# Patient Record
Sex: MALE | Race: WHITE | Employment: OTHER | URBAN - METROPOLITAN AREA
[De-identification: names, ages, dates, MRNs, and addresses within clinical notes are randomized per-mention and may not be internally consistent; named-entity substitution may affect disease eponyms.]

---

## 2017-02-08 RX ORDER — ATENOLOL 50 MG/1
TABLET ORAL
Qty: 90 TABLET | Refills: 0 | Status: SHIPPED | OUTPATIENT
Start: 2017-02-08 | End: 2018-09-04

## 2018-09-04 ENCOUNTER — HOSPITAL ENCOUNTER (OUTPATIENT)
Age: 73
Setting detail: OBSERVATION
Discharge: HOME | End: 2018-09-06
Attending: EMERGENCY MEDICINE | Admitting: INTERNAL MEDICINE
Payer: MEDICARE

## 2018-09-04 ENCOUNTER — APPOINTMENT (OUTPATIENT)
Dept: CT IMAGING | Age: 73
End: 2018-09-04
Payer: MEDICARE

## 2018-09-04 ENCOUNTER — APPOINTMENT (OUTPATIENT)
Dept: GENERAL RADIOLOGY | Age: 73
End: 2018-09-04
Payer: MEDICARE

## 2018-09-04 DIAGNOSIS — I25.10 CORONARY ARTERY DISEASE INVOLVING NATIVE CORONARY ARTERY OF NATIVE HEART, ANGINA PRESENCE UNSPECIFIED: ICD-10-CM

## 2018-09-04 DIAGNOSIS — R07.9 CHEST PAIN ON EXERTION: Primary | ICD-10-CM

## 2018-09-04 DIAGNOSIS — R06.09 DYSPNEA ON EXERTION: ICD-10-CM

## 2018-09-04 LAB
A/G RATIO: 1.6 (ref 1.1–2.2)
ALBUMIN SERPL-MCNC: 4 G/DL (ref 3.4–5)
ALP BLD-CCNC: 57 U/L (ref 40–129)
ALT SERPL-CCNC: 20 U/L (ref 10–40)
ANION GAP SERPL CALCULATED.3IONS-SCNC: 12 MMOL/L (ref 3–16)
AST SERPL-CCNC: 21 U/L (ref 15–37)
BASOPHILS ABSOLUTE: 0.1 K/UL (ref 0–0.2)
BASOPHILS RELATIVE PERCENT: 0.9 %
BILIRUB SERPL-MCNC: 0.5 MG/DL (ref 0–1)
BUN BLDV-MCNC: 18 MG/DL (ref 7–20)
CALCIUM SERPL-MCNC: 9.2 MG/DL (ref 8.3–10.6)
CHLORIDE BLD-SCNC: 105 MMOL/L (ref 99–110)
CO2: 24 MMOL/L (ref 21–32)
CREAT SERPL-MCNC: 1 MG/DL (ref 0.8–1.3)
EOSINOPHILS ABSOLUTE: 0.1 K/UL (ref 0–0.6)
EOSINOPHILS RELATIVE PERCENT: 2.3 %
GFR AFRICAN AMERICAN: >60
GFR NON-AFRICAN AMERICAN: >60
GLOBULIN: 2.5 G/DL
GLUCOSE BLD-MCNC: 109 MG/DL (ref 70–99)
HCT VFR BLD CALC: 38 % (ref 40.5–52.5)
HEMOGLOBIN: 13.4 G/DL (ref 13.5–17.5)
LYMPHOCYTES ABSOLUTE: 0.9 K/UL (ref 1–5.1)
LYMPHOCYTES RELATIVE PERCENT: 14.3 %
MCH RBC QN AUTO: 31.6 PG (ref 26–34)
MCHC RBC AUTO-ENTMCNC: 35.3 G/DL (ref 31–36)
MCV RBC AUTO: 89.7 FL (ref 80–100)
MONOCYTES ABSOLUTE: 0.6 K/UL (ref 0–1.3)
MONOCYTES RELATIVE PERCENT: 9.6 %
NEUTROPHILS ABSOLUTE: 4.6 K/UL (ref 1.7–7.7)
NEUTROPHILS RELATIVE PERCENT: 72.9 %
PDW BLD-RTO: 13.6 % (ref 12.4–15.4)
PLATELET # BLD: 202 K/UL (ref 135–450)
PMV BLD AUTO: 7.4 FL (ref 5–10.5)
POTASSIUM REFLEX MAGNESIUM: 4.6 MMOL/L (ref 3.5–5.1)
PRO-BNP: 142 PG/ML (ref 0–124)
RBC # BLD: 4.24 M/UL (ref 4.2–5.9)
SODIUM BLD-SCNC: 141 MMOL/L (ref 136–145)
TOTAL PROTEIN: 6.5 G/DL (ref 6.4–8.2)
TROPONIN: <0.01 NG/ML
WBC # BLD: 6.3 K/UL (ref 4–11)

## 2018-09-04 PROCEDURE — 6360000004 HC RX CONTRAST MEDICATION: Performed by: NURSE PRACTITIONER

## 2018-09-04 PROCEDURE — G0378 HOSPITAL OBSERVATION PER HR: HCPCS

## 2018-09-04 PROCEDURE — 6370000000 HC RX 637 (ALT 250 FOR IP): Performed by: INTERNAL MEDICINE

## 2018-09-04 PROCEDURE — 6370000000 HC RX 637 (ALT 250 FOR IP): Performed by: NURSE PRACTITIONER

## 2018-09-04 PROCEDURE — 83880 ASSAY OF NATRIURETIC PEPTIDE: CPT

## 2018-09-04 PROCEDURE — 85025 COMPLETE CBC W/AUTO DIFF WBC: CPT

## 2018-09-04 PROCEDURE — 71275 CT ANGIOGRAPHY CHEST: CPT

## 2018-09-04 PROCEDURE — 2580000003 HC RX 258: Performed by: NURSE PRACTITIONER

## 2018-09-04 PROCEDURE — 93010 ELECTROCARDIOGRAM REPORT: CPT | Performed by: INTERNAL MEDICINE

## 2018-09-04 PROCEDURE — 80053 COMPREHEN METABOLIC PANEL: CPT

## 2018-09-04 PROCEDURE — 36415 COLL VENOUS BLD VENIPUNCTURE: CPT

## 2018-09-04 PROCEDURE — 84484 ASSAY OF TROPONIN QUANT: CPT

## 2018-09-04 PROCEDURE — 6370000000 HC RX 637 (ALT 250 FOR IP): Performed by: EMERGENCY MEDICINE

## 2018-09-04 PROCEDURE — 71045 X-RAY EXAM CHEST 1 VIEW: CPT

## 2018-09-04 PROCEDURE — 99285 EMERGENCY DEPT VISIT HI MDM: CPT

## 2018-09-04 PROCEDURE — 93005 ELECTROCARDIOGRAM TRACING: CPT | Performed by: EMERGENCY MEDICINE

## 2018-09-04 RX ORDER — MORPHINE SULFATE 4 MG/ML
4 INJECTION, SOLUTION INTRAMUSCULAR; INTRAVENOUS
Status: DISCONTINUED | OUTPATIENT
Start: 2018-09-04 | End: 2018-09-06 | Stop reason: HOSPADM

## 2018-09-04 RX ORDER — ASPIRIN 81 MG/1
324 TABLET, CHEWABLE ORAL ONCE
Status: COMPLETED | OUTPATIENT
Start: 2018-09-04 | End: 2018-09-04

## 2018-09-04 RX ORDER — NITROGLYCERIN 0.4 MG/1
0.4 TABLET SUBLINGUAL EVERY 5 MIN PRN
Status: DISCONTINUED | OUTPATIENT
Start: 2018-09-04 | End: 2018-09-06 | Stop reason: HOSPADM

## 2018-09-04 RX ORDER — CHLORAL HYDRATE 500 MG
2000 CAPSULE ORAL 2 TIMES DAILY
Status: DISCONTINUED | OUTPATIENT
Start: 2018-09-04 | End: 2018-09-04 | Stop reason: RX

## 2018-09-04 RX ORDER — METHYLPHENIDATE HYDROCHLORIDE 10 MG/1
10 TABLET ORAL 4 TIMES DAILY
Status: DISCONTINUED | OUTPATIENT
Start: 2018-09-04 | End: 2018-09-06 | Stop reason: HOSPADM

## 2018-09-04 RX ORDER — ATENOLOL 50 MG/1
1 TABLET ORAL DAILY
Status: CANCELLED | OUTPATIENT
Start: 2018-09-04

## 2018-09-04 RX ORDER — TRAZODONE HYDROCHLORIDE 50 MG/1
100 TABLET ORAL NIGHTLY
Status: DISCONTINUED | OUTPATIENT
Start: 2018-09-04 | End: 2018-09-06 | Stop reason: HOSPADM

## 2018-09-04 RX ORDER — FLUOXETINE HYDROCHLORIDE 20 MG/1
60 CAPSULE ORAL DAILY
Status: DISCONTINUED | OUTPATIENT
Start: 2018-09-04 | End: 2018-09-06 | Stop reason: HOSPADM

## 2018-09-04 RX ORDER — ASPIRIN 81 MG/1
81 TABLET, CHEWABLE ORAL DAILY
Status: DISCONTINUED | OUTPATIENT
Start: 2018-09-04 | End: 2018-09-06 | Stop reason: HOSPADM

## 2018-09-04 RX ORDER — SODIUM CHLORIDE 0.9 % (FLUSH) 0.9 %
10 SYRINGE (ML) INJECTION EVERY 12 HOURS SCHEDULED
Status: DISCONTINUED | OUTPATIENT
Start: 2018-09-04 | End: 2018-09-06 | Stop reason: HOSPADM

## 2018-09-04 RX ORDER — M-VIT,TX,IRON,MINS/CALC/FOLIC 27MG-0.4MG
1 TABLET ORAL DAILY
Status: DISCONTINUED | OUTPATIENT
Start: 2018-09-04 | End: 2018-09-06 | Stop reason: HOSPADM

## 2018-09-04 RX ORDER — SODIUM CHLORIDE 0.9 % (FLUSH) 0.9 %
10 SYRINGE (ML) INJECTION PRN
Status: DISCONTINUED | OUTPATIENT
Start: 2018-09-04 | End: 2018-09-06 | Stop reason: HOSPADM

## 2018-09-04 RX ORDER — ACETAMINOPHEN 325 MG/1
650 TABLET ORAL EVERY 4 HOURS PRN
Status: DISCONTINUED | OUTPATIENT
Start: 2018-09-04 | End: 2018-09-06 | Stop reason: HOSPADM

## 2018-09-04 RX ORDER — ONDANSETRON 2 MG/ML
4 INJECTION INTRAMUSCULAR; INTRAVENOUS EVERY 6 HOURS PRN
Status: DISCONTINUED | OUTPATIENT
Start: 2018-09-04 | End: 2018-09-06 | Stop reason: HOSPADM

## 2018-09-04 RX ORDER — ASCORBIC ACID 500 MG
500 TABLET ORAL DAILY
Status: DISCONTINUED | OUTPATIENT
Start: 2018-09-04 | End: 2018-09-06 | Stop reason: HOSPADM

## 2018-09-04 RX ORDER — ATORVASTATIN CALCIUM 40 MG/1
40 TABLET, FILM COATED ORAL NIGHTLY
Status: DISCONTINUED | OUTPATIENT
Start: 2018-09-04 | End: 2018-09-06 | Stop reason: HOSPADM

## 2018-09-04 RX ADMIN — ASPIRIN 81 MG 324 MG: 81 TABLET ORAL at 12:09

## 2018-09-04 RX ADMIN — FLUOXETINE 60 MG: 20 CAPSULE ORAL at 21:16

## 2018-09-04 RX ADMIN — ASPIRIN 81 MG 81 MG: 81 TABLET ORAL at 21:16

## 2018-09-04 RX ADMIN — TRAZODONE HYDROCHLORIDE 100 MG: 50 TABLET ORAL at 21:21

## 2018-09-04 RX ADMIN — ATORVASTATIN CALCIUM 40 MG: 40 TABLET, FILM COATED ORAL at 21:21

## 2018-09-04 RX ADMIN — SODIUM CHLORIDE, PRESERVATIVE FREE 10 ML: 5 INJECTION INTRAVENOUS at 21:21

## 2018-09-04 RX ADMIN — IOPAMIDOL 75 ML: 755 INJECTION, SOLUTION INTRAVENOUS at 15:18

## 2018-09-04 ASSESSMENT — PAIN SCALES - GENERAL
PAINLEVEL_OUTOF10: 0

## 2018-09-04 ASSESSMENT — HEART SCORE: ECG: 0

## 2018-09-04 NOTE — H&P
daily.      Historical Provider, MD   Psyllium (METAMUCIL PO) Take  by mouth. Historical Provider, MD   Omega-3 Fatty Acids (FISH OIL) 1000 MG CAPS Take 2,000 mg by mouth 2 times daily. Historical Provider, MD   Misc Natural Products (OSTEO BI-FLEX JOINT SHIELD PO) Take  by mouth. Historical Provider, MD       Allergies:  Pcn [penicillins] and Sulfa antibiotics    Social History:      The patient currently lives at home with his wife. TOBACCO:   reports that he has never smoked. He has never used smokeless tobacco.  ETOH:   reports that he does not drink alcohol. Family History:    Positive as follows:    Family History   Problem Relation Age of Onset    Heart Disease Maternal Grandfather        REVIEW OF SYSTEMS:   Pertinent positives as noted in the HPI. All other systems reviewed and negative. PHYSICAL EXAM PERFORMED:    /77   Pulse 52   Temp 98.5 °F (36.9 °C) (Oral)   Resp 16   Ht 5' 10\" (1.778 m)   Wt 220 lb (99.8 kg)   SpO2 98%   BMI 31.57 kg/m²     General appearance:  No apparent distress, appears stated age and cooperative. HEENT:  Normal cephalic, atraumatic without obvious deformity. Pupils equal, round, and reactive to light. Extra ocular muscles intact. Conjunctivae/corneas clear. Neck: Supple, with full range of motion. No jugular venous distention. Trachea midline. Respiratory:  Normal respiratory effort. Clear to auscultation, bilaterally without Rales/Wheezes/Rhonchi. Cardiovascular:  bradycardiac rate and normal rhythm with normal S1/S2 without murmurs, rubs or gallops. Abdomen: Soft, non-tender, non-distended with normal bowel sounds. Musculoskeletal:  No clubbing, cyanosis or edema bilaterally. Full range of motion without deformity. Skin: Skin color, texture, turgor normal.  No rashes or lesions. Neurologic:  Neurovascularly intact without any focal sensory/motor deficits.  Cranial nerves: II-XII intact, grossly non-focal.  Psychiatric:  Alert

## 2018-09-04 NOTE — ED PROVIDER NOTES
Emergency Physician Note    Chief Complaint  Shortness of Breath (pt is winded with any kind of activity x 2 weeks)       History of Present Illness  Alena Mayorga is a 68 y.o. male who presents to the ED for dyspnea on exertion and chest pain on exertion. Patient has noticed for the past 2 weeks he's had different exertions particularly when going up one flight of stairs. The past several days he's had to stop and sit down at the top of the steps and rest in order to regain his breath. Today he walked up to the flight of stairs got short of breath, sat down and regained his breath and then went back down the steps and at that point he noticed chest pain. Chest pain resolved with rest, currently does not have any chest pain at this time. Patient also reports that daily he will take his dog out to ashlee a ball, typically he is able to do a circular around the park but lately he's been unable to do so due to shortness of breath. Denies fever, chills, malaise,  cough, abdominal pain, nausea, vomiting, diarrhea, headache, sore throat, dysuria, back pain, rash. No palliative/provocative factors. Positives and pertinent negatives as per HPI. All other of the 10 systems were reviewed and are negative. I have reviewed the following from the nursing documentation:      Prior to Admission medications    Medication Sig Start Date End Date Taking? Authorizing Provider   atenolol (TENORMIN) 50 MG tablet TAKE ONE TABLET BY MOUTH DAILY 2/8/17   Evi Almonte MD   FLUoxetine (PROZAC) 20 MG capsule Take 60 mg by mouth daily    Historical Provider, MD   methylphenidate (RITALIN) 5 MG tablet Take 10 mg by mouth 4 times daily. Historical Provider, MD   traZODone (DESYREL) 50 MG tablet Take 100 mg by mouth nightly. Historical Provider, MD   aspirin 81 MG tablet Take 81 mg by mouth daily. Historical Provider, MD   multivitamin [facility]) per tablet Take 1 tablet by mouth daily.       Historical unremarkable. ST Segments: normal  Delta waves, Brugada Syndrome, and Short WA are not present. No significant change from prior EKG dated none available    Cardiac Monitor Strip Interpretation    Interpreted by me  Monitor strip interpreted for greater than 10 seconds    Rhythm: normal sinus   Rate: normal  Ectopy: none  ST Segments: normal    RADIOLOGY  X-RAYS:  I have reviewed radiologic plain film image(s). ALL OTHER NON-PLAIN FILM IMAGES SUCH AS CT, ULTRASOUND AND MRI HAVE BEEN READ BY THE RADIOLOGIST. XR CHEST PORTABLE   Final Result   No acute cardiopulmonary disease.               Chest X-Ray    Interpreted by: Emergency Department Physician    View: Portable chest xray    Findings: Normal heart size, Normal lungs, Normal mediastinum    LABS  Results for orders placed or performed during the hospital encounter of 09/04/18   CBC auto differential   Result Value Ref Range    WBC 6.3 4.0 - 11.0 K/uL    RBC 4.24 4.20 - 5.90 M/uL    Hemoglobin 13.4 (L) 13.5 - 17.5 g/dL    Hematocrit 38.0 (L) 40.5 - 52.5 %    MCV 89.7 80.0 - 100.0 fL    MCH 31.6 26.0 - 34.0 pg    MCHC 35.3 31.0 - 36.0 g/dL    RDW 13.6 12.4 - 15.4 %    Platelets 965 416 - 445 K/uL    MPV 7.4 5.0 - 10.5 fL    Neutrophils % 72.9 %    Lymphocytes % 14.3 %    Monocytes % 9.6 %    Eosinophils % 2.3 %    Basophils % 0.9 %    Neutrophils # 4.6 1.7 - 7.7 K/uL    Lymphocytes # 0.9 (L) 1.0 - 5.1 K/uL    Monocytes # 0.6 0.0 - 1.3 K/uL    Eosinophils # 0.1 0.0 - 0.6 K/uL    Basophils # 0.1 0.0 - 0.2 K/uL   Comprehensive Metabolic Panel w/ Reflex to MG   Result Value Ref Range    Sodium 141 136 - 145 mmol/L    Potassium reflex Magnesium 4.6 3.5 - 5.1 mmol/L    Chloride 105 99 - 110 mmol/L    CO2 24 21 - 32 mmol/L    Anion Gap 12 3 - 16    Glucose 109 (H) 70 - 99 mg/dL    BUN 18 7 - 20 mg/dL    CREATININE 1.0 0.8 - 1.3 mg/dL    GFR Non-African American >60 >60    GFR African American >60 >60    Calcium 9.2 8.3 - 10.6 mg/dL    Total Protein 6.5 6.4 - 8.2 g/dL    Alb 4.0 3.4 - 5.0 g/dL    Albumin/Globulin Ratio 1.6 1.1 - 2.2    Total Bilirubin 0.5 0.0 - 1.0 mg/dL    Alkaline Phosphatase 57 40 - 129 U/L    ALT 20 10 - 40 U/L    AST 21 15 - 37 U/L    Globulin 2.5 g/dL   Troponin   Result Value Ref Range    Troponin <0.01 <0.01 ng/mL   Brain natriuretic peptide   Result Value Ref Range    Pro- (H) 0 - 124 pg/mL   EKG 12 lead   Result Value Ref Range    Ventricular Rate 68 BPM    Atrial Rate 68 BPM    P-R Interval 158 ms    QRS Duration 82 ms    Q-T Interval 394 ms    QTc Calculation (Bazett) 418 ms    P Axis 61 degrees    R Axis 33 degrees    T Axis 48 degrees    Diagnosis       Normal sinus rhythmNormal ECGNo previous ECGs availableConfirmed by LUCI Wiley MD ([id number]) on 9/4/2018 11:51:09 AM       PROCEDURES      MEDICAL DECISION MAKING    Records review:  Karin Barajas MD     4/6/2018 10:42 AM  [address]  [phone]    Cardiac Catheterization Report    Patient Teresa Wu  DLZ:  [id number]  Age:  73 y.o. Date:  3/20/2018    Primary Care Physician:  Ruddy Reed MD Hetty Bees, MD  Referring Abner López MD    Procedures performed:  1. Left Cardiac Catheterization  2. Selective Coronary Angiography  3. Left Ventriculography      Impression[de-identified]   1. Right dominant  2. Mild diffuse ectasia of all three coronary arteries  3. Mild 20% proximal plaquing of RCA  4. 40% ostial stenosis of D2  5. Mild 30% mid to distal LAD  6. No severe CAD  7. Preserved LV systolic function with EF of 65% and no segmental WMA    HEART SCORE:    History: +2 for high suspicion  EKG: +0 for normal EKG   Age: +4 for age >65 years  Risk factors (includes HLD, HTN, DM, tobacco use, obesity, and +FHx): +2 for known CAD or 3+ risk factors  Initial troponin: +0 for negative troponin    Heart score: 6.   This falls under the following category: Score of 4-6, which

## 2018-09-04 NOTE — PROGRESS NOTES
Patient admitted to room 209.2 from ED. Patient oriented to room, call light, bed rails, phone, lights and bathroom. Patient instructed about the schedule of the day including: vital sign frequency, lab draws, possible tests, frequency of MD and staff rounds, including RN/MD rounding together at bedside, daily weights, and I &O's. Patient instructed about prescribed diet, how to use 8MENU, and television. Telemetry box in place, patient aware of placement and reason. Bed locked, in lowest position, side rails up 2/4, call light within reach. Will continue to monitor.

## 2018-09-05 LAB
BASOPHILS ABSOLUTE: 0 K/UL (ref 0–0.2)
BASOPHILS RELATIVE PERCENT: 0.8 %
EOSINOPHILS ABSOLUTE: 0.2 K/UL (ref 0–0.6)
EOSINOPHILS RELATIVE PERCENT: 4.4 %
HCT VFR BLD CALC: 39.5 % (ref 40.5–52.5)
HEMOGLOBIN: 13.6 G/DL (ref 13.5–17.5)
LYMPHOCYTES ABSOLUTE: 1.4 K/UL (ref 1–5.1)
LYMPHOCYTES RELATIVE PERCENT: 25.5 %
MCH RBC QN AUTO: 30.8 PG (ref 26–34)
MCHC RBC AUTO-ENTMCNC: 34.4 G/DL (ref 31–36)
MCV RBC AUTO: 89.7 FL (ref 80–100)
MONOCYTES ABSOLUTE: 0.5 K/UL (ref 0–1.3)
MONOCYTES RELATIVE PERCENT: 9.8 %
NEUTROPHILS ABSOLUTE: 3.2 K/UL (ref 1.7–7.7)
NEUTROPHILS RELATIVE PERCENT: 59.5 %
PDW BLD-RTO: 13.3 % (ref 12.4–15.4)
PLATELET # BLD: 191 K/UL (ref 135–450)
PMV BLD AUTO: 7.4 FL (ref 5–10.5)
RBC # BLD: 4.4 M/UL (ref 4.2–5.9)
WBC # BLD: 5.3 K/UL (ref 4–11)

## 2018-09-05 PROCEDURE — G0378 HOSPITAL OBSERVATION PER HR: HCPCS

## 2018-09-05 PROCEDURE — 6370000000 HC RX 637 (ALT 250 FOR IP): Performed by: NURSE PRACTITIONER

## 2018-09-05 PROCEDURE — 2580000003 HC RX 258: Performed by: NURSE PRACTITIONER

## 2018-09-05 PROCEDURE — 36415 COLL VENOUS BLD VENIPUNCTURE: CPT

## 2018-09-05 PROCEDURE — 6370000000 HC RX 637 (ALT 250 FOR IP): Performed by: INTERNAL MEDICINE

## 2018-09-05 PROCEDURE — 85025 COMPLETE CBC W/AUTO DIFF WBC: CPT

## 2018-09-05 RX ADMIN — Medication 1 TABLET: at 08:49

## 2018-09-05 RX ADMIN — FLUOXETINE 60 MG: 20 CAPSULE ORAL at 08:48

## 2018-09-05 RX ADMIN — SODIUM CHLORIDE, PRESERVATIVE FREE 10 ML: 5 INJECTION INTRAVENOUS at 08:49

## 2018-09-05 RX ADMIN — METHYLPHENIDATE HYDROCHLORIDE 10 MG: 10 TABLET ORAL at 13:14

## 2018-09-05 RX ADMIN — ATORVASTATIN CALCIUM 40 MG: 40 TABLET, FILM COATED ORAL at 20:16

## 2018-09-05 RX ADMIN — METHYLPHENIDATE HYDROCHLORIDE 10 MG: 10 TABLET ORAL at 08:49

## 2018-09-05 RX ADMIN — OXYCODONE HYDROCHLORIDE AND ACETAMINOPHEN 500 MG: 500 TABLET ORAL at 08:48

## 2018-09-05 RX ADMIN — TRAZODONE HYDROCHLORIDE 100 MG: 50 TABLET ORAL at 20:16

## 2018-09-05 RX ADMIN — SODIUM CHLORIDE, PRESERVATIVE FREE 10 ML: 5 INJECTION INTRAVENOUS at 20:16

## 2018-09-05 RX ADMIN — METHYLPHENIDATE HYDROCHLORIDE 10 MG: 10 TABLET ORAL at 16:46

## 2018-09-05 RX ADMIN — ASPIRIN 81 MG 81 MG: 81 TABLET ORAL at 08:48

## 2018-09-05 ASSESSMENT — PAIN SCALES - GENERAL
PAINLEVEL_OUTOF10: 0

## 2018-09-05 NOTE — PLAN OF CARE
Problem: Falls - Risk of:  Goal: Will remain free from falls  Will remain free from falls   Outcome: Ongoing  Pt will remain free from falls throughout hospital stay. Bed in lowest position with wheels locked and side rails 2/4 up. Room door open and hourly rounding completed. Will continue to monitor throughout shift.

## 2018-09-06 VITALS
OXYGEN SATURATION: 97 % | DIASTOLIC BLOOD PRESSURE: 75 MMHG | TEMPERATURE: 98.2 F | BODY MASS INDEX: 28.42 KG/M2 | WEIGHT: 203 LBS | SYSTOLIC BLOOD PRESSURE: 150 MMHG | RESPIRATION RATE: 16 BRPM | HEART RATE: 78 BPM | HEIGHT: 71 IN

## 2018-09-06 LAB
LV EF: 58 %
LVEF MODALITY: NORMAL

## 2018-09-06 PROCEDURE — 93306 TTE W/DOPPLER COMPLETE: CPT | Performed by: INTERNAL MEDICINE

## 2018-09-06 PROCEDURE — 6370000000 HC RX 637 (ALT 250 FOR IP): Performed by: NURSE PRACTITIONER

## 2018-09-06 PROCEDURE — 2580000003 HC RX 258: Performed by: NURSE PRACTITIONER

## 2018-09-06 PROCEDURE — G0378 HOSPITAL OBSERVATION PER HR: HCPCS

## 2018-09-06 RX ADMIN — FLUOXETINE 60 MG: 20 CAPSULE ORAL at 08:52

## 2018-09-06 RX ADMIN — ASPIRIN 81 MG 81 MG: 81 TABLET ORAL at 08:52

## 2018-09-06 RX ADMIN — Medication 1 TABLET: at 10:45

## 2018-09-06 RX ADMIN — METHYLPHENIDATE HYDROCHLORIDE 10 MG: 10 TABLET ORAL at 08:52

## 2018-09-06 RX ADMIN — METHYLPHENIDATE HYDROCHLORIDE 10 MG: 10 TABLET ORAL at 13:10

## 2018-09-06 RX ADMIN — SODIUM CHLORIDE, PRESERVATIVE FREE 10 ML: 5 INJECTION INTRAVENOUS at 08:53

## 2018-09-06 RX ADMIN — OXYCODONE HYDROCHLORIDE AND ACETAMINOPHEN 500 MG: 500 TABLET ORAL at 08:52

## 2018-09-06 ASSESSMENT — PAIN SCALES - GENERAL: PAINLEVEL_OUTOF10: 0

## 2018-09-06 NOTE — CARE COORDINATION
CM: brief chart review for possible DC needs / DC order. No DC needs ID'd or noted.  Thank Brianna Villarreal RN, CM, DCP 9/6/2018

## 2018-09-06 NOTE — PLAN OF CARE
Problem: Falls - Risk of:  Goal: Will remain free from falls  Will remain free from falls   Outcome: Met This Shift  Pt remained free of falls this shift. Continued to walk around unit, but stayed on unit with wife. Will continue to monitor until discharge.

## 2018-09-07 LAB
EKG ATRIAL RATE: 68 BPM
EKG DIAGNOSIS: NORMAL
EKG P AXIS: 61 DEGREES
EKG P-R INTERVAL: 158 MS
EKG Q-T INTERVAL: 394 MS
EKG QRS DURATION: 82 MS
EKG QTC CALCULATION (BAZETT): 418 MS
EKG R AXIS: 33 DEGREES
EKG T AXIS: 48 DEGREES
EKG VENTRICULAR RATE: 68 BPM

## 2018-09-07 NOTE — DISCHARGE SUMMARY
Resp 16   Ht 5' 11\" (1.803 m)   Wt 203 lb (92.1 kg)   SpO2 97%   BMI 28.31 kg/m²       General appearance:  No apparent distress, appears stated age and cooperative. HEENT:  Normal cephalic, atraumatic without obvious deformity. Pupils equal, round, and reactive to light. Extra ocular muscles intact. Conjunctivae/corneas clear. Neck: Supple, with full range of motion. No jugular venous distention. Trachea midline. Respiratory:  Normal respiratory effort. Clear to auscultation, bilaterally without Rales/Wheezes/Rhonchi. Cardiovascular:  Regular rate and rhythm with normal S1/S2 without murmurs, rubs or gallops. Abdomen: Soft, non-tender, non-distended with normal bowel sounds. Musculoskeletal:  No clubbing, cyanosis or edema bilaterally. Full range of motion without deformity. Skin: Skin color, texture, turgor normal.  No rashes or lesions. Neurologic:  Neurovascularly intact without any focal sensory/motor deficits. Cranial nerves: II-XII intact, grossly non-focal.  Psychiatric:  Alert and oriented, thought content appropriate, normal insight  Capillary Refill: Brisk,< 3 seconds   Peripheral Pulses: +2 palpable, equal bilaterally       Labs: For convenience and continuity at follow-up the following most recent labs are provided:      CBC:    Lab Results   Component Value Date    WBC 5.3 09/05/2018    HGB 13.6 09/05/2018    HCT 39.5 09/05/2018     09/05/2018       Renal:    Lab Results   Component Value Date     09/04/2018    K 4.6 09/04/2018     09/04/2018    CO2 24 09/04/2018    BUN 18 09/04/2018    CREATININE 1.0 09/04/2018    CALCIUM 9.2 09/04/2018         Significant Diagnostic Studies    Radiology:   CTA PULMONARY W CONTRAST   Final Result   No evidence of pulmonary embolism. Mild aneurysmal dilation of the ascending aorta measuring up to 4.2 cm. XR CHEST PORTABLE   Final Result   No acute cardiopulmonary disease.                 Consults:     IP CONSULT TO HOSPITALIST    Disposition:  Home     Condition at Discharge: Stable    Discharge Instructions/Follow-up: With PCP in 1 week for hospital follow up    Code Status:  Full Code    Activity: activity as tolerated    Diet: cardiac diet      Discharge Medications:     Discharge Medication List as of 9/6/2018  2:31 PM           Details   FLUoxetine (PROZAC) 20 MG capsule Take 60 mg by mouth dailyHistorical Med      methylphenidate (RITALIN) 5 MG tablet Take 10 mg by mouth 4 times daily. traZODone (DESYREL) 50 MG tablet Take 100 mg by mouth nightly. aspirin 81 MG tablet Take 81 mg by mouth daily. multivitamin (THERAGRAN) per tablet Take 1 tablet by mouth daily. Ascorbic Acid (VITAMIN C) 250 MG tablet Take 500 mg by mouth daily. Psyllium (METAMUCIL PO) Take  by mouth. Omega-3 Fatty Acids (FISH OIL) 1000 MG CAPS Take 2,000 mg by mouth 2 times daily. Misc Natural Products (OSTEO BI-FLEX JOINT SHIELD PO) Take  by mouth. Time Spent on discharge is more than 45 minutes in the examination, evaluation, counseling and review of medications and discharge plan. Signed:    ADRIANA Zimmer - CNP   9/7/2018      Thank you Nazarioareli Mcneill for the opportunity to be involved in this patient's care. If you have any questions or concerns please feel free to contact me at [phone].

## 2018-09-07 NOTE — PROGRESS NOTES
Hospitalist Progress Note      PCP: Alanna Middleton    Date of Admission: 9/4/2018    Chief Complaint:   Chief Complaint   Patient presents with    Shortness of Breath     pt is winded with any kind of activity x 2 weeks         Hospital Course:    68 y.o. male who presented to [facility] at the request of his PCP following evaluation for chest pain this morning. Pt experienced midsternal CP with radiation to his jaw,while walking up the steps this morning. This was associated with diaphoresis, persistent GONZALES. CP resolved around 1030a. Pain worsens with movement, resolves with rest.   He has a hx of exertional angina, s/p cath 3/2018(no intervention)  Denies fever, n.v.d. No recent sick contacts    Subjective: pending Echo       Medications:  Reviewed    Infusion Medications   Scheduled Medications   PRN Meds:       Intake/Output Summary (Last 24 hours) at 09/07/18 1213  Last data filed at 09/06/18 1433   Gross per 24 hour   Intake              240 ml   Output                0 ml   Net              240 ml       Physical Exam Performed:    BP (!) 150/75   Pulse 78   Temp 98.2 °F (36.8 °C) (Oral)   Resp 16   Ht 5' 11\" (1.803 m)   Wt 203 lb (92.1 kg)   SpO2 97%   BMI 28.31 kg/m²     General appearance: No apparent distress, appears stated age and cooperative. HEENT: Pupils equal, round, and reactive to light. Conjunctivae/corneas clear. Neck: Supple, with full range of motion. No jugular venous distention. Trachea midline. Respiratory:  Normal respiratory effort. Clear to auscultation, bilaterally without Rales/Wheezes/Rhonchi. Cardiovascular: Regular rate and rhythm with normal S1/S2 without murmurs, rubs or gallops. Abdomen: Soft, non-tender, non-distended with normal bowel sounds. Musculoskeletal: No clubbing, cyanosis or edema bilaterally. Full range of motion without deformity. Skin: Skin color, texture, turgor normal.  No rashes or lesions.   Neurologic:  Neurovascularly intact without

## 2019-03-07 ENCOUNTER — OFFICE VISIT (OUTPATIENT)
Dept: ORTHOPEDIC SURGERY | Age: 74
End: 2019-03-07
Payer: MEDICARE

## 2019-03-07 VITALS — BODY MASS INDEX: 29.12 KG/M2 | WEIGHT: 208 LBS | HEIGHT: 71 IN

## 2019-03-07 DIAGNOSIS — M25.562 PAIN IN BOTH KNEES, UNSPECIFIED CHRONICITY: ICD-10-CM

## 2019-03-07 DIAGNOSIS — M25.561 PAIN IN BOTH KNEES, UNSPECIFIED CHRONICITY: ICD-10-CM

## 2019-03-07 DIAGNOSIS — M17.0 PRIMARY OSTEOARTHRITIS OF BOTH KNEES: Primary | ICD-10-CM

## 2019-03-07 PROCEDURE — G8427 DOCREV CUR MEDS BY ELIG CLIN: HCPCS | Performed by: ORTHOPAEDIC SURGERY

## 2019-03-07 PROCEDURE — G8484 FLU IMMUNIZE NO ADMIN: HCPCS | Performed by: ORTHOPAEDIC SURGERY

## 2019-03-07 PROCEDURE — 4040F PNEUMOC VAC/ADMIN/RCVD: CPT | Performed by: ORTHOPAEDIC SURGERY

## 2019-03-07 PROCEDURE — 1101F PT FALLS ASSESS-DOCD LE1/YR: CPT | Performed by: ORTHOPAEDIC SURGERY

## 2019-03-07 PROCEDURE — 1036F TOBACCO NON-USER: CPT | Performed by: ORTHOPAEDIC SURGERY

## 2019-03-07 PROCEDURE — G8419 CALC BMI OUT NRM PARAM NOF/U: HCPCS | Performed by: ORTHOPAEDIC SURGERY

## 2019-03-07 PROCEDURE — 1123F ACP DISCUSS/DSCN MKR DOCD: CPT | Performed by: ORTHOPAEDIC SURGERY

## 2019-03-07 PROCEDURE — 99203 OFFICE O/P NEW LOW 30 MIN: CPT | Performed by: ORTHOPAEDIC SURGERY

## 2019-03-07 PROCEDURE — 3017F COLORECTAL CA SCREEN DOC REV: CPT | Performed by: ORTHOPAEDIC SURGERY

## 2019-08-08 ENCOUNTER — APPOINTMENT (OUTPATIENT)
Dept: GENERAL RADIOLOGY | Age: 74
End: 2019-08-08
Payer: MEDICARE

## 2019-08-08 ENCOUNTER — HOSPITAL ENCOUNTER (EMERGENCY)
Age: 74
Discharge: HOME | End: 2019-08-08
Attending: EMERGENCY MEDICINE
Payer: MEDICARE

## 2019-08-08 VITALS
RESPIRATION RATE: 18 BRPM | HEART RATE: 59 BPM | SYSTOLIC BLOOD PRESSURE: 123 MMHG | WEIGHT: 207 LBS | BODY MASS INDEX: 28.98 KG/M2 | DIASTOLIC BLOOD PRESSURE: 67 MMHG | TEMPERATURE: 97.6 F | OXYGEN SATURATION: 99 % | HEIGHT: 71 IN

## 2019-08-08 DIAGNOSIS — R42 DIZZINESS: ICD-10-CM

## 2019-08-08 DIAGNOSIS — R07.9 CHEST PAIN, UNSPECIFIED TYPE: Primary | ICD-10-CM

## 2019-08-08 LAB
ANION GAP SERPL CALCULATED.3IONS-SCNC: 10 MMOL/L (ref 3–16)
BASOPHILS ABSOLUTE: 0.1 K/UL (ref 0–0.2)
BASOPHILS RELATIVE PERCENT: 0.9 %
BUN BLDV-MCNC: 16 MG/DL (ref 7–20)
CALCIUM SERPL-MCNC: 9.3 MG/DL (ref 8.3–10.6)
CHLORIDE BLD-SCNC: 103 MMOL/L (ref 99–110)
CO2: 25 MMOL/L (ref 21–32)
CREAT SERPL-MCNC: 1.2 MG/DL (ref 0.8–1.3)
EKG ATRIAL RATE: 57 BPM
EKG DIAGNOSIS: NORMAL
EKG P AXIS: 42 DEGREES
EKG P-R INTERVAL: 190 MS
EKG Q-T INTERVAL: 436 MS
EKG QRS DURATION: 82 MS
EKG QTC CALCULATION (BAZETT): 424 MS
EKG R AXIS: 22 DEGREES
EKG T AXIS: 50 DEGREES
EKG VENTRICULAR RATE: 57 BPM
EOSINOPHILS ABSOLUTE: 0.3 K/UL (ref 0–0.6)
EOSINOPHILS RELATIVE PERCENT: 4.7 %
GFR AFRICAN AMERICAN: >60
GFR NON-AFRICAN AMERICAN: 59
GLUCOSE BLD-MCNC: 120 MG/DL (ref 70–99)
HCT VFR BLD CALC: 40.1 % (ref 40.5–52.5)
HEMOGLOBIN: 14 G/DL (ref 13.5–17.5)
LYMPHOCYTES ABSOLUTE: 1.1 K/UL (ref 1–5.1)
LYMPHOCYTES RELATIVE PERCENT: 17.7 %
MCH RBC QN AUTO: 31 PG (ref 26–34)
MCHC RBC AUTO-ENTMCNC: 35 G/DL (ref 31–36)
MCV RBC AUTO: 88.7 FL (ref 80–100)
MONOCYTES ABSOLUTE: 0.6 K/UL (ref 0–1.3)
MONOCYTES RELATIVE PERCENT: 10.5 %
NEUTROPHILS ABSOLUTE: 4 K/UL (ref 1.7–7.7)
NEUTROPHILS RELATIVE PERCENT: 66.2 %
PDW BLD-RTO: 13.7 % (ref 12.4–15.4)
PLATELET # BLD: 205 K/UL (ref 135–450)
PMV BLD AUTO: 8 FL (ref 5–10.5)
POTASSIUM SERPL-SCNC: 4.8 MMOL/L (ref 3.5–5.1)
PRO-BNP: 206 PG/ML (ref 0–449)
RBC # BLD: 4.52 M/UL (ref 4.2–5.9)
SODIUM BLD-SCNC: 138 MMOL/L (ref 136–145)
TROPONIN: <0.01 NG/ML
TROPONIN: <0.01 NG/ML
WBC # BLD: 6 K/UL (ref 4–11)

## 2019-08-08 PROCEDURE — 85025 COMPLETE CBC W/AUTO DIFF WBC: CPT

## 2019-08-08 PROCEDURE — 93005 ELECTROCARDIOGRAM TRACING: CPT | Performed by: EMERGENCY MEDICINE

## 2019-08-08 PROCEDURE — 97161 PT EVAL LOW COMPLEX 20 MIN: CPT

## 2019-08-08 PROCEDURE — 93010 ELECTROCARDIOGRAM REPORT: CPT | Performed by: INTERNAL MEDICINE

## 2019-08-08 PROCEDURE — 2580000003 HC RX 258: Performed by: EMERGENCY MEDICINE

## 2019-08-08 PROCEDURE — 71046 X-RAY EXAM CHEST 2 VIEWS: CPT

## 2019-08-08 PROCEDURE — 84484 ASSAY OF TROPONIN QUANT: CPT

## 2019-08-08 PROCEDURE — 97116 GAIT TRAINING THERAPY: CPT

## 2019-08-08 PROCEDURE — 83880 ASSAY OF NATRIURETIC PEPTIDE: CPT

## 2019-08-08 PROCEDURE — 99285 EMERGENCY DEPT VISIT HI MDM: CPT

## 2019-08-08 PROCEDURE — 96360 HYDRATION IV INFUSION INIT: CPT

## 2019-08-08 PROCEDURE — 80048 BASIC METABOLIC PNL TOTAL CA: CPT

## 2019-08-08 RX ORDER — 0.9 % SODIUM CHLORIDE 0.9 %
1000 INTRAVENOUS SOLUTION INTRAVENOUS ONCE
Status: COMPLETED | OUTPATIENT
Start: 2019-08-08 | End: 2019-08-08

## 2019-08-08 RX ORDER — RIVASTIGMINE TARTRATE 6 MG/1
1 CAPSULE ORAL 2 TIMES DAILY
COMMUNITY
Start: 2019-04-27

## 2019-08-08 RX ORDER — ATORVASTATIN CALCIUM 40 MG/1
TABLET, FILM COATED ORAL DAILY
COMMUNITY
Start: 2019-06-01

## 2019-08-08 RX ADMIN — SODIUM CHLORIDE 1000 ML: 9 INJECTION, SOLUTION INTRAVENOUS at 11:26

## 2019-08-08 ASSESSMENT — ENCOUNTER SYMPTOMS
SHORTNESS OF BREATH: 0
BACK PAIN: 0
NAUSEA: 0
CHEST TIGHTNESS: 0
DIARRHEA: 0
CONSTIPATION: 0
ABDOMINAL PAIN: 0
COUGH: 0
TROUBLE SWALLOWING: 0
VOMITING: 0
SORE THROAT: 0
RHINORRHEA: 0

## 2019-08-08 NOTE — ED NOTES
FUNCTIONAL MOBILITY PHYSICAL THERAPY EVALUATION  EMERGENCY DEPARTMENT     Received referral for Physical Therapy Evaluation in the Emergency Department. Pt educated to role of PT in the ED, and pt agreeable to proceed with evaluation. Evaluation Date: 8/8/2019   Patient Name: Brenda Schultz   YOB: 1945    Medical Diagnosis:  Chest pain, dizziness  Treatment Diagnosis:  Decreased mobility  Onset Date:   8/8/29   Referral Date: 8/8/2019  Referring Provider: Cynthia Wade MD  Restrictions/Precautions: Alzheimer's     Discharge Recommendations from PHYSICAL perspective:   Home Independently     Home Health S4 Level: NA        AM-PAC Mobility Score              DME needs for discharge:     Needs Met      SUBJECTIVE FINDINGS    History of current Episode: Pt presents to ED with c/o chest pain, weakness, dizziness. Pt notes that he woke up this way this morning. When he came downstairs, he thought he was having a heart attack. He was feeling lightheaded and weak. Pt notes that he has had chest pains before but nothing like today. none      Cognition    A&O to x4  Able to follow:  1 step commands    Pain   No  Rating:NA  Location: NA  Pain Medicine Status: Denies need    Preadmission Environment    Pt. Lives    with family (souse and son)     Home environment:   two story home  Steps to enter first floor:    2 steps to enter   Steps to second floor:  Full flight of 12-13  Bathroom:    Walk-in Shower, Grab bars and [address] [place] [address] owned:   none    Preadmission Status / PLOF:  History of falls     No  Pt. Able to drive     Yes  Pt independent with ADLs  Yes   Pt. Required assistance from family for: Independent PTA    Pt.  Fully independent for transfers and gait and walked with:    No Device    OBJECTIVE FINDINGS    Upper Extremity ROM/Strength  BUE ROM WFL     Lower Extremity ROM / Strength (Ratings on a 5/5 scale)    AROM: WFL    Right LE  quads    [facility]

## 2019-08-08 NOTE — ED PROVIDER NOTES
dizziness, weakness and numbness. Except as noted above the remainder of the review of systems was reviewedand negative. PAST MEDICAL HISTORY     Past Medical History:   Diagnosis Date    ADHD (attention deficit hyperactivity disorder)     Alzheimer disease     Cancer ([address]     prostate    Hyperlipidemia     Hypertension     IFG (impaired fasting glucose)     OCD (obsessive compulsive disorder)     Renal insufficiency     TIA (transient ischemic attack)          SURGICAL HISTORY       Past Surgical History:   Procedure Laterality Date    COLONOSCOPY  2004    EYE SURGERY      PROSTATECTOMY           CURRENT MEDICATIONS       Discharge Medication List as of 8/8/2019  1:58 PM      CONTINUE these medications which have NOT CHANGED    Details   rivastigmine (EXELON) 6 MG capsule Take 1 capsule by mouthHistorical Med      FLUoxetine (PROZAC) 20 MG capsule Take 60 mg by mouth dailyHistorical Med      methylphenidate (RITALIN) 5 MG tablet Take 10 mg by mouth 4 times daily. traZODone (DESYREL) 50 MG tablet Take 100 mg by mouth nightly. aspirin 81 MG tablet Take 81 mg by mouth daily. multivitamin (THERAGRAN) per tablet Take 1 tablet by mouth daily. Ascorbic Acid (VITAMIN C) 250 MG tablet Take 500 mg by mouth daily. Psyllium (METAMUCIL PO) Take  by mouth. Omega-3 Fatty Acids (FISH OIL) 1000 MG CAPS Take 2,000 mg by mouth 2 times daily. Misc Natural Products (OSTEO BI-FLEX JOINT SHIELD PO) Take  by mouth.         atorvastatin (LIPITOR) 40 MG tablet Historical Med             ALLERGIES     Pcn [penicillins] and Sulfa antibiotics    FAMILY HISTORY       Family History   Problem Relation Age of Onset    Heart Disease Maternal Grandfather           SOCIAL HISTORY       Social History     Socioeconomic History    Marital status:      Spouse name: None    Number of children: None    Years of education: None    Highest education level: None   Occupational All other components within normal limits    Narrative:     Performed at:  [address], [address] [place]   Phone [phone]   TROPONIN    Narrative:     Performed at:  [facility]) [address], [address] [place] [place]   Phone [phone]   TROPONIN    Narrative:     Performed at:  [facility]) [address], [address] [place] [place]   Phone [phone] PEPTIDE    Narrative:     Performed at:  [facility]) [address], [facility] [place]   Phone [phone]       All other labs were within normal range ornot returned as of this dictation. EMERGENCY DEPARTMENT COURSE and DIFFERENTIAL DIAGNOSIS/MDM:   Vitals:    Vitals:    08/08/19 1008 08/08/19 1121 08/08/19 1254   BP: 137/79 (!) 176/77 123/67   Pulse: 55 59 59   Resp: 20 18 18   Temp: 97.6 °F (36.4 °C)     TempSrc: Oral     SpO2: 94% 100% 99%   Weight: 207 lb (93.9 kg)     Height: 5' 11\" (1.803 m)           [facility]    ED COURSE/MDM    -Lacey Sommers is a 76 y.o. male with a history of TIA, HLD, HTN  -States he took aspirin today  -Patient seen and evaluated. Oldrecords reviewed. Labs and imaging reviewed and results discussed with patient. Workup was significant for elevated glucose of 120  -Patient was given IVF bolus in the ED with good symptomatic relief. Patient was reassessed as noted above. -Chest x-ray shows no acute cardiopulmonary process  -PT evaluation, per physical therapist patient walked well without any complaints of dizziness or lightheadedness with steady gait  -On reassessment patient reports feeling at baseline and is ready to be discharged. -repeat troponin negative  -Noacute pathology was noted and plan for discharge home with close follow up with PCP was discussed with patient and family. Strict ED return precautions given for new/worsending symptoms.

## 2019-09-17 ENCOUNTER — NURSE ONLY (OUTPATIENT)
Dept: ORTHOPEDIC SURGERY | Age: 74
End: 2019-09-17
Payer: MEDICARE

## 2019-09-17 DIAGNOSIS — M17.0 PRIMARY OSTEOARTHRITIS OF BOTH KNEES: Primary | ICD-10-CM

## 2019-09-17 NOTE — PROGRESS NOTES
Diagnosis: Right and left knee osteoarthritis    Procedure: Right and left knee cortisone injection    I discussed in detail the risks, benefits and complications of aninjection which included but are not limited to infection, skin reactions, hot swollen joint, and anaphylaxis with the patient. The patient verbalized understanding and gave informed consent for the right and left knee injection. The patient is placed supine on table with a bolster underneath knee. Knee prepped with Betadine/Sterile alcohol solution. A sterile 22-gauge needle was inserted into the knee and the mixture of 2ml Beta-Beta, 3 mL of 0.5% bupivacaine was injected under sterile technique. The needle was withdrawn and the puncture site sealed with a Band-Aid. Technique: Under sterile conditions a [employer] ultrasound unit with a variable frequency (6.0-15.0 MHz) linear transducer was used to localize the placement of a 22-gauge needle into the knee joint. Findings: Successful needle placement for knee injection. Final images were taken and saved for permanent record. The patient tolerated the injection well. The patient was instructed to call the office immediately if there is any pain, redness, warmth, fever, or chills.

## 2019-09-17 NOTE — PROGRESS NOTES
Betamethasone 30mg/5mL 4 cc Lot # [id number]  Exp 6/2020 NDC# [address]-8  Bupivacaine 250mg/50mL  6 cc  Lot # -[id number]  Exp  06/01/2021  NDC# 2668-1569-52    SITE:   RT AND LT KNEES

## 2019-10-18 ENCOUNTER — ANESTHESIA EVENT (OUTPATIENT)
Dept: OPERATING ROOM | Age: 74
End: 2019-10-18
Payer: MEDICARE

## 2019-10-21 ENCOUNTER — HOSPITAL ENCOUNTER (OUTPATIENT)
Age: 74
Setting detail: OUTPATIENT SURGERY
Discharge: HOME | End: 2019-10-21
Attending: OPHTHALMOLOGY | Admitting: OPHTHALMOLOGY
Payer: MEDICARE

## 2019-10-21 ENCOUNTER — ANESTHESIA (OUTPATIENT)
Dept: OPERATING ROOM | Age: 74
End: 2019-10-21
Payer: MEDICARE

## 2019-10-21 VITALS
OXYGEN SATURATION: 98 % | DIASTOLIC BLOOD PRESSURE: 66 MMHG | HEART RATE: 59 BPM | SYSTOLIC BLOOD PRESSURE: 128 MMHG | RESPIRATION RATE: 16 BRPM | TEMPERATURE: 97 F | BODY MASS INDEX: 28.7 KG/M2 | HEIGHT: 71 IN | WEIGHT: 205 LBS

## 2019-10-21 VITALS — DIASTOLIC BLOOD PRESSURE: 77 MMHG | OXYGEN SATURATION: 100 % | SYSTOLIC BLOOD PRESSURE: 140 MMHG

## 2019-10-21 PROCEDURE — 6360000002 HC RX W HCPCS: Performed by: NURSE ANESTHETIST, CERTIFIED REGISTERED

## 2019-10-21 PROCEDURE — 2580000003 HC RX 258: Performed by: NURSE ANESTHETIST, CERTIFIED REGISTERED

## 2019-10-21 PROCEDURE — 7100000010 HC PHASE II RECOVERY - FIRST 15 MIN: Performed by: OPHTHALMOLOGY

## 2019-10-21 PROCEDURE — 3700000000 HC ANESTHESIA ATTENDED CARE: Performed by: OPHTHALMOLOGY

## 2019-10-21 PROCEDURE — 2580000003 HC RX 258: Performed by: OPHTHALMOLOGY

## 2019-10-21 PROCEDURE — V2632 POST CHMBR INTRAOCULAR LENS: HCPCS | Performed by: OPHTHALMOLOGY

## 2019-10-21 PROCEDURE — 2500000003 HC RX 250 WO HCPCS: Performed by: OPHTHALMOLOGY

## 2019-10-21 PROCEDURE — 7100000011 HC PHASE II RECOVERY - ADDTL 15 MIN: Performed by: OPHTHALMOLOGY

## 2019-10-21 PROCEDURE — 6370000000 HC RX 637 (ALT 250 FOR IP): Performed by: OPHTHALMOLOGY

## 2019-10-21 PROCEDURE — 2709999900 HC NON-CHARGEABLE SUPPLY: Performed by: OPHTHALMOLOGY

## 2019-10-21 PROCEDURE — 6360000002 HC RX W HCPCS: Performed by: OPHTHALMOLOGY

## 2019-10-21 PROCEDURE — 2580000003 HC RX 258: Performed by: ANESTHESIOLOGY

## 2019-10-21 PROCEDURE — 2720000010 HC SURG SUPPLY STERILE: Performed by: OPHTHALMOLOGY

## 2019-10-21 PROCEDURE — 3600000003 HC SURGERY LEVEL 3 BASE: Performed by: OPHTHALMOLOGY

## 2019-10-21 DEVICE — ACRYSOF(R) IQ ASPHERIC IOL SP ACRYLIC FOLDABLELENS WULTRASERT(TM) DELIVERY SYSTEM UV WBLUE LIGHT FILTER. 13.0MM LENGTH 6.0MM ANTERIORASYMMETRIC BICONVEX OPTIC PLANAR HAPTICS.
Type: IMPLANTABLE DEVICE | Site: EYE | Status: FUNCTIONAL
Brand: ACRYSOF ULTRASERT

## 2019-10-21 RX ORDER — MAGNESIUM HYDROXIDE 1200 MG/15ML
LIQUID ORAL PRN
Status: DISCONTINUED | OUTPATIENT
Start: 2019-10-21 | End: 2019-10-21 | Stop reason: ALTCHOICE

## 2019-10-21 RX ORDER — ONDANSETRON 2 MG/ML
4 INJECTION INTRAMUSCULAR; INTRAVENOUS
Status: DISCONTINUED | OUTPATIENT
Start: 2019-10-21 | End: 2019-10-21 | Stop reason: HOSPADM

## 2019-10-21 RX ORDER — MORPHINE SULFATE 4 MG/ML
3 INJECTION, SOLUTION INTRAMUSCULAR; INTRAVENOUS
Status: DISCONTINUED | OUTPATIENT
Start: 2019-10-21 | End: 2019-10-21 | Stop reason: HOSPADM

## 2019-10-21 RX ORDER — SODIUM CHLORIDE, SODIUM LACTATE, POTASSIUM CHLORIDE, CALCIUM CHLORIDE 600; 310; 30; 20 MG/100ML; MG/100ML; MG/100ML; MG/100ML
INJECTION, SOLUTION INTRAVENOUS CONTINUOUS
Status: DISCONTINUED | OUTPATIENT
Start: 2019-10-21 | End: 2019-10-21 | Stop reason: HOSPADM

## 2019-10-21 RX ORDER — HYDRALAZINE HYDROCHLORIDE 20 MG/ML
10 INJECTION INTRAMUSCULAR; INTRAVENOUS EVERY 10 MIN PRN
Status: DISCONTINUED | OUTPATIENT
Start: 2019-10-21 | End: 2019-10-21 | Stop reason: HOSPADM

## 2019-10-21 RX ORDER — SODIUM CHLORIDE 0.9 % (FLUSH) 0.9 %
10 SYRINGE (ML) INJECTION EVERY 12 HOURS SCHEDULED
Status: CANCELLED | OUTPATIENT
Start: 2019-10-21

## 2019-10-21 RX ORDER — SODIUM CHLORIDE, SODIUM LACTATE, POTASSIUM CHLORIDE, CALCIUM CHLORIDE 600; 310; 30; 20 MG/100ML; MG/100ML; MG/100ML; MG/100ML
INJECTION, SOLUTION INTRAVENOUS CONTINUOUS PRN
Status: DISCONTINUED | OUTPATIENT
Start: 2019-10-21 | End: 2019-10-21 | Stop reason: SDUPTHER

## 2019-10-21 RX ORDER — MIDAZOLAM HYDROCHLORIDE 1 MG/ML
INJECTION INTRAMUSCULAR; INTRAVENOUS PRN
Status: DISCONTINUED | OUTPATIENT
Start: 2019-10-21 | End: 2019-10-21 | Stop reason: SDUPTHER

## 2019-10-21 RX ORDER — FENTANYL CITRATE 50 UG/ML
INJECTION, SOLUTION INTRAMUSCULAR; INTRAVENOUS PRN
Status: DISCONTINUED | OUTPATIENT
Start: 2019-10-21 | End: 2019-10-21 | Stop reason: SDUPTHER

## 2019-10-21 RX ORDER — SODIUM CHLORIDE 0.9 % (FLUSH) 0.9 %
10 SYRINGE (ML) INJECTION PRN
Status: CANCELLED | OUTPATIENT
Start: 2019-10-21

## 2019-10-21 RX ORDER — OXYCODONE HYDROCHLORIDE 5 MG/1
5 TABLET ORAL
Status: DISCONTINUED | OUTPATIENT
Start: 2019-10-21 | End: 2019-10-21 | Stop reason: HOSPADM

## 2019-10-21 RX ORDER — SODIUM CHLORIDE, SODIUM LACTATE, POTASSIUM CHLORIDE, CALCIUM CHLORIDE 600; 310; 30; 20 MG/100ML; MG/100ML; MG/100ML; MG/100ML
INJECTION, SOLUTION INTRAVENOUS CONTINUOUS
Status: CANCELLED | OUTPATIENT
Start: 2019-10-21

## 2019-10-21 RX ORDER — MEPERIDINE HYDROCHLORIDE 50 MG/ML
12.5 INJECTION INTRAMUSCULAR; INTRAVENOUS; SUBCUTANEOUS EVERY 5 MIN PRN
Status: DISCONTINUED | OUTPATIENT
Start: 2019-10-21 | End: 2019-10-21 | Stop reason: HOSPADM

## 2019-10-21 RX ADMIN — FENTANYL CITRATE 50 MCG: 50 INJECTION INTRAMUSCULAR; INTRAVENOUS at 14:26

## 2019-10-21 RX ADMIN — MIDAZOLAM HYDROCHLORIDE 1 MG: 2 INJECTION, SOLUTION INTRAMUSCULAR; INTRAVENOUS at 14:26

## 2019-10-21 RX ADMIN — SODIUM CHLORIDE, POTASSIUM CHLORIDE, SODIUM LACTATE AND CALCIUM CHLORIDE: 600; 310; 30; 20 INJECTION, SOLUTION INTRAVENOUS at 13:37

## 2019-10-21 RX ADMIN — Medication 0.3 ML: at 13:19

## 2019-10-21 RX ADMIN — Medication 0.3 ML: at 13:39

## 2019-10-21 RX ADMIN — SODIUM CHLORIDE, POTASSIUM CHLORIDE, SODIUM LACTATE AND CALCIUM CHLORIDE: 600; 310; 30; 20 INJECTION, SOLUTION INTRAVENOUS at 14:24

## 2019-10-21 RX ADMIN — Medication 0.3 ML: at 13:29

## 2019-10-21 ASSESSMENT — PULMONARY FUNCTION TESTS
PIF_VALUE: 0
PIF_VALUE: 1
PIF_VALUE: 0

## 2019-10-21 ASSESSMENT — PAIN SCALES - GENERAL: PAINLEVEL_OUTOF10: 0

## 2019-10-21 ASSESSMENT — PAIN - FUNCTIONAL ASSESSMENT: PAIN_FUNCTIONAL_ASSESSMENT: 0-10

## 2019-10-31 ENCOUNTER — ANESTHESIA (OUTPATIENT)
Dept: OPERATING ROOM | Age: 74
End: 2019-10-31
Payer: MEDICARE

## 2019-10-31 ENCOUNTER — ANESTHESIA EVENT (OUTPATIENT)
Dept: OPERATING ROOM | Age: 74
End: 2019-10-31
Payer: MEDICARE

## 2019-10-31 ENCOUNTER — HOSPITAL ENCOUNTER (OUTPATIENT)
Age: 74
Setting detail: OUTPATIENT SURGERY
Discharge: HOME | End: 2019-10-31
Attending: OPHTHALMOLOGY | Admitting: OPHTHALMOLOGY
Payer: MEDICARE

## 2019-10-31 VITALS
DIASTOLIC BLOOD PRESSURE: 66 MMHG | BODY MASS INDEX: 28.7 KG/M2 | SYSTOLIC BLOOD PRESSURE: 127 MMHG | RESPIRATION RATE: 20 BRPM | HEIGHT: 71 IN | OXYGEN SATURATION: 95 % | HEART RATE: 56 BPM | WEIGHT: 205 LBS | TEMPERATURE: 97.4 F

## 2019-10-31 VITALS — SYSTOLIC BLOOD PRESSURE: 126 MMHG | DIASTOLIC BLOOD PRESSURE: 72 MMHG | OXYGEN SATURATION: 100 %

## 2019-10-31 PROCEDURE — 6370000000 HC RX 637 (ALT 250 FOR IP): Performed by: OPHTHALMOLOGY

## 2019-10-31 PROCEDURE — 2580000003 HC RX 258: Performed by: OPHTHALMOLOGY

## 2019-10-31 PROCEDURE — 2580000003 HC RX 258: Performed by: ANESTHESIOLOGY

## 2019-10-31 PROCEDURE — 7100000010 HC PHASE II RECOVERY - FIRST 15 MIN: Performed by: OPHTHALMOLOGY

## 2019-10-31 PROCEDURE — V2632 POST CHMBR INTRAOCULAR LENS: HCPCS | Performed by: OPHTHALMOLOGY

## 2019-10-31 PROCEDURE — 2500000003 HC RX 250 WO HCPCS: Performed by: OPHTHALMOLOGY

## 2019-10-31 PROCEDURE — 6360000002 HC RX W HCPCS: Performed by: OPHTHALMOLOGY

## 2019-10-31 PROCEDURE — 2709999900 HC NON-CHARGEABLE SUPPLY: Performed by: OPHTHALMOLOGY

## 2019-10-31 PROCEDURE — 3600000003 HC SURGERY LEVEL 3 BASE: Performed by: OPHTHALMOLOGY

## 2019-10-31 PROCEDURE — 7100000011 HC PHASE II RECOVERY - ADDTL 15 MIN: Performed by: OPHTHALMOLOGY

## 2019-10-31 PROCEDURE — 3700000001 HC ADD 15 MINUTES (ANESTHESIA): Performed by: OPHTHALMOLOGY

## 2019-10-31 PROCEDURE — 3700000000 HC ANESTHESIA ATTENDED CARE: Performed by: OPHTHALMOLOGY

## 2019-10-31 PROCEDURE — 3600000013 HC SURGERY LEVEL 3 ADDTL 15MIN: Performed by: OPHTHALMOLOGY

## 2019-10-31 PROCEDURE — 6360000002 HC RX W HCPCS: Performed by: NURSE ANESTHETIST, CERTIFIED REGISTERED

## 2019-10-31 DEVICE — ACRYSOF(R) IQ ASPHERIC IOL SP ACRYLIC FOLDABLELENS WULTRASERT(TM) DELIVERY SYSTEM UV WBLUE LIGHT FILTER. 13.0MM LENGTH 6.0MM ANTERIORASYMMETRIC BICONVEX OPTIC PLANAR HAPTICS.
Type: IMPLANTABLE DEVICE | Site: EYE | Status: FUNCTIONAL
Brand: ACRYSOF ULTRASERT

## 2019-10-31 RX ORDER — LIDOCAINE HYDROCHLORIDE 10 MG/ML
2 INJECTION, SOLUTION INFILTRATION; PERINEURAL
Status: DISCONTINUED | OUTPATIENT
Start: 2019-10-31 | End: 2019-10-31 | Stop reason: HOSPADM

## 2019-10-31 RX ORDER — MIDAZOLAM HYDROCHLORIDE 1 MG/ML
INJECTION INTRAMUSCULAR; INTRAVENOUS PRN
Status: DISCONTINUED | OUTPATIENT
Start: 2019-10-31 | End: 2019-10-31 | Stop reason: SDUPTHER

## 2019-10-31 RX ORDER — SODIUM CHLORIDE, SODIUM LACTATE, POTASSIUM CHLORIDE, CALCIUM CHLORIDE 600; 310; 30; 20 MG/100ML; MG/100ML; MG/100ML; MG/100ML
INJECTION, SOLUTION INTRAVENOUS CONTINUOUS
Status: DISCONTINUED | OUTPATIENT
Start: 2019-10-31 | End: 2019-10-31 | Stop reason: HOSPADM

## 2019-10-31 RX ORDER — MAGNESIUM HYDROXIDE 1200 MG/15ML
LIQUID ORAL PRN
Status: DISCONTINUED | OUTPATIENT
Start: 2019-10-31 | End: 2019-10-31 | Stop reason: ALTCHOICE

## 2019-10-31 RX ORDER — FENTANYL CITRATE 50 UG/ML
INJECTION, SOLUTION INTRAMUSCULAR; INTRAVENOUS PRN
Status: DISCONTINUED | OUTPATIENT
Start: 2019-10-31 | End: 2019-10-31 | Stop reason: SDUPTHER

## 2019-10-31 RX ADMIN — MIDAZOLAM HYDROCHLORIDE 1 MG: 2 INJECTION, SOLUTION INTRAMUSCULAR; INTRAVENOUS at 08:52

## 2019-10-31 RX ADMIN — FENTANYL CITRATE 25 MCG: 50 INJECTION INTRAMUSCULAR; INTRAVENOUS at 08:48

## 2019-10-31 RX ADMIN — MIDAZOLAM HYDROCHLORIDE 1 MG: 2 INJECTION, SOLUTION INTRAMUSCULAR; INTRAVENOUS at 08:48

## 2019-10-31 RX ADMIN — Medication 0.3 ML: at 08:00

## 2019-10-31 RX ADMIN — SODIUM CHLORIDE, POTASSIUM CHLORIDE, SODIUM LACTATE AND CALCIUM CHLORIDE: 600; 310; 30; 20 INJECTION, SOLUTION INTRAVENOUS at 07:52

## 2019-10-31 RX ADMIN — Medication 0.3 ML: at 07:40

## 2019-10-31 RX ADMIN — Medication 0.3 ML: at 07:50

## 2019-10-31 ASSESSMENT — PULMONARY FUNCTION TESTS
PIF_VALUE: 0

## 2019-10-31 ASSESSMENT — PAIN - FUNCTIONAL ASSESSMENT: PAIN_FUNCTIONAL_ASSESSMENT: 0-10

## 2019-10-31 ASSESSMENT — PAIN SCALES - GENERAL: PAINLEVEL_OUTOF10: 0

## 2019-12-09 ENCOUNTER — OFFICE VISIT (OUTPATIENT)
Dept: ORTHOPEDIC SURGERY | Age: 74
End: 2019-12-09
Payer: MEDICARE

## 2019-12-09 VITALS
HEART RATE: 60 BPM | WEIGHT: 205.03 LBS | DIASTOLIC BLOOD PRESSURE: 80 MMHG | HEIGHT: 71 IN | BODY MASS INDEX: 28.7 KG/M2 | SYSTOLIC BLOOD PRESSURE: 128 MMHG

## 2019-12-09 DIAGNOSIS — M25.562 LEFT KNEE PAIN, UNSPECIFIED CHRONICITY: ICD-10-CM

## 2019-12-09 DIAGNOSIS — M25.561 RIGHT KNEE PAIN, UNSPECIFIED CHRONICITY: ICD-10-CM

## 2019-12-09 DIAGNOSIS — M17.0 BILATERAL PRIMARY OSTEOARTHRITIS OF KNEE: Primary | ICD-10-CM

## 2019-12-09 PROCEDURE — 4040F PNEUMOC VAC/ADMIN/RCVD: CPT | Performed by: ORTHOPAEDIC SURGERY

## 2019-12-09 PROCEDURE — 99213 OFFICE O/P EST LOW 20 MIN: CPT | Performed by: ORTHOPAEDIC SURGERY

## 2019-12-09 PROCEDURE — G8484 FLU IMMUNIZE NO ADMIN: HCPCS | Performed by: ORTHOPAEDIC SURGERY

## 2019-12-09 PROCEDURE — G8417 CALC BMI ABV UP PARAM F/U: HCPCS | Performed by: ORTHOPAEDIC SURGERY

## 2019-12-09 PROCEDURE — 3017F COLORECTAL CA SCREEN DOC REV: CPT | Performed by: ORTHOPAEDIC SURGERY

## 2019-12-09 PROCEDURE — 1123F ACP DISCUSS/DSCN MKR DOCD: CPT | Performed by: ORTHOPAEDIC SURGERY

## 2019-12-09 PROCEDURE — 1036F TOBACCO NON-USER: CPT | Performed by: ORTHOPAEDIC SURGERY

## 2019-12-09 PROCEDURE — G8427 DOCREV CUR MEDS BY ELIG CLIN: HCPCS | Performed by: ORTHOPAEDIC SURGERY

## 2019-12-09 RX ORDER — BETAMETHASONE SODIUM PHOSPHATE AND BETAMETHASONE ACETATE 3; 3 MG/ML; MG/ML
24 INJECTION, SUSPENSION INTRA-ARTICULAR; INTRALESIONAL; INTRAMUSCULAR; SOFT TISSUE ONCE
Status: COMPLETED | OUTPATIENT
Start: 2019-12-09 | End: 2019-12-09

## 2019-12-09 RX ORDER — METHYLPREDNISOLONE ACETATE 40 MG/ML
80 INJECTION, SUSPENSION INTRA-ARTICULAR; INTRALESIONAL; INTRAMUSCULAR; SOFT TISSUE ONCE
Status: COMPLETED | OUTPATIENT
Start: 2019-12-09 | End: 2019-12-09

## 2019-12-09 RX ADMIN — BETAMETHASONE SODIUM PHOSPHATE AND BETAMETHASONE ACETATE 24 MG: 3; 3 INJECTION, SUSPENSION INTRA-ARTICULAR; INTRALESIONAL; INTRAMUSCULAR; SOFT TISSUE at 16:18

## 2019-12-09 RX ADMIN — METHYLPREDNISOLONE ACETATE 80 MG: 40 INJECTION, SUSPENSION INTRA-ARTICULAR; INTRALESIONAL; INTRAMUSCULAR; SOFT TISSUE at 16:18

## 2020-03-11 ENCOUNTER — NURSE ONLY (OUTPATIENT)
Dept: ORTHOPEDIC SURGERY | Age: 75
End: 2020-03-11
Payer: MEDICARE

## 2020-03-11 RX ORDER — BUPIVACAINE HYDROCHLORIDE 5 MG/ML
30 INJECTION, SOLUTION PERINEURAL ONCE
Status: COMPLETED | OUTPATIENT
Start: 2020-03-11 | End: 2020-03-11

## 2020-03-11 RX ORDER — BETAMETHASONE SODIUM PHOSPHATE AND BETAMETHASONE ACETATE 3; 3 MG/ML; MG/ML
24 INJECTION, SUSPENSION INTRA-ARTICULAR; INTRALESIONAL; INTRAMUSCULAR; SOFT TISSUE ONCE
Status: COMPLETED | OUTPATIENT
Start: 2020-03-11 | End: 2020-03-11

## 2020-03-11 RX ADMIN — BUPIVACAINE HYDROCHLORIDE 150 MG: 5 INJECTION, SOLUTION PERINEURAL at 09:14

## 2020-03-11 RX ADMIN — BETAMETHASONE SODIUM PHOSPHATE AND BETAMETHASONE ACETATE 24 MG: 3; 3 INJECTION, SUSPENSION INTRA-ARTICULAR; INTRALESIONAL; INTRAMUSCULAR; SOFT TISSUE at 09:14

## 2020-06-05 ENCOUNTER — NURSE ONLY (OUTPATIENT)
Dept: ORTHOPEDIC SURGERY | Age: 75
End: 2020-06-05
Payer: MEDICARE

## 2020-06-05 RX ORDER — BUPIVACAINE HYDROCHLORIDE 5 MG/ML
60 INJECTION, SOLUTION PERINEURAL ONCE
Status: COMPLETED | OUTPATIENT
Start: 2020-06-05 | End: 2020-06-05

## 2020-06-05 RX ORDER — BETAMETHASONE SODIUM PHOSPHATE AND BETAMETHASONE ACETATE 3; 3 MG/ML; MG/ML
24 INJECTION, SUSPENSION INTRA-ARTICULAR; INTRALESIONAL; INTRAMUSCULAR; SOFT TISSUE ONCE
Status: COMPLETED | OUTPATIENT
Start: 2020-06-05 | End: 2020-06-05

## 2020-06-05 RX ADMIN — BETAMETHASONE SODIUM PHOSPHATE AND BETAMETHASONE ACETATE 24 MG: 3; 3 INJECTION, SUSPENSION INTRA-ARTICULAR; INTRALESIONAL; INTRAMUSCULAR; SOFT TISSUE at 10:16

## 2020-06-05 RX ADMIN — BUPIVACAINE HYDROCHLORIDE 300 MG: 5 INJECTION, SOLUTION PERINEURAL at 10:15

## 2020-09-30 ENCOUNTER — OFFICE VISIT (OUTPATIENT)
Dept: ORTHOPEDIC SURGERY | Age: 75
End: 2020-09-30
Payer: MEDICARE

## 2020-09-30 RX ORDER — BUPIVACAINE HYDROCHLORIDE 5 MG/ML
30 INJECTION, SOLUTION PERINEURAL ONCE
Status: COMPLETED | OUTPATIENT
Start: 2020-09-30 | End: 2020-09-30

## 2020-09-30 RX ORDER — BETAMETHASONE SODIUM PHOSPHATE AND BETAMETHASONE ACETATE 3; 3 MG/ML; MG/ML
24 INJECTION, SUSPENSION INTRA-ARTICULAR; INTRALESIONAL; INTRAMUSCULAR; SOFT TISSUE ONCE
Status: COMPLETED | OUTPATIENT
Start: 2020-09-30 | End: 2020-09-30

## 2020-09-30 RX ADMIN — BETAMETHASONE SODIUM PHOSPHATE AND BETAMETHASONE ACETATE 24 MG: 3; 3 INJECTION, SUSPENSION INTRA-ARTICULAR; INTRALESIONAL; INTRAMUSCULAR; SOFT TISSUE at 08:54

## 2020-09-30 RX ADMIN — BUPIVACAINE HYDROCHLORIDE 150 MG: 5 INJECTION, SOLUTION PERINEURAL at 08:53

## 2020-09-30 NOTE — PROGRESS NOTES
Diagnosis: Left and right knee osteoarthritis. Procedure: Left and right knee cortisone injection     I discussed in detail the risks, benefits and complications of aninjection which included but are not limited to infection, skin reactions, hot swollen joint, and anaphylaxis with the patient. The patient verbalized understanding and gave informed consent for the left and right knee injection. The patient's knee was slightly flexed with a bolster underneath the knee and the skin prepped using Betadine or sterile alcohol solution. A sterile 22-gauge needle was inserted into the knee and the mixture of 2ml Beta-Beta, 3 mL of 0.5% bupivacaine was injected under sterile technique. The needle was withdrawn and the puncture site sealed with a Band-Aid. Technique: Under sterile conditions a [employer] ultrasound unit with a variable frequency (6.0-15.0 MHz) linear transducer was used to localize the placement of a 22-gauge needle into the knee joint. Findings: Successful needle placement for knee injection. Final images were taken and saved for permanent record. The patient tolerated the injection well. The patient was instructed to call the office immediately if there is any pain, redness, warmth, fever, or chills.

## 2021-01-13 ENCOUNTER — NURSE ONLY (OUTPATIENT)
Dept: ORTHOPEDIC SURGERY | Age: 76
End: 2021-01-13
Payer: MEDICARE

## 2021-01-13 DIAGNOSIS — M17.0 PRIMARY OSTEOARTHRITIS OF BOTH KNEES: Primary | ICD-10-CM

## 2021-01-13 RX ORDER — BUPIVACAINE HYDROCHLORIDE 5 MG/ML
30 INJECTION, SOLUTION PERINEURAL ONCE
Status: COMPLETED | OUTPATIENT
Start: 2021-01-13 | End: 2021-01-13

## 2021-01-13 RX ORDER — BETAMETHASONE SODIUM PHOSPHATE AND BETAMETHASONE ACETATE 3; 3 MG/ML; MG/ML
24 INJECTION, SUSPENSION INTRA-ARTICULAR; INTRALESIONAL; INTRAMUSCULAR; SOFT TISSUE ONCE
Status: COMPLETED | OUTPATIENT
Start: 2021-01-13 | End: 2021-01-13

## 2021-01-13 RX ADMIN — BUPIVACAINE HYDROCHLORIDE 150 MG: 5 INJECTION, SOLUTION PERINEURAL at 09:37

## 2021-01-13 RX ADMIN — BETAMETHASONE SODIUM PHOSPHATE AND BETAMETHASONE ACETATE 24 MG: 3; 3 INJECTION, SUSPENSION INTRA-ARTICULAR; INTRALESIONAL; INTRAMUSCULAR; SOFT TISSUE at 09:37

## 2021-01-13 NOTE — PROGRESS NOTES
Diagnosis: Left and right knee osteoarthritis    Procedure: Left and right knee cortisone injection    I discussed in detail the risks, benefits and complications of aninjection which included but are not limited to infection, skin reactions, hot swollen joint, and anaphylaxis with the patient. The patient verbalized understanding and gave informed consent for the left and right knee injection. The patient's knee was slightly flexed with a bolster underneath the knee and the skin prepped using Betadine or sterile alcohol solution. A sterile 22-gauge needle was inserted into the knee and the mixture of 2ml Beta-Beta, 3 mL of 0.5% bupivacaine was injected under sterile technique. The needle was withdrawn and the puncture site sealed with a Band-Aid. Technique: Under sterile conditions a [employer] ultrasound unit with a variable frequency (6.0-15.0 MHz) linear transducer was used to localize the placement of a 22-gauge needle into the knee joint. Findings: Successful needle placement for knee injection. Final images were taken and saved for permanent record. The patient tolerated the injection well. The patient was instructed to call the office immediately if there is any pain, redness, warmth, fever, or chills.

## 2022-01-06 ENCOUNTER — HOSPITAL ENCOUNTER (INPATIENT)
Age: 77
LOS: 5 days | Discharge: HOME | DRG: 308 | End: 2022-01-11
Attending: EMERGENCY MEDICINE | Admitting: INTERNAL MEDICINE
Payer: MEDICARE

## 2022-01-06 ENCOUNTER — APPOINTMENT (OUTPATIENT)
Dept: GENERAL RADIOLOGY | Age: 77
DRG: 308 | End: 2022-01-06
Payer: MEDICARE

## 2022-01-06 DIAGNOSIS — I48.91 ATRIAL FIBRILLATION WITH RVR (HCC): ICD-10-CM

## 2022-01-06 DIAGNOSIS — N39.0 URINARY TRACT INFECTION WITHOUT HEMATURIA, SITE UNSPECIFIED: Primary | ICD-10-CM

## 2022-01-06 PROBLEM — N30.00 ACUTE CYSTITIS: Status: ACTIVE | Noted: 2022-01-06

## 2022-01-06 LAB
A/G RATIO: 1.3 (ref 1.1–2.2)
ALBUMIN SERPL-MCNC: 4.1 G/DL (ref 3.4–5)
ALP BLD-CCNC: 98 U/L (ref 40–129)
ALT SERPL-CCNC: 8 U/L (ref 10–40)
ANION GAP SERPL CALCULATED.3IONS-SCNC: 12 MMOL/L (ref 3–16)
AST SERPL-CCNC: 13 U/L (ref 15–37)
BACTERIA: ABNORMAL /HPF
BASOPHILS ABSOLUTE: 0 K/UL (ref 0–0.2)
BASOPHILS RELATIVE PERCENT: 0.7 %
BILIRUB SERPL-MCNC: 0.9 MG/DL (ref 0–1)
BILIRUBIN URINE: ABNORMAL
BLOOD, URINE: ABNORMAL
BUN BLDV-MCNC: 17 MG/DL (ref 7–20)
CALCIUM SERPL-MCNC: 9.2 MG/DL (ref 8.3–10.6)
CHLORIDE BLD-SCNC: 99 MMOL/L (ref 99–110)
CLARITY: ABNORMAL
CO2: 24 MMOL/L (ref 21–32)
COLOR: ABNORMAL
CREAT SERPL-MCNC: 1.4 MG/DL (ref 0.8–1.3)
EOSINOPHILS ABSOLUTE: 0 K/UL (ref 0–0.6)
EOSINOPHILS RELATIVE PERCENT: 0.2 %
GFR AFRICAN AMERICAN: 59
GFR NON-AFRICAN AMERICAN: 49
GLUCOSE BLD-MCNC: 87 MG/DL (ref 70–99)
GLUCOSE URINE: NEGATIVE MG/DL
HCT VFR BLD CALC: 39.7 % (ref 40.5–52.5)
HEMOGLOBIN: 13.4 G/DL (ref 13.5–17.5)
KETONES, URINE: NEGATIVE MG/DL
LACTIC ACID: 1.1 MMOL/L (ref 0.4–2)
LEUKOCYTE ESTERASE, URINE: ABNORMAL
LYMPHOCYTES ABSOLUTE: 0.5 K/UL (ref 1–5.1)
LYMPHOCYTES RELATIVE PERCENT: 10.8 %
MCH RBC QN AUTO: 29.3 PG (ref 26–34)
MCHC RBC AUTO-ENTMCNC: 33.8 G/DL (ref 31–36)
MCV RBC AUTO: 86.5 FL (ref 80–100)
MICROSCOPIC EXAMINATION: YES
MONOCYTES ABSOLUTE: 0.7 K/UL (ref 0–1.3)
MONOCYTES RELATIVE PERCENT: 15.4 %
NEUTROPHILS ABSOLUTE: 3.4 K/UL (ref 1.7–7.7)
NEUTROPHILS RELATIVE PERCENT: 72.9 %
NITRITE, URINE: POSITIVE
PDW BLD-RTO: 14 % (ref 12.4–15.4)
PH UA: 5 (ref 5–8)
PLATELET # BLD: 194 K/UL (ref 135–450)
PMV BLD AUTO: 6.9 FL (ref 5–10.5)
POTASSIUM SERPL-SCNC: 4.2 MMOL/L (ref 3.5–5.1)
PROTEIN UA: 30 MG/DL
RBC # BLD: 4.59 M/UL (ref 4.2–5.9)
RBC UA: ABNORMAL /HPF (ref 0–4)
SODIUM BLD-SCNC: 135 MMOL/L (ref 136–145)
SPECIFIC GRAVITY UA: >=1.03 (ref 1–1.03)
TOTAL PROTEIN: 7.2 G/DL (ref 6.4–8.2)
TROPONIN: <0.01 NG/ML
URINE TYPE: ABNORMAL
UROBILINOGEN, URINE: 0.2 E.U./DL
WBC # BLD: 4.7 K/UL (ref 4–11)
WBC UA: >100 /HPF (ref 0–5)

## 2022-01-06 PROCEDURE — 85025 COMPLETE CBC W/AUTO DIFF WBC: CPT

## 2022-01-06 PROCEDURE — 2500000003 HC RX 250 WO HCPCS: Performed by: NURSE PRACTITIONER

## 2022-01-06 PROCEDURE — 83605 ASSAY OF LACTIC ACID: CPT

## 2022-01-06 PROCEDURE — 2580000003 HC RX 258: Performed by: NURSE PRACTITIONER

## 2022-01-06 PROCEDURE — 87086 URINE CULTURE/COLONY COUNT: CPT

## 2022-01-06 PROCEDURE — 87077 CULTURE AEROBIC IDENTIFY: CPT

## 2022-01-06 PROCEDURE — 1200000000 HC SEMI PRIVATE

## 2022-01-06 PROCEDURE — 87186 SC STD MICRODIL/AGAR DIL: CPT

## 2022-01-06 PROCEDURE — 81001 URINALYSIS AUTO W/SCOPE: CPT

## 2022-01-06 PROCEDURE — 71045 X-RAY EXAM CHEST 1 VIEW: CPT

## 2022-01-06 PROCEDURE — 96375 TX/PRO/DX INJ NEW DRUG ADDON: CPT

## 2022-01-06 PROCEDURE — 84484 ASSAY OF TROPONIN QUANT: CPT

## 2022-01-06 PROCEDURE — 80053 COMPREHEN METABOLIC PANEL: CPT

## 2022-01-06 PROCEDURE — 96367 TX/PROPH/DG ADDL SEQ IV INF: CPT

## 2022-01-06 PROCEDURE — 93005 ELECTROCARDIOGRAM TRACING: CPT | Performed by: NURSE PRACTITIONER

## 2022-01-06 PROCEDURE — 96365 THER/PROPH/DIAG IV INF INIT: CPT

## 2022-01-06 PROCEDURE — 6360000002 HC RX W HCPCS: Performed by: NURSE PRACTITIONER

## 2022-01-06 PROCEDURE — 99284 EMERGENCY DEPT VISIT MOD MDM: CPT

## 2022-01-06 RX ORDER — TRAZODONE HYDROCHLORIDE 50 MG/1
100 TABLET ORAL NIGHTLY
Status: DISCONTINUED | OUTPATIENT
Start: 2022-01-07 | End: 2022-01-10

## 2022-01-06 RX ORDER — METHYLPHENIDATE HYDROCHLORIDE 10 MG/1
10 TABLET ORAL 4 TIMES DAILY
Status: DISCONTINUED | OUTPATIENT
Start: 2022-01-07 | End: 2022-01-10

## 2022-01-06 RX ORDER — POLYETHYLENE GLYCOL 3350 17 G/17G
17 POWDER, FOR SOLUTION ORAL DAILY PRN
Status: DISCONTINUED | OUTPATIENT
Start: 2022-01-06 | End: 2022-01-11 | Stop reason: HOSPADM

## 2022-01-06 RX ORDER — ATENOLOL 25 MG/1
50 TABLET ORAL DAILY
Status: DISCONTINUED | OUTPATIENT
Start: 2022-01-07 | End: 2022-01-11 | Stop reason: HOSPADM

## 2022-01-06 RX ORDER — SODIUM CHLORIDE 0.9 % (FLUSH) 0.9 %
5-40 SYRINGE (ML) INJECTION PRN
Status: DISCONTINUED | OUTPATIENT
Start: 2022-01-06 | End: 2022-01-11 | Stop reason: HOSPADM

## 2022-01-06 RX ORDER — CEFUROXIME AXETIL 250 MG/1
250 TABLET ORAL 2 TIMES DAILY
Qty: 14 TABLET | Refills: 0 | Status: SHIPPED | OUTPATIENT
Start: 2022-01-06 | End: 2022-01-13

## 2022-01-06 RX ORDER — DILTIAZEM HYDROCHLORIDE 5 MG/ML
10 INJECTION INTRAVENOUS ONCE
Status: COMPLETED | OUTPATIENT
Start: 2022-01-06 | End: 2022-01-06

## 2022-01-06 RX ORDER — ACETAMINOPHEN 325 MG/1
650 TABLET ORAL EVERY 6 HOURS PRN
Status: DISCONTINUED | OUTPATIENT
Start: 2022-01-06 | End: 2022-01-11 | Stop reason: HOSPADM

## 2022-01-06 RX ORDER — SODIUM CHLORIDE 9 MG/ML
25 INJECTION, SOLUTION INTRAVENOUS PRN
Status: DISCONTINUED | OUTPATIENT
Start: 2022-01-06 | End: 2022-01-11 | Stop reason: HOSPADM

## 2022-01-06 RX ORDER — ACETAMINOPHEN 650 MG/1
650 SUPPOSITORY RECTAL EVERY 6 HOURS PRN
Status: DISCONTINUED | OUTPATIENT
Start: 2022-01-06 | End: 2022-01-11 | Stop reason: HOSPADM

## 2022-01-06 RX ORDER — SODIUM CHLORIDE 0.9 % (FLUSH) 0.9 %
5-40 SYRINGE (ML) INJECTION EVERY 12 HOURS SCHEDULED
Status: DISCONTINUED | OUTPATIENT
Start: 2022-01-07 | End: 2022-01-11 | Stop reason: HOSPADM

## 2022-01-06 RX ORDER — ONDANSETRON 2 MG/ML
4 INJECTION INTRAMUSCULAR; INTRAVENOUS EVERY 6 HOURS PRN
Status: DISCONTINUED | OUTPATIENT
Start: 2022-01-06 | End: 2022-01-11 | Stop reason: HOSPADM

## 2022-01-06 RX ORDER — ASPIRIN 81 MG/1
81 TABLET ORAL DAILY
Status: DISCONTINUED | OUTPATIENT
Start: 2022-01-07 | End: 2022-01-11 | Stop reason: HOSPADM

## 2022-01-06 RX ORDER — FLUOXETINE HYDROCHLORIDE 20 MG/1
60 CAPSULE ORAL DAILY
Status: DISCONTINUED | OUTPATIENT
Start: 2022-01-07 | End: 2022-01-11 | Stop reason: HOSPADM

## 2022-01-06 RX ORDER — ONDANSETRON 4 MG/1
4 TABLET, ORALLY DISINTEGRATING ORAL EVERY 8 HOURS PRN
Status: DISCONTINUED | OUTPATIENT
Start: 2022-01-06 | End: 2022-01-11 | Stop reason: HOSPADM

## 2022-01-06 RX ORDER — SODIUM CHLORIDE 9 MG/ML
INJECTION, SOLUTION INTRAVENOUS CONTINUOUS
Status: DISCONTINUED | OUTPATIENT
Start: 2022-01-07 | End: 2022-01-11 | Stop reason: HOSPADM

## 2022-01-06 RX ORDER — ATORVASTATIN CALCIUM 40 MG/1
40 TABLET, FILM COATED ORAL NIGHTLY
Status: DISCONTINUED | OUTPATIENT
Start: 2022-01-07 | End: 2022-01-11 | Stop reason: HOSPADM

## 2022-01-06 RX ADMIN — DILTIAZEM HYDROCHLORIDE 5 MG/HR: 5 INJECTION INTRAVENOUS at 21:54

## 2022-01-06 RX ADMIN — CEFTRIAXONE SODIUM 1000 MG: 1 INJECTION, POWDER, FOR SOLUTION INTRAMUSCULAR; INTRAVENOUS at 21:11

## 2022-01-06 RX ADMIN — DILTIAZEM HYDROCHLORIDE 10 MG: 5 INJECTION INTRAVENOUS at 21:52

## 2022-01-06 ASSESSMENT — PAIN SCALES - GENERAL: PAINLEVEL_OUTOF10: 0

## 2022-01-06 NOTE — ED NOTES
Bed: 30  Expected date:   Expected time:   Means of arrival:   Comments:  Triage only     Xiomara Dillon RN  01/06/22 3762

## 2022-01-06 NOTE — ED TRIAGE NOTES
Pt arrives to ED via [id number] [facility] [address] in regards to weakness and lethargy. Family reports patient has been increasingly weak and states pt took a 6 hour nap today. Pt has no complaints at this time. Pt has Alzheimers. Skin pink, w/d. Resps even and unlabored on room air.

## 2022-01-07 LAB
ANION GAP SERPL CALCULATED.3IONS-SCNC: 15 MMOL/L (ref 3–16)
BUN BLDV-MCNC: 18 MG/DL (ref 7–20)
CALCIUM SERPL-MCNC: 8.7 MG/DL (ref 8.3–10.6)
CHLORIDE BLD-SCNC: 100 MMOL/L (ref 99–110)
CO2: 20 MMOL/L (ref 21–32)
CREAT SERPL-MCNC: 1.4 MG/DL (ref 0.8–1.3)
EKG ATRIAL RATE: 166 BPM
EKG ATRIAL RATE: 80 BPM
EKG ATRIAL RATE: 89 BPM
EKG DIAGNOSIS: NORMAL
EKG P AXIS: 41 DEGREES
EKG P AXIS: 54 DEGREES
EKG P-R INTERVAL: 140 MS
EKG P-R INTERVAL: 152 MS
EKG Q-T INTERVAL: 324 MS
EKG Q-T INTERVAL: 352 MS
EKG Q-T INTERVAL: 408 MS
EKG QRS DURATION: 74 MS
EKG QRS DURATION: 76 MS
EKG QRS DURATION: 78 MS
EKG QTC CALCULATION (BAZETT): 428 MS
EKG QTC CALCULATION (BAZETT): 465 MS
EKG QTC CALCULATION (BAZETT): 470 MS
EKG R AXIS: 14 DEGREES
EKG R AXIS: 15 DEGREES
EKG R AXIS: 23 DEGREES
EKG T AXIS: -14 DEGREES
EKG T AXIS: 28 DEGREES
EKG T AXIS: 31 DEGREES
EKG VENTRICULAR RATE: 124 BPM
EKG VENTRICULAR RATE: 80 BPM
EKG VENTRICULAR RATE: 89 BPM
GFR AFRICAN AMERICAN: 59
GFR NON-AFRICAN AMERICAN: 49
GLUCOSE BLD-MCNC: 78 MG/DL (ref 70–99)
INR BLD: 1.15 (ref 0.88–1.12)
LV EF: 58 %
LVEF MODALITY: NORMAL
POTASSIUM SERPL-SCNC: 3.8 MMOL/L (ref 3.5–5.1)
PROTHROMBIN TIME: 13.1 SEC (ref 9.9–12.7)
SARS-COV-2, NAAT: DETECTED
SODIUM BLD-SCNC: 135 MMOL/L (ref 136–145)
TSH REFLEX: 0.51 UIU/ML (ref 0.27–4.2)

## 2022-01-07 PROCEDURE — 80048 BASIC METABOLIC PNL TOTAL CA: CPT

## 2022-01-07 PROCEDURE — 6370000000 HC RX 637 (ALT 250 FOR IP): Performed by: NURSE PRACTITIONER

## 2022-01-07 PROCEDURE — 87635 SARS-COV-2 COVID-19 AMP PRB: CPT

## 2022-01-07 PROCEDURE — C8929 TTE W OR WO FOL WCON,DOPPLER: HCPCS

## 2022-01-07 PROCEDURE — 93010 ELECTROCARDIOGRAM REPORT: CPT | Performed by: INTERNAL MEDICINE

## 2022-01-07 PROCEDURE — 36415 COLL VENOUS BLD VENIPUNCTURE: CPT

## 2022-01-07 PROCEDURE — 93005 ELECTROCARDIOGRAM TRACING: CPT | Performed by: NURSE PRACTITIONER

## 2022-01-07 PROCEDURE — 2580000003 HC RX 258: Performed by: NURSE PRACTITIONER

## 2022-01-07 PROCEDURE — 84443 ASSAY THYROID STIM HORMONE: CPT

## 2022-01-07 PROCEDURE — 6360000002 HC RX W HCPCS: Performed by: NURSE PRACTITIONER

## 2022-01-07 PROCEDURE — 1200000000 HC SEMI PRIVATE

## 2022-01-07 PROCEDURE — 85610 PROTHROMBIN TIME: CPT

## 2022-01-07 RX ORDER — QUETIAPINE FUMARATE 25 MG/1
50 TABLET, FILM COATED ORAL ONCE
Status: COMPLETED | OUTPATIENT
Start: 2022-01-07 | End: 2022-01-07

## 2022-01-07 RX ADMIN — METHYLPHENIDATE HYDROCHLORIDE 10 MG: 10 TABLET ORAL at 20:19

## 2022-01-07 RX ADMIN — FLUOXETINE 60 MG: 20 CAPSULE ORAL at 08:51

## 2022-01-07 RX ADMIN — ENOXAPARIN SODIUM 90 MG: 100 INJECTION SUBCUTANEOUS at 12:46

## 2022-01-07 RX ADMIN — ENOXAPARIN SODIUM 90 MG: 100 INJECTION SUBCUTANEOUS at 00:17

## 2022-01-07 RX ADMIN — METHYLPHENIDATE HYDROCHLORIDE 10 MG: 10 TABLET ORAL at 12:46

## 2022-01-07 RX ADMIN — SODIUM CHLORIDE: 9 INJECTION, SOLUTION INTRAVENOUS at 00:05

## 2022-01-07 RX ADMIN — CEFTRIAXONE SODIUM 1000 MG: 1 INJECTION, POWDER, FOR SOLUTION INTRAMUSCULAR; INTRAVENOUS at 20:18

## 2022-01-07 RX ADMIN — ATORVASTATIN CALCIUM 40 MG: 40 TABLET, FILM COATED ORAL at 20:19

## 2022-01-07 RX ADMIN — METHYLPHENIDATE HYDROCHLORIDE 10 MG: 10 TABLET ORAL at 08:51

## 2022-01-07 RX ADMIN — Medication 10 ML: at 20:20

## 2022-01-07 RX ADMIN — ASPIRIN 81 MG: 81 TABLET, COATED ORAL at 08:51

## 2022-01-07 RX ADMIN — TRAZODONE HYDROCHLORIDE 100 MG: 50 TABLET ORAL at 00:16

## 2022-01-07 RX ADMIN — QUETIAPINE FUMARATE 50 MG: 25 TABLET ORAL at 00:40

## 2022-01-07 RX ADMIN — METHYLPHENIDATE HYDROCHLORIDE 10 MG: 10 TABLET ORAL at 15:50

## 2022-01-07 RX ADMIN — ATORVASTATIN CALCIUM 40 MG: 40 TABLET, FILM COATED ORAL at 00:16

## 2022-01-07 RX ADMIN — ATENOLOL 50 MG: 25 TABLET ORAL at 08:51

## 2022-01-07 RX ADMIN — TRAZODONE HYDROCHLORIDE 100 MG: 50 TABLET ORAL at 20:19

## 2022-01-07 ASSESSMENT — PAIN SCALES - GENERAL
PAINLEVEL_OUTOF10: 0

## 2022-01-07 NOTE — H&P
Hospital Medicine History & Physical      PCP: Zafar Riley    Date of Admission: 1/6/2022    Date of Service: Pt seen/examined on 1/6/2022 and Admitted to Inpatient with expected LOS greater than two midnights due to medical therapy. Chief Complaint:  Fatigue     History Of Present Illness:     68 y.o. male, with PMH of HTN, HLD and dementia, who presented to [facility] with fatigue. The patient is a poor historian d/t dementia. History obtained from the patient and review of EMR. The patient was brought into the emergency room this evening due to being more fatigued over the last few days and taking 6-hour naps which is unusual for him. Throughout the patient's work-up in the emergency room he was found to have a UTI. He was going to be prescribed antibiotics and sent home, however upon discharge the patient's heart rate was noticed to be elevated at 140. At that time a twelve-lead EKG was obtained and the patient was found to be in atrial fibrillation. This is a new onset atrial fibrillation, therefore the patient will be admitted for further evaluation and treatment. He was given a 10 mg IV bolus of diltiazem followed by a diltiazem drip. The patient denied any other associated symptoms as well as any aggravating and/or alleviating factors. At the time of this assessment, the patient was resting comfortably in bed. He currently denies any chest pain, back pain, abdominal pain, shortness of breath, numbness, tingling, N/V/C/D, fever and/or chills.      Past Medical History:          Diagnosis Date    ADHD (attention deficit hyperactivity disorder)     Alzheimer disease ([facility] [address]     Cancer ([facility]     prostate    Hyperlipidemia     Hypertension     IFG (impaired fasting glucose)     OCD (obsessive compulsive disorder)     Osteoarthritis     Renal insufficiency     TIA (transient ischemic attack)      Past Surgical History:          Procedure Laterality Date    COLONOSCOPY  2004    EYE SURGERY      INTRACAPSULAR CATARACT EXTRACTION Right 10/21/2019    PHACOEMULSIFICATION OF CATARACT RIGHT EYE WITH INTRAOCULAR LENS IMPLANT performed by Shanda Khan MD at [facility] Left 10/31/2019    PHACOEMULSIFICATION OF CATARACT LEFT EYE WITH INTRAOCULAR LENS IMPLANT performed by Shanda Khan MD at [address] [place] [address]       Medications Prior to Admission:      Prior to Admission medications    Medication Sig Start Date End Date Taking? Authorizing Provider   cefUROXime (CEFTIN) 250 MG tablet Take 1 tablet by mouth 2 times daily for 7 days 1/6/22 1/13/22 Yes Cyndy Suggs, APRN - CNP   ATENOLOL PO Take 50 mg by mouth daily    Historical Provider, MD   rivastigmine (EXELON) 6 MG capsule Take 1 capsule by mouth 2 times daily  4/27/19   Historical Provider, MD   atorvastatin (LIPITOR) 40 MG tablet daily  6/1/19   Historical Provider, MD   FLUoxetine (PROZAC) 20 MG capsule Take 60 mg by mouth daily    Historical Provider, MD   methylphenidate (RITALIN) 5 MG tablet Take 10 mg by mouth 4 times daily. Historical Provider, MD   traZODone (DESYREL) 50 MG tablet Take 100 mg by mouth nightly. Historical Provider, MD   aspirin 81 MG tablet Take 81 mg by mouth daily. Historical Provider, MD   multivitamin [facility]) per tablet Take 1 tablet by mouth daily. Historical Provider, MD   Ascorbic Acid (VITAMIN C) 250 MG tablet Take 500 mg by mouth daily. Historical Provider, MD   Psyllium (METAMUCIL PO) Take  by mouth. Historical Provider, MD   Omega-3 Fatty Acids (FISH OIL) 1000 MG CAPS Take 2,000 mg by mouth 2 times daily. Historical Provider, MD   Misc Natural Products (OSTEO BI-FLEX JOINT SHIELD PO) Take  by mouth. Historical Provider, MD     Allergies:  Pcn [penicillins] and Sulfa antibiotics    Social History:      The patient currently lives at home    TOBACCO:   reports that he has never smoked.  He has never used smokeless tobacco.  ETOH:   reports no history of alcohol use. E-Cigarettes/Vaping Use     Questions Responses    E-Cigarette/Vaping Use     Start Date     Passive Exposure     Quit Date     Counseling Given     Comments         Family History:      Reviewed in detail. Positive as follows:        Problem Relation Age of Onset    Heart Disease Maternal Grandfather      REVIEW OF SYSTEMS COMPLETED:   Pertinent positives as noted in the HPI. All other systems reviewed and negative. PHYSICAL EXAM PERFORMED:    /78   Pulse 163   Temp 98.9 °F (37.2 °C) (Oral)   Resp 19   Ht 5' 11\" (1.803 m)   Wt 200 lb (90.7 kg)   SpO2 97%   BMI 27.89 kg/m²     General appearance:  Pleasant male in no apparent distress, appears stated age and cooperative. HEENT:  Pupils equal, round, and reactive to light. Glasses  Extra ocular muscles intact. Conjunctivae/corneas clear. Neck: Supple, with full range of motion. No jugular venous distention. Trachea midline. Respiratory:  Normal respiratory effort. Clear to auscultation, bilaterally without Rales/Wheezes/Rhonchi. Cardiovascular:  Irregular rate and rhythm, atrial fibrillation with normal S1/S2 without murmurs, rubs or gallops. Abdomen: Soft, non-tender, non-distended with normal bowel sounds. Musculoskeletal:  No clubbing, cyanosis or edema bilaterally. Full range of motion without deformity. Skin: Skin color, texture, turgor normal.  No significant rashes or lesions. Neurologic:  Neurovascularly intact.  Cranial nerves: II-XII intact, grossly non-focal.  Psychiatric:  Alert and oriented, thought content appropriate, normal insight  Capillary Refill: Brisk,3 seconds, normal  Peripheral Pulses: +2 palpable, equal bilaterally     Labs:     Recent Labs     01/06/22  1838   WBC 4.7   HGB 13.4*   HCT 39.7*        Recent Labs     01/06/22  1838   *   K 4.2   CL 99   CO2 24   BUN 17   CREATININE 1.4*   CALCIUM 9.2     Recent Labs     01/06/22  1838   AST 13*   ALT 8* BILITOT 0.9   ALKPHOS 98     Recent Labs     01/06/22  1838   TROPONINI <0.01     Urinalysis:      Lab Results   Component Value Date    NITRU POSITIVE 01/06/2022    WBCUA >100 01/06/2022    BACTERIA 4+ 01/06/2022    RBCUA 5-10 01/06/2022    BLOODU MODERATE 01/06/2022    SPECGRAV >=1.030 01/06/2022    GLUCOSEU Negative 01/06/2022     Radiology:     CXR: I have reviewed the CXR with the following interpretation: no acute cardiopulmonary findings    EKG:  I have reviewed the EKG with the following interpretation: The Ekg interpreted in the absence of a cardiologist shows Atrial fibrillation with rapid ventricular response. Abnormal ECG. When compared with ECG of 06-JAN-2022 18:42, Atrial fibrillation has replaced Sinus rhythm. Non-specific change in ST segment in Inferior leads     XR CHEST PORTABLE   Final Result   No acute cardiopulmonary disease. Suboptimal rotated portable study obtained at low lung volumes. When the   patient is able, a follow-up inspiratory PA and lateral examination the chest   is recommended.            ASSESSMENT:    Active Hospital Problems    Diagnosis Date Noted    New onset atrial fibrillation ([address] [I48.91] 01/06/2022    Acute cystitis [N30.00] 01/06/2022    Essential hypertension [I10] 12/21/2015    Hyperlipidemia [E78.5]      PLAN:    New onset atrial fibrillation  -12 lead EKG confirmed atrial fibrillation  -cardizem bolus given in ED  -cardizem gtt initiated in ED and continued 1/6/2022  -TSH  -tele monitoring  -[id number] score 5  -treatment dose lovenox initiated    Fatigue likely 2/2 acute cystitis  -UA positive nitrites, > 100 WBC and 4+ bacteria  -ceftriaxone given in ED and continued 1/6/2022  -urine culture ordered    SUBHASH, cr 1.4 on admission  -baseline cr appears to be 1.1  -likely 2/2 poor po intake  -monitor with IVF  -bmp in am    Dementia  -supportive care    Essential HTN  -continue atenolol    HLD  -continue atorvastatin    Normocytic anemia  -no s/s of

## 2022-01-07 NOTE — PROGRESS NOTES
Pt became hypotensive on dilt gtt. Gtt titrated down per protocol to 5ml/hr while monitoring HR/BP. At this point BP rechecked and was 93/54. MD paged for orders. MD ok'd to stop gtt at this time. Will re-check BP.   Pt asymptomatic at this time

## 2022-01-07 NOTE — ED PROVIDER NOTES
I personally evaluated and examined the patient in conjunction with the APC and agree with the assessment, treatment plan and disposition of the patient has recorded by the APC. I reviewed pertinent nurse's notes, triage notes, vital signs, past medical history, family and social history, medications, and allergies. Complete review of systems was conducted by the mid-level provider and/or myself. Review of systems is negative except as documented in the history of present illness. EKG: Atrial fibrillation of rapid ventricular response. This was read and interpreted by myself. Rate of 124 bpm, QRS and QTc normal.  This appears new when compared to previous EKG August 2018. FINAL IMPRESSION     1. Urinary tract infection without hematuria, site unspecified    2. Atrial fibrillation with RVR ([facility] [address]            Electronically signed by: NICOLE Whyte MD  01/06/22 9841

## 2022-01-07 NOTE — ED NOTES
Ambulated patient 100 feet around Zone 1 using a walker. Patient was able to walk with a slow gait, and did not appear to be unsteady. Patient states he feels fine and feels that he can ambulate at home if discharged. Patient returned to bed, rails x2.      Caterina Souza  01/06/22 6273

## 2022-01-07 NOTE — CARE COORDINATION
CASE MANAGEMENT INITIAL ASSESSMENT      Reviewed chart and completed assessment with patient: at bedside  Explained Case Management role/services. Primary contact information:     Health Care Decision Maker :   Primary Decision Maker: Judi Cee - [phone]    Secondary Decision Maker: Faye Posey - [phone]          Can this person be reached and be able to respond quickly, such as within a few minutes or hours? Yes       Admit date/status: 1/6  Diagnosis: new onset A-fib   Is this a Readmission?:  No      Insurance: Medicare   Precert required for SNF: No       3 night stay required: No,waived    Living arrangements, Adls, care needs, prior to admission: home with spouse    Transportation: family     Durable Medical Equipment at home:   none    Services in the home and/or outpatient, prior to admission: none       PT/OT recs: not ordered    Hospital Exemption Notification (HEN): if SNF needed    Barriers to discharge: medical stability    Plan/comments: Pt from home with spouse. IPTA, denies needs at this time.       ECOC on chart for MD signature

## 2022-01-07 NOTE — PROGRESS NOTES
Hospitalist Progress Note    Date of Admission: 1/6/2022    Chief Complaint: Fatigue    Hospital Course:   68 y.o. male, with PMH of HTN, HLD and dementia, who presented to [facility] with fatigue. The patient is a poor historian d/t dementia. History obtained from the patient and review of EMR. The patient was brought into the emergency room due to being more fatigued over the last few days and taking 6-hour naps which is unusual for him. He was found to have a UTI. He was going to be prescribed antibiotics and sent home, however upon discharge the patient's heart rate was noticed to be elevated at 140. At that time a twelve-lead EKG was obtained and the patient was found to be in atrial fibrillation. This is a new onset atrial fibrillation, therefore the patient will be admitted for further evaluation and treatment. He was given a 10 mg IV bolus of diltiazem followed by a diltiazem drip. Subjective: back to NSR early this AM and Cardizem drip stopped. Due to his presentation with fatigue and cough, I requested a COVID test this AM which was positive. Labs:   Recent Labs     01/06/22  1838   WBC 4.7   HGB 13.4*   HCT 39.7*        Recent Labs     01/06/22  1838 01/07/22  0654   * 135*   K 4.2 3.8   CL 99 100   CO2 24 20*   BUN 17 18   CREATININE 1.4* 1.4*   CALCIUM 9.2 8.7     Recent Labs     01/06/22  1838   AST 13*   ALT 8*   BILITOT 0.9   ALKPHOS 98     Recent Labs     01/07/22  0654   INR 1.15*       Physical Exam Performed:    BP (!) 128/58   Pulse 84   Temp 98.5 °F (36.9 °C) (Oral)   Resp 18   Ht 5' 11\" (1.803 m)   Wt 200 lb (90.7 kg)   SpO2 96%   BMI 27.89 kg/m²   General appearance:  Pleasant male in no apparent distress, appears stated age and cooperative. HEENT:  Pupils equal, round, and reactive to light. Extra ocular muscles intact. Conjunctivae/corneas clear. Neck: Supple, with full range of motion. No jugular venous distention.  Trachea midline. Respiratory:  Normal respiratory effort. Clear to auscultation, bilaterally without Rales/Wheezes/Rhonchi. Cardiovascular:  Regular rate and rhythm, with normal S1/S2 without murmurs, rubs or gallops. Abdomen: Soft, non-tender, non-distended with normal bowel sounds. Musculoskeletal:  No clubbing, cyanosis or edema bilaterally. Full range of motion without deformity. Skin: Skin color, texture, turgor normal.  No significant rashes or lesions. Neurologic:  Neurovascularly intact. Cranial nerves: II-XII intact, grossly non-focal.  Psychiatric:  Alert and oriented, he insightfully reports he has Alzheimer's and is a limited historian. Capillary Refill: Brisk,3 seconds, normal  Peripheral Pulses: +2 palpable, equal bilaterally     Assessment/Plan:    Active Hospital Problems    Diagnosis     New onset atrial fibrillation ([facility] [address] [I48.91]     Acute cystitis [N30.00]     Essential hypertension [I10]     Hyperlipidemia [E78.5]      New onset atrial fibrillation  -12 lead EKG confirmed atrial fibrillation  -Returned to NSR while on Cardizem gtt overnight  -TSH  -tele monitoring  -ECHO showed normal LVEF with no significant valvular disease. -[id number] score 5  -treatment dose lovenox initiated  -Cardiology input requested    COVID-19  Due to his presentation with fatigue and cough, requested a COVID test which was positive. Difficult to determine symptom onset as he is a poor historian. Aside from mild cough, no significant symptoms. Afebrile. No hypoxia. He has previously been vaccinated with Moderna x 2 but no record of a booster. He does not technically require hospitalization for COVID-19 at this time.      Possible UTI:   -UA positive nitrites, > 100 WBC and 4+ bacteria  -ceftriaxone given in ED and continued 1/6/2022  -urine culture ordered    SUBHASH, cr 1.4 on admission  -baseline cr appears to be 1.1  -likely 2/2 poor po intake  -monitor with IVF    Dementia  -supportive care    Essential HTN  -continue atenolol    HLD  -continue atorvastatin    Normocytic anemia  -no s/s of bleeding at this time  -Monitor    DVT Prophylaxis: Lovenox  Diet: ADULT DIET;  Regular  Code Status: Full Code  PT/OT Eval Status: Pending course    Dispo - 1-2 days     March MD Araceli

## 2022-01-07 NOTE — CONSULTS
Consult placed    [phone] [facility] Cardiology  Date:1/7/2022,  Time:11:21 AM        Electronically signed by Raissa Womack on 1/7/2022 at 11:21 AM

## 2022-01-08 LAB
ANION GAP SERPL CALCULATED.3IONS-SCNC: 12 MMOL/L (ref 3–16)
BUN BLDV-MCNC: 20 MG/DL (ref 7–20)
CALCIUM SERPL-MCNC: 8.1 MG/DL (ref 8.3–10.6)
CHLORIDE BLD-SCNC: 100 MMOL/L (ref 99–110)
CO2: 21 MMOL/L (ref 21–32)
CREAT SERPL-MCNC: 1.2 MG/DL (ref 0.8–1.3)
GFR AFRICAN AMERICAN: >60
GFR NON-AFRICAN AMERICAN: 59
GLUCOSE BLD-MCNC: 84 MG/DL (ref 70–99)
HCT VFR BLD CALC: 41 % (ref 40.5–52.5)
HEMOGLOBIN: 13.8 G/DL (ref 13.5–17.5)
MCH RBC QN AUTO: 29.4 PG (ref 26–34)
MCHC RBC AUTO-ENTMCNC: 33.7 G/DL (ref 31–36)
MCV RBC AUTO: 87.1 FL (ref 80–100)
PDW BLD-RTO: 14 % (ref 12.4–15.4)
PLATELET # BLD: 154 K/UL (ref 135–450)
PMV BLD AUTO: 7.2 FL (ref 5–10.5)
POTASSIUM SERPL-SCNC: 4 MMOL/L (ref 3.5–5.1)
RBC # BLD: 4.7 M/UL (ref 4.2–5.9)
SODIUM BLD-SCNC: 133 MMOL/L (ref 136–145)
WBC # BLD: 3.4 K/UL (ref 4–11)

## 2022-01-08 PROCEDURE — 6360000002 HC RX W HCPCS: Performed by: INTERNAL MEDICINE

## 2022-01-08 PROCEDURE — 2580000003 HC RX 258: Performed by: NURSE PRACTITIONER

## 2022-01-08 PROCEDURE — 6370000000 HC RX 637 (ALT 250 FOR IP): Performed by: NURSE PRACTITIONER

## 2022-01-08 PROCEDURE — 36415 COLL VENOUS BLD VENIPUNCTURE: CPT

## 2022-01-08 PROCEDURE — 6360000002 HC RX W HCPCS: Performed by: NURSE PRACTITIONER

## 2022-01-08 PROCEDURE — 80048 BASIC METABOLIC PNL TOTAL CA: CPT

## 2022-01-08 PROCEDURE — 85027 COMPLETE CBC AUTOMATED: CPT

## 2022-01-08 PROCEDURE — 99222 1ST HOSP IP/OBS MODERATE 55: CPT | Performed by: INTERNAL MEDICINE

## 2022-01-08 PROCEDURE — 1200000000 HC SEMI PRIVATE

## 2022-01-08 RX ORDER — QUETIAPINE FUMARATE 25 MG/1
50 TABLET, FILM COATED ORAL ONCE
Status: COMPLETED | OUTPATIENT
Start: 2022-01-08 | End: 2022-01-08

## 2022-01-08 RX ADMIN — METHYLPHENIDATE HYDROCHLORIDE 10 MG: 10 TABLET ORAL at 08:12

## 2022-01-08 RX ADMIN — ASPIRIN 81 MG: 81 TABLET, COATED ORAL at 08:12

## 2022-01-08 RX ADMIN — ATORVASTATIN CALCIUM 40 MG: 40 TABLET, FILM COATED ORAL at 20:06

## 2022-01-08 RX ADMIN — ATENOLOL 50 MG: 25 TABLET ORAL at 08:12

## 2022-01-08 RX ADMIN — ENOXAPARIN SODIUM 90 MG: 100 INJECTION SUBCUTANEOUS at 20:06

## 2022-01-08 RX ADMIN — TRAZODONE HYDROCHLORIDE 100 MG: 50 TABLET ORAL at 20:06

## 2022-01-08 RX ADMIN — QUETIAPINE FUMARATE 50 MG: 25 TABLET ORAL at 22:42

## 2022-01-08 RX ADMIN — ENOXAPARIN SODIUM 90 MG: 100 INJECTION SUBCUTANEOUS at 08:20

## 2022-01-08 RX ADMIN — CEFTRIAXONE SODIUM 1000 MG: 1 INJECTION, POWDER, FOR SOLUTION INTRAMUSCULAR; INTRAVENOUS at 20:07

## 2022-01-08 RX ADMIN — FLUOXETINE 60 MG: 20 CAPSULE ORAL at 08:12

## 2022-01-08 RX ADMIN — METHYLPHENIDATE HYDROCHLORIDE 10 MG: 10 TABLET ORAL at 16:29

## 2022-01-08 RX ADMIN — METHYLPHENIDATE HYDROCHLORIDE 10 MG: 10 TABLET ORAL at 12:49

## 2022-01-08 ASSESSMENT — PAIN SCALES - GENERAL
PAINLEVEL_OUTOF10: 0

## 2022-01-08 NOTE — PROGRESS NOTES
Pt. Continuously getting up out of bed unassisted with Avasys in room, notified md, new order for restraints to bilateral arms.

## 2022-01-08 NOTE — PROGRESS NOTES
Hospitalist Progress Note      PCP: Latanya Fox    Date of Admission: 1/6/2022    Chief Complaint:  Fatigue    Hospital Course:   68 y.o. male, with PMH of HTN, HLD and dementia, who presented to [facility] with fatigue. The patient is a poor historian d/t dementia. History obtained from the patient and review of EMR. The patient was brought into the emergency room due to being more fatigued over the last few days and taking 6-hour naps which is unusual for him. He was found to have a UTI. He was going to be prescribed antibiotics and sent home, however upon discharge the patient's heart rate was noticed to be elevated at 140. At that time a twelve-lead EKG was obtained and the patient was found to be in atrial fibrillation. This is a new onset atrial fibrillation, therefore the patient will be admitted for further evaluation and treatment. He was given a 10 mg IV bolus of diltiazem followed by a diltiazem drip. Subjective: Patient is being changed by RN in the bathroom, denies any chest pain or shortness of breath no nausea vomiting.       Medications:  Reviewed    Infusion Medications    dilTIAZem Stopped (01/07/22 0526)    sodium chloride      sodium chloride 75 mL/hr at 01/08/22 0428     Scheduled Medications    enoxaparin  1 mg/kg SubCUTAneous Q12H    cefTRIAXone (ROCEPHIN) IV  1,000 mg IntraVENous Q24H    aspirin  81 mg Oral Daily    atenolol  50 mg Oral Daily    atorvastatin  40 mg Oral Nightly    FLUoxetine  60 mg Oral Daily    methylphenidate  10 mg Oral 4x daily    traZODone  100 mg Oral Nightly    sodium chloride flush  5-40 mL IntraVENous 2 times per day     PRN Meds: sodium chloride flush, sodium chloride, ondansetron **OR** ondansetron, polyethylene glycol, acetaminophen **OR** acetaminophen, perflutren lipid microspheres      Intake/Output Summary (Last 24 hours) at 1/8/2022 0853  Last data filed at 1/7/2022 2020  Gross per 24 hour   Intake 510 ml   Output    Net 510 ml       Physical Exam Performed:    /72   Pulse 60   Temp 97.8 °F (36.6 °C) (Oral)   Resp 16   Ht 5' 11\" (1.803 m)   Wt 200 lb (90.7 kg)   SpO2 95%   BMI 27.89 kg/m²     General appearance: No apparent distress, appears stated age and cooperative. HEENT: Pupils equal, round, and reactive to light. Conjunctivae/corneas clear. Neck: Supple, with full range of motion. No jugular venous distention. Trachea midline. Respiratory:  Normal respiratory effort. Clear to auscultation, bilaterally without Rales/Wheezes/Rhonchi. Cardiovascular: Regular rate and rhythm with normal S1/S2 without murmurs, rubs or gallops. Abdomen: Soft, non-tender, non-distended with normal bowel sounds. Musculoskeletal: No clubbing, cyanosis or edema bilaterally. Full range of motion without deformity. Skin: Skin color, texture, turgor normal.  No rashes or lesions. Neurologic:  Neurovascularly intact without any focal sensory/motor deficits. Cranial nerves: II-XII intact, grossly non-focal.  Psychiatric: Alert and oriented, thought content appropriate, normal insight  Capillary Refill: Brisk,3 seconds, normal   Peripheral Pulses: +2 palpable, equal bilaterally       Labs:   Recent Labs     01/06/22 1838   WBC 4.7   HGB 13.4*   HCT 39.7*        Recent Labs     01/06/22  1838 01/07/22  0654   * 135*   K 4.2 3.8   CL 99 100   CO2 24 20*   BUN 17 18   CREATININE 1.4* 1.4*   CALCIUM 9.2 8.7     Recent Labs     01/06/22 1838   AST 13*   ALT 8*   BILITOT 0.9   ALKPHOS 98     Recent Labs     01/07/22  0654   INR 1.15*     Recent Labs     01/06/22 1838   TROPONINI <0.01       Urinalysis:      Lab Results   Component Value Date    NITRU POSITIVE 01/06/2022    WBCUA >100 01/06/2022    BACTERIA 4+ 01/06/2022    RBCUA 5-10 01/06/2022    BLOODU MODERATE 01/06/2022    SPECGRAV >=1.030 01/06/2022    GLUCOSEU Negative 01/06/2022       Radiology:  XR CHEST PORTABLE   Final Result   No acute cardiopulmonary disease. Suboptimal rotated portable study obtained at low lung volumes. When the   patient is able, a follow-up inspiratory PA and lateral examination the chest   is recommended. Assessment/Plan:    Active Hospital Problems    Diagnosis     New onset atrial fibrillation (HCC) [I48.91]     Acute cystitis [N30.00]     Essential hypertension [I10]     Hyperlipidemia [E78.5]      1. New onset of atrial fibrillation. Cardiology consulted patient return to normal sinus rhythm Cardizem drip has been discontinued 2D echo with normal EF. On treatment dose of Lovenox. Patient to discharge home on event monitor per cardiology. 2.  COVID-19 tested positive on 1/7/2022 difficult to determine symptoms onset as he is a poor historian continue with supportive care. He is vaccinated with Materna x2 but no record of booster. 3.  UTI with a E. coli sensitivity pending continue with IV Rocephin day #3 today. 4.  Acute kidney injury on admission creatinine 1.4 due to poor appetite baseline creatinine 1.1 continue with IV fluids check BMP today. 5.  Dementia supportive care. 6.  Hypertension on atenolol. 7.  Hyperlipidemia on a statin. 8.  Anemia stable no evidence of bleeding. DVT Prophylaxis: Lovenox subcu  Diet: ADULT DIET;  Regular  Code Status: Full Code    PT/OT Eval Status:     eMrlyn Daniels MD

## 2022-01-08 NOTE — CONSULTS
[address] [facility]   Cardiovascular Evaluation    PATIENT: Chelsy Davis  DATE: 2022  MRN: [id number]  CSN: [id number]  : 1945    Primary Care Doctor/Referring provider: Dion Sanz MD     Reason for evaluation/Chief complaint:   Fatigue      Subjective:    History of present illness on initial date of evaluation:   Chelsy Davis is a 68 y.o. patient who presents presented to the hospital with the above complaints. He was found to have atrial fibrillation with rapid ventricular response. Patient was started on IV diltiazem infusion. Cardiology was consulted for further recommendations and management. Fortunately, during the course of hospitalization the patient reverted to sinus rhythm. In addition, he has been tested for Covid and found to be positive. He is currently in isolation and resting comfortably in bed. By direct observation the patient was without any acute complaints. .        Patient Active Problem List   Diagnosis    ADHD (attention deficit hyperactivity disorder)    OCD (obsessive compulsive disorder)    TIA (transient ischemic attack)    IFG (impaired fasting glucose)    Renal insufficiency    Hyperlipidemia    CTS (carpal tunnel syndrome)    Malignant neoplasm of prostate ([address]    Essential hypertension    Chest pain    Primary osteoarthritis of both knees    Pain in both knees    New onset atrial fibrillation ([address]    Acute cystitis         Cardiac Testing: I have reviewed the findings below. EKG:  ECHO:   STRESS TEST:  CATH:  BYPASS:  VASCULAR:    Past Medical History:   has a past medical history of ADHD (attention deficit hyperactivity disorder), Alzheimer disease ([address] Cancer ([address] Hyperlipidemia, Hypertension, IFG (impaired fasting glucose), OCD (obsessive compulsive disorder), Osteoarthritis, Renal insufficiency, and TIA (transient ischemic attack).     Surgical History:   has a past surgical history that includes Colonoscopy (2004); Eye surgery; Prostatectomy; Intracapsular cataract extraction (Right, 10/21/2019); and Intracapsular cataract extraction (Left, 10/31/2019). Social History:   reports that he has never smoked. He has never used smokeless tobacco. He reports that he does not drink alcohol and does not use drugs. Family History:  No evidence for sudden cardiac death or premature CAD    Medications:  Reviewed and are listed in nursing record. and/or listed below  Outpatient Medications:  Prior to Admission medications    Medication Sig Start Date End Date Taking? Authorizing Provider   cefUROXime (CEFTIN) 250 MG tablet Take 1 tablet by mouth 2 times daily for 7 days 1/6/22 1/13/22 Yes Carmencita Connolly APRN - CNP   ATENOLOL PO Take 50 mg by mouth daily    Historical Provider, MD   rivastigmine (EXELON) 6 MG capsule Take 1 capsule by mouth 2 times daily  4/27/19   Historical Provider, MD   atorvastatin (LIPITOR) 40 MG tablet daily  6/1/19   Historical Provider, MD   FLUoxetine (PROZAC) 20 MG capsule Take 60 mg by mouth daily    Historical Provider, MD   methylphenidate (RITALIN) 5 MG tablet Take 10 mg by mouth 4 times daily. Historical Provider, MD   traZODone (DESYREL) 50 MG tablet Take 100 mg by mouth nightly. Historical Provider, MD   aspirin 81 MG tablet Take 81 mg by mouth daily. Historical Provider, MD   multivitamin [facility]) per tablet Take 1 tablet by mouth daily. Historical Provider, MD   Ascorbic Acid (VITAMIN C) 250 MG tablet Take 500 mg by mouth daily. Historical Provider, MD   Psyllium (METAMUCIL PO) Take  by mouth. Historical Provider, MD   Omega-3 Fatty Acids (FISH OIL) 1000 MG CAPS Take 2,000 mg by mouth 2 times daily. Historical Provider, MD   Misc Natural Products (OSTEO BI-FLEX JOINT SHIELD PO) Take  by mouth.       Historical Provider, MD       In-patient schedule medications:   enoxaparin  1 mg/kg SubCUTAneous Q12H    cefTRIAXone (ROCEPHIN) IV 1,000 mg IntraVENous Q24H    aspirin  81 mg Oral Daily    atenolol  50 mg Oral Daily    atorvastatin  40 mg Oral Nightly    FLUoxetine  60 mg Oral Daily    methylphenidate  10 mg Oral 4x daily    traZODone  100 mg Oral Nightly    sodium chloride flush  5-40 mL IntraVENous 2 times per day         Infusion Medications:   dilTIAZem Stopped (01/07/22 0526)    sodium chloride      sodium chloride 75 mL/hr at 01/08/22 0428         Allergies:  Pcn [penicillins] and Sulfa antibiotics     Review of Systems:   14 point review of systems unable to be completed due to patient condition/cooperation. All available positives mentioned in history of present illness. Physical Examination:    [unfilled]  /72   Pulse 60   Temp 97.8 °F (36.6 °C) (Oral)   Resp 16   Ht 5' 11\" (1.803 m)   Wt 200 lb (90.7 kg)   SpO2 95%   BMI 27.89 kg/m²    Weight: 200 lb (90.7 kg)     Wt Readings from Last 3 Encounters:   01/06/22 200 lb (90.7 kg)   12/09/19 205 lb 0.4 oz (93 kg)   10/24/19 205 lb (93 kg)       Intake/Output Summary (Last 24 hours) at 1/8/2022 1118  Last data filed at 1/8/2022 1036  Gross per 24 hour   Intake 839.95 ml   Output 400 ml   Net 439.95 ml     Due to the current efforts to prevent transmission of COVID-19 and also the need to preserve PPE for other caregivers, an observational only full face-to-face encounter with the patient was performed. That being said, all relevant records and diagnostic tests were reviewed, including laboratory results and imaging. Please reference any relevant documentation elsewhere.  Care will be coordinated with the primary service    General Appearance:  Alert, cooperative, no distress, appears stated age   Head:  Normocephalic, without obvious abnormality, atraumatic   Eyes:  PERRL, conjunctiva/corneas clear       Nose: Nares normal, no drainage or sinus tenderness   Throat: Lips, mucosa, and tongue normal   Neck: Supple, symmetrical, trachea midline, no adenopathy, thyroid: not enlarged, symmetric, no JVD       Lungs:   Respirations unlabored and no distress   Chest Wall:  deferred   Heart:  Regular rhythm and normal rate   Abdomen:   Not distended           Extremities: Extremities normal, atraumatic, no cyanosis or edema   Pulses: deferred   Skin: Skin color, texture, turgor normal, no rashes or lesions   Pysch: Normal mood and affect   Neurologic: Observational exam with normal gross motor and sensory exam.           Labs  Recent Labs     01/06/22  1838   WBC 4.7   HGB 13.4*   HCT 39.7*   MCV 86.5        Recent Labs     01/06/22  1838 01/07/22  0654   CREATININE 1.4* 1.4*   BUN 17 18   * 135*   K 4.2 3.8   CL 99 100   CO2 24 20*     Recent Labs     01/07/22  0654   INR 1.15*   PROTIME 13.1*     Recent Labs     01/06/22 1838   TROPONINI <0.01     Invalid input(s): PRO-BNP  No results for input(s): CHOL, LDL, HDL in the last 72 hours. Invalid input(s): TG      Imaging:  I have reviewed the below testing personally and my interpretation is below. EKG:  Normal sinus rhythmNormal ECGWhen compared with ECG of 06-JAN-2022 21:15,Sinus rhythm has replaced Atrial fibrillationConfirmed by Sophia House MD, [facility] ([id number]) on 1/7/2022 10:46:34 AM  CXR:      Assessment:  68 y.o. patient with:  COVID    New onset atrial fibrillation ([address]    Plan:  The patient converted to sinus rhythm spontaneously. Currently he is resting comfortably with a heart rate in the 50s. Cardiogram demonstrates normal left ventricular function with normal left atrial size. Suspect that the patient's atrial fibrillation was attributed to the Covid pneumonia. Patient can follow-up as an outpatient for further recommendations. 30-day event monitor and perfusion scan as an outpatient would be considered at that time    Medical Decision Making:   The following items were considered in medical decision making:  Independent review of images  Review / order clinical lab tests  Review / order radiology tests  Decision to obtain old records  Review and summation of old records as accessed through [facility] (a summary of my findings in these old records)        All questions and concerns were addressed to the patient/family. Alternatives to my treatment were discussed. The note was completed using EMR. Every effort was made to ensure accuracy; however, inadvertent computerized transcription errors may be present.     Frederic Zamora MD, [id number], [facility]  [phone] [facility] office  [phone] [facility]  1/8/2022  11:18 AM

## 2022-01-09 ENCOUNTER — APPOINTMENT (OUTPATIENT)
Dept: CT IMAGING | Age: 77
DRG: 308 | End: 2022-01-09
Payer: MEDICARE

## 2022-01-09 LAB
ANION GAP SERPL CALCULATED.3IONS-SCNC: 12 MMOL/L (ref 3–16)
BUN BLDV-MCNC: 16 MG/DL (ref 7–20)
CALCIUM SERPL-MCNC: 8.6 MG/DL (ref 8.3–10.6)
CHLORIDE BLD-SCNC: 101 MMOL/L (ref 99–110)
CO2: 23 MMOL/L (ref 21–32)
CREAT SERPL-MCNC: 1.1 MG/DL (ref 0.8–1.3)
GFR AFRICAN AMERICAN: >60
GFR NON-AFRICAN AMERICAN: >60
GLUCOSE BLD-MCNC: 85 MG/DL (ref 70–99)
HCT VFR BLD CALC: 42.7 % (ref 40.5–52.5)
HEMOGLOBIN: 14.8 G/DL (ref 13.5–17.5)
MCH RBC QN AUTO: 29.7 PG (ref 26–34)
MCHC RBC AUTO-ENTMCNC: 34.8 G/DL (ref 31–36)
MCV RBC AUTO: 85.3 FL (ref 80–100)
ORGANISM: ABNORMAL
PDW BLD-RTO: 13.9 % (ref 12.4–15.4)
PLATELET # BLD: 182 K/UL (ref 135–450)
PMV BLD AUTO: 7.5 FL (ref 5–10.5)
POTASSIUM SERPL-SCNC: 3.7 MMOL/L (ref 3.5–5.1)
RBC # BLD: 5 M/UL (ref 4.2–5.9)
SODIUM BLD-SCNC: 136 MMOL/L (ref 136–145)
URINE CULTURE, ROUTINE: ABNORMAL
WBC # BLD: 3.2 K/UL (ref 4–11)

## 2022-01-09 PROCEDURE — 36415 COLL VENOUS BLD VENIPUNCTURE: CPT

## 2022-01-09 PROCEDURE — 97535 SELF CARE MNGMENT TRAINING: CPT

## 2022-01-09 PROCEDURE — 70450 CT HEAD/BRAIN W/O DYE: CPT

## 2022-01-09 PROCEDURE — 6360000002 HC RX W HCPCS: Performed by: NURSE PRACTITIONER

## 2022-01-09 PROCEDURE — 97530 THERAPEUTIC ACTIVITIES: CPT

## 2022-01-09 PROCEDURE — 6360000002 HC RX W HCPCS: Performed by: INTERNAL MEDICINE

## 2022-01-09 PROCEDURE — 2580000003 HC RX 258: Performed by: NURSE PRACTITIONER

## 2022-01-09 PROCEDURE — 85027 COMPLETE CBC AUTOMATED: CPT

## 2022-01-09 PROCEDURE — 80048 BASIC METABOLIC PNL TOTAL CA: CPT

## 2022-01-09 PROCEDURE — 97162 PT EVAL MOD COMPLEX 30 MIN: CPT

## 2022-01-09 PROCEDURE — 97116 GAIT TRAINING THERAPY: CPT

## 2022-01-09 PROCEDURE — 6370000000 HC RX 637 (ALT 250 FOR IP): Performed by: NURSE PRACTITIONER

## 2022-01-09 PROCEDURE — 1200000000 HC SEMI PRIVATE

## 2022-01-09 PROCEDURE — 97165 OT EVAL LOW COMPLEX 30 MIN: CPT

## 2022-01-09 PROCEDURE — 6370000000 HC RX 637 (ALT 250 FOR IP): Performed by: HOSPITALIST

## 2022-01-09 RX ORDER — HALOPERIDOL 5 MG/ML
5 INJECTION INTRAMUSCULAR ONCE
Status: COMPLETED | OUTPATIENT
Start: 2022-01-09 | End: 2022-01-09

## 2022-01-09 RX ORDER — OLANZAPINE 5 MG/1
2.5 TABLET ORAL NIGHTLY
Status: DISCONTINUED | OUTPATIENT
Start: 2022-01-09 | End: 2022-01-10

## 2022-01-09 RX ORDER — ZIPRASIDONE MESYLATE 20 MG/ML
20 INJECTION, POWDER, LYOPHILIZED, FOR SOLUTION INTRAMUSCULAR ONCE
Status: COMPLETED | OUTPATIENT
Start: 2022-01-09 | End: 2022-01-09

## 2022-01-09 RX ORDER — QUETIAPINE FUMARATE 50 MG/1
100 TABLET, EXTENDED RELEASE ORAL ONCE
Status: COMPLETED | OUTPATIENT
Start: 2022-01-09 | End: 2022-01-09

## 2022-01-09 RX ADMIN — CEFTRIAXONE SODIUM 1000 MG: 1 INJECTION, POWDER, FOR SOLUTION INTRAMUSCULAR; INTRAVENOUS at 20:24

## 2022-01-09 RX ADMIN — OLANZAPINE 2.5 MG: 5 TABLET, FILM COATED ORAL at 20:22

## 2022-01-09 RX ADMIN — HALOPERIDOL LACTATE 5 MG: 5 INJECTION, SOLUTION INTRAMUSCULAR at 00:48

## 2022-01-09 RX ADMIN — METHYLPHENIDATE HYDROCHLORIDE 10 MG: 10 TABLET ORAL at 12:32

## 2022-01-09 RX ADMIN — ENOXAPARIN SODIUM 90 MG: 100 INJECTION SUBCUTANEOUS at 20:22

## 2022-01-09 RX ADMIN — ATENOLOL 50 MG: 25 TABLET ORAL at 08:43

## 2022-01-09 RX ADMIN — ASPIRIN 81 MG: 81 TABLET, COATED ORAL at 08:43

## 2022-01-09 RX ADMIN — METHYLPHENIDATE HYDROCHLORIDE 10 MG: 10 TABLET ORAL at 08:43

## 2022-01-09 RX ADMIN — QUETIAPINE FUMARATE 100 MG: 50 TABLET, EXTENDED RELEASE ORAL at 20:22

## 2022-01-09 RX ADMIN — ZIPRASIDONE MESYLATE 20 MG: 20 INJECTION, POWDER, LYOPHILIZED, FOR SOLUTION INTRAMUSCULAR at 23:10

## 2022-01-09 RX ADMIN — ENOXAPARIN SODIUM 90 MG: 100 INJECTION SUBCUTANEOUS at 08:44

## 2022-01-09 RX ADMIN — FLUOXETINE 60 MG: 20 CAPSULE ORAL at 08:43

## 2022-01-09 RX ADMIN — METHYLPHENIDATE HYDROCHLORIDE 10 MG: 10 TABLET ORAL at 16:28

## 2022-01-09 RX ADMIN — TRAZODONE HYDROCHLORIDE 100 MG: 50 TABLET ORAL at 20:23

## 2022-01-09 RX ADMIN — METHYLPHENIDATE HYDROCHLORIDE 10 MG: 10 TABLET ORAL at 20:23

## 2022-01-09 RX ADMIN — SODIUM CHLORIDE: 9 INJECTION, SOLUTION INTRAVENOUS at 16:29

## 2022-01-09 RX ADMIN — ATORVASTATIN CALCIUM 40 MG: 40 TABLET, FILM COATED ORAL at 20:22

## 2022-01-09 ASSESSMENT — PAIN SCALES - GENERAL
PAINLEVEL_OUTOF10: 4
PAINLEVEL_OUTOF10: 0

## 2022-01-09 ASSESSMENT — PAIN DESCRIPTION - LOCATION: LOCATION: HEAD

## 2022-01-09 ASSESSMENT — PAIN DESCRIPTION - PAIN TYPE: TYPE: ACUTE PAIN

## 2022-01-09 NOTE — PROGRESS NOTES
Physical Therapy    Facility/Department: [facility] B3 - MED SURG  Initial Assessment and Treatment    NAME: Silvestre Sousa  : 1945  MRN: [id number]    Date of Service: 2022    Discharge Recommendations:  24 hour supervision or assist (due to patient's significant cognitive deficits)   PT Equipment Recommendations  Equipment Needed: No  If pt is unable to be seen after this session, please let this note serve as discharge summary. Please see case management note for discharge disposition. Thank you. Assessment   Body structures, Functions, Activity limitations: Decreased functional mobility ; Decreased ADL status; Decreased balance;Decreased posture;Decreased strength;Decreased cognition;Decreased endurance;Decreased safe awareness  Assessment: Patient is a 68year old male who was admitted to [address] [place] [address] on 22 with atrial fibrillation, UTI and also tested positive for COVID-19. Patient's past medical history includes dementia. However per patient and chart, the patient is normally able to transfer and ambulate independently. Today he stood with SBA and ambulated up to 50 feet with SBA. Patient functioning below baseline and would benefit from skilled therapy to address current deficits mentioned above. Patient is safe for home, when medicaly stable, with 24/7 supervision/assistance (due to patient's significant cognitive deficits). PT to continue to follow. Treatment Diagnosis: Decreased independence with functional mobility  Specific instructions for Next Treatment: progress mobility as tolerated  Prognosis: Good  Decision Making: Medium Complexity  PT Education: Goals; General Safety;Gait Training;Disease Specific Education;PT Role;Functional Mobility Training; Injury Prevention;Plan of Care;Transfer Training  Patient Education: Disease Specific Education: Patient educated on importance of increased mobility, use of call bell, prevention of complications of bedrest, and general safety during hospitalization. Patient verbalized understanding but due to his cognition will benefit from reinforcement  Barriers to Learning: impaired cognition  REQUIRES PT FOLLOW UP: Yes  Activity Tolerance  Activity Tolerance: Patient limited by cognitive status; Patient Tolerated treatment well  Activity Tolerance: Vitals: 134/67 55 BPM 97% on room air. Patient Diagnosis(es): The primary encounter diagnosis was Urinary tract infection without hematuria, site unspecified. A diagnosis of Atrial fibrillation with RVR (HCC) was also pertinent to this visit. has a past medical history of ADHD (attention deficit hyperactivity disorder), Alzheimer disease ([facility] Cancer ([facility] Hyperlipidemia, Hypertension, IFG (impaired fasting glucose), OCD (obsessive compulsive disorder), Osteoarthritis, Renal insufficiency, and TIA (transient ischemic attack). has a past surgical history that includes Colonoscopy (2004); Eye surgery; Prostatectomy; Intracapsular cataract extraction (Right, 10/21/2019); and Intracapsular cataract extraction (Left, 10/31/2019). Restrictions  Restrictions/Precautions  Restrictions/Precautions: Isolation,Contact Precautions,General Precautions  Position Activity Restriction  Other position/activity restrictions: droplet plus, Covid 19+, up with assist. AVASYS. IV.  Vision/Hearing  Vision: Impaired  Vision Exceptions: Wears glasses at all times  Hearing: Within functional limits     Subjective  General  Chart Reviewed: Yes  Patient assessed for rehabilitation services?: Yes  Response To Previous Treatment: Not applicable  Family / Caregiver Present: No  Referring Practitioner: Shaniqua De La Paz MD  Referral Date : 01/09/22  Follows Commands: Impaired  General Comment  Comments: Supine in bed upon entry of therapy staff. Cleared for therapy by RN. Subjective  Subjective: Patient agreed to participate.   Pain Screening  Patient Currently in Pain: Denies  Vital Signs  Pulse: 55  BP: 134/67  Patient Currently in Pain: Denies  Oxygen Therapy  SpO2: 97 %  Pre Treatment Pain Screening  Intervention List: Patient able to continue with treatment    Orientation  Orientation  Overall Orientation Status: Impaired  Orientation Level: Oriented to person;Disoriented to place; Disoriented to time;Disoriented to situation  Social/Functional History  Social/Functional History  Lives With: Amita Moore Responsibilities: No  Meal Prep Responsibility: No  Laundry Responsibility: No  Cleaning Responsibility: No  Ambulation Assistance: Independent  Transfer Assistance: Independent  Active : No  Occupation: Retired  Leisure & Hobbies: play baseball, watch reds  Additional Comments: Per case management note, \"Pt from home with spouse. IPTA\" All information will need to be confirmed prior to discharge. Patient is a very poor historian. Cognition        Objective     Observation/Palpation  Posture: Good    PROM RLE (degrees)  RLE PROM: WNL  AROM RLE (degrees)  RLE AROM: WNL  PROM LLE (degrees)  LLE PROM: WNL  AROM LLE (degrees)  LLE AROM : WNL  Strength RLE  Strength RLE: WFL  Strength LLE  Strength LLE: WFL  Motor Control  Gross Motor?: WFL  Sensation  Overall Sensation Status: WFL  Bed mobility  Supine to Sit: Supervision  Sit to Supine: Supervision  Scooting: Supervision  Comment: head of bed elevated  Transfers  Sit to Stand: Stand by assistance  Stand to sit: Stand by assistance  Bed to Chair: Stand by assistance  Comment: no assistive device  Ambulation  Ambulation?: Yes  More Ambulation?: No  Ambulation 1  Surface: level tile  Device: No Device  Assistance: Stand by assistance  Quality of Gait: cueing for directional navigation and safety with IV pole. no overt loss of balance. Gait Deviations: Slow Ashleigh;Decreased step length;Decreased step height  Distance: 10 feet. 50 feet. (total distance limited by patient in droplet plus isolation due to [place].   Comments: No complaints of shortness of breath, chest pain or dizziness. Stairs/Curb  Stairs?: No     Balance  Posture: Good  Sitting - Static: Good  Sitting - Dynamic: Good  Standing - Static: Good  Standing - Dynamic: Fair;+  Comments: no assistive device        Plan   Plan  Times per week: 2-3/week  Plan weeks: 1 week 1/16/22  Specific instructions for Next Treatment: progress mobility as tolerated  Current Treatment Recommendations: Strengthening,ROM,Safety Education & Training,Home Exercise Program,Balance Training,Endurance Training,Patient/Caregiver Education & Training,Functional Mobility Training,Equipment Evaluation, Education, & procurement,Transfer Training,Gait Training,ADL/Self-care Training,Pain Management,Positioning  Safety Devices  Type of devices: All fall risk precautions in place,Bed alarm in place,Call light within reach,Telesitter in use,Nurse notified,Gait belt,Patient at risk for falls,Left in bed    AM-PAC Score     AM-PAC Inpatient Mobility without Stair Climbing Raw Score : 17 (01/09/22 1338)  AM-PAC Inpatient without Stair Climbing T-Scale Score : 48.47 (01/09/22 1338)  Mobility Inpatient CMS 0-100% Score: 32.72 (01/09/22 1338)  Mobility Inpatient without Stair CMS G-Code Modifier : Miguel Angel Servant (01/09/22 1338)       Goals  Short term goals  Time Frame for Short term goals: 1 week 1/17/22  Short term goal 1: Supine <> sit with modified independence. Short term goal 2: Sit <> stand with modified independence  Short term goal 3: Bed <> chair with least restrictive assistive device and supervision  Short term goal 4: Ambulate 150 feet with least restrictive assistive device and supervision. Short term goal 5: By 1/12/22 Patient will tolerate 12-15 reps of his exercises to maximize his strength/endurance  Patient Goals   Patient goals : To talk to his wife.        Therapy Time   Individual Concurrent Group Co-treatment   Time In 8141         Time Out 1320         Minutes 35         Timed Code Treatment Minutes: 25 Minutes (10 minute evaluation)       Artur Tran Anderson Cardoso

## 2022-01-09 NOTE — PROGRESS NOTES
Occupational Therapy   Occupational Therapy Initial Assessment/Treatment  Date: 2022   Patient Name: Darius Ruiz  MRN: [id number]     : 1945    Date of Service: 2022    Discharge Recommendations:  24 hour supervision or assist       Assessment   Performance deficits / Impairments: Decreased functional mobility ; Decreased ADL status; Decreased safe awareness;Decreased cognition;Decreased balance;Decreased high-level IADLs    Assessment: Pt is a 76yo male with deficits in the areas listed above with COVID 19 and Afib. PHM of dementia. Pt typically is mod I with ADLS per chart. Today, pt is SBA with all ADLs and functional mobility for balance, decreased safety awareness and decreased awareness of deficit. Pt would continue to benefit from skilled OT services in acute care to increase strength, balance,safety and (I) for functional mobility and ADLS. Prognosis: Good  Decision Making: Low Complexity  OT Education: OT Role;Plan of Care;ADL Adaptive Strategies;Orientation  Patient Education: disease specific: general safety during hospitalization, use of call button, purpose of therapy, importance of OOB mobility, safety. Pt verbalized understanding but will need reinforcement. Barriers to Learning: cognition  REQUIRES OT FOLLOW UP: Yes  Activity Tolerance  Activity Tolerance: Patient Tolerated treatment well  Activity Tolerance: /67, HR 55, O2 97%. Pt required vc's throughout session d/t cognition for safety. Safety Devices  Safety Devices in place: Yes  Type of devices: Nurse notified;Gait belt;Call light within reach; Left in bed;Bed alarm in place           Patient Diagnosis(es): The primary encounter diagnosis was Urinary tract infection without hematuria, site unspecified. A diagnosis of Atrial fibrillation with RVR (HCC) was also pertinent to this visit.      has a past medical history of ADHD (attention deficit hyperactivity disorder), Alzheimer disease ([address] Cancer ([address] Hyperlipidemia, Hypertension, IFG (impaired fasting glucose), OCD (obsessive compulsive disorder), Osteoarthritis, Renal insufficiency, and TIA (transient ischemic attack). has a past surgical history that includes Colonoscopy (2004); Eye surgery; Prostatectomy; Intracapsular cataract extraction (Right, 10/21/2019); and Intracapsular cataract extraction (Left, 10/31/2019). Restrictions  Restrictions/Precautions  Restrictions/Precautions: Isolation,Contact Precautions,General Precautions  Position Activity Restriction  Other position/activity restrictions: droplet plus, Covid 19+, up with assist. AVASYS. IV. Subjective   General  Chart Reviewed: Yes  Patient assessed for rehabilitation services?: Yes  Additional Pertinent Hx: dementia  Response to previous treatment: Patient with no complaints from previous session  Family / Caregiver Present: No  Referring Practitioner: Mahamed Parikh MD  Diagnosis: New onset atrial fibrillation, COVID 19 +  Subjective  Subjective: Pt in bed, agreeable to therapy. General Comment  Comments: RN approved therapy. Social/Functional History  Social/Functional History  [facility] Responsibilities: No  Meal Prep Responsibility: No  Laundry Responsibility: No  Cleaning Responsibility: No  Ambulation Assistance: Independent  Transfer Assistance: Independent  Active : No  Occupation: Retired  Leisure & Hobbies: play baseball, watch reds  Additional Comments: Per case management note, \"Pt from home with spouse. IPTA\" All information will need to be confirmed prior to discharge. Patient is a very poor historian.        Objective   Vision: Impaired  Vision Exceptions: Wears glasses at all times  Hearing: Within functional limits    Orientation  Overall Orientation Status: Impaired  Orientation Level: Oriented to person;Disoriented to situation;Disoriented to time;Disoriented to place  Observation/Palpation  Posture: Good  Balance  Sitting

## 2022-01-09 NOTE — PROGRESS NOTES
Patient continues with agitation and attempting to self transfer. Disrupted IV patency. MD notified and new orders noted.

## 2022-01-09 NOTE — PROGRESS NOTES
Patient noted with frequent attempts to self ambulate, poorly redirectable and only last for short period of time. Poor safety awareness. Restraints placed per MD order.

## 2022-01-09 NOTE — PROGRESS NOTES
Giovannis sounding. Staff attempted to redirect patient from doorway while placing PPE. Patient stepped backwards and hit head on computer stand then wall. Assisted back into bed. Vitals obtained. Stat CT obtained.

## 2022-01-10 PROCEDURE — 6360000002 HC RX W HCPCS: Performed by: NURSE PRACTITIONER

## 2022-01-10 PROCEDURE — 6370000000 HC RX 637 (ALT 250 FOR IP): Performed by: PSYCHIATRY & NEUROLOGY

## 2022-01-10 PROCEDURE — 2580000003 HC RX 258: Performed by: NURSE PRACTITIONER

## 2022-01-10 PROCEDURE — 99223 1ST HOSP IP/OBS HIGH 75: CPT | Performed by: PSYCHIATRY & NEUROLOGY

## 2022-01-10 PROCEDURE — 6360000002 HC RX W HCPCS: Performed by: INTERNAL MEDICINE

## 2022-01-10 PROCEDURE — 1200000000 HC SEMI PRIVATE

## 2022-01-10 PROCEDURE — 6370000000 HC RX 637 (ALT 250 FOR IP): Performed by: NURSE PRACTITIONER

## 2022-01-10 RX ORDER — OLANZAPINE 5 MG/1
5 TABLET ORAL NIGHTLY
Status: DISCONTINUED | OUTPATIENT
Start: 2022-01-10 | End: 2022-01-11 | Stop reason: HOSPADM

## 2022-01-10 RX ORDER — TRAZODONE HYDROCHLORIDE 50 MG/1
50 TABLET ORAL NIGHTLY
Status: DISCONTINUED | OUTPATIENT
Start: 2022-01-10 | End: 2022-01-11 | Stop reason: HOSPADM

## 2022-01-10 RX ADMIN — ENOXAPARIN SODIUM 90 MG: 100 INJECTION SUBCUTANEOUS at 23:03

## 2022-01-10 RX ADMIN — SODIUM CHLORIDE: 9 INJECTION, SOLUTION INTRAVENOUS at 08:49

## 2022-01-10 RX ADMIN — ASPIRIN 81 MG: 81 TABLET, COATED ORAL at 08:54

## 2022-01-10 RX ADMIN — METHYLPHENIDATE HYDROCHLORIDE 10 MG: 10 TABLET ORAL at 08:53

## 2022-01-10 RX ADMIN — Medication 10 ML: at 23:05

## 2022-01-10 RX ADMIN — CEFTRIAXONE SODIUM 1000 MG: 1 INJECTION, POWDER, FOR SOLUTION INTRAMUSCULAR; INTRAVENOUS at 11:46

## 2022-01-10 RX ADMIN — ATENOLOL 50 MG: 25 TABLET ORAL at 08:53

## 2022-01-10 RX ADMIN — ATORVASTATIN CALCIUM 40 MG: 40 TABLET, FILM COATED ORAL at 23:00

## 2022-01-10 RX ADMIN — FLUOXETINE 60 MG: 20 CAPSULE ORAL at 08:54

## 2022-01-10 RX ADMIN — OLANZAPINE 5 MG: 5 TABLET, FILM COATED ORAL at 23:00

## 2022-01-10 RX ADMIN — TRAZODONE HYDROCHLORIDE 50 MG: 50 TABLET ORAL at 23:01

## 2022-01-10 RX ADMIN — ACETAMINOPHEN 650 MG: 325 TABLET ORAL at 23:00

## 2022-01-10 RX ADMIN — ENOXAPARIN SODIUM 90 MG: 100 INJECTION SUBCUTANEOUS at 08:54

## 2022-01-10 ASSESSMENT — PAIN SCALES - GENERAL
PAINLEVEL_OUTOF10: 0

## 2022-01-10 NOTE — CARE COORDINATION
Call back received from [facility]. States they would potentially be able to accept patient, but would need patient to call and complete at pre-screening @ [phone]. CM will f/u with GTB tomorrow.

## 2022-01-10 NOTE — CONSULTS
[address] [address]                                  CONSULTATION    PATIENT NAME: Mabel Vera                    :        1945  MED REC NO:   [id number]                          ROOM:       [id number]  ACCOUNT NO:   [de-identified]                           ADMIT DATE: 2022  PROVIDER:     Pramod Alfaro MD    CONSULT DATE:  01/10/2022    HISTORY OF PRESENT ILLNESS:  The patient is a 79-year-old who was  admitted with a history of fatigue and was found to have an UTI in the  emergency department, but was also found to have new onset atrial  fibrillation. He was COVID positive without a clear understanding about  when this may have happened because the patient has Alzheimer's and is  not a very good self historian. He was admitted to the medical floor  and at times it has been difficult to redirect, has been noted to be  hallucinating and has been in restraints and Psychiatry was asked to  assess that. The patient really is not an appropriate historian, seems  to have some understanding about the fact that he is in the hospital,  but cannot be specific about it and he is not particularly helpful in  terms of his history. The patient comes to the hospital on Ritalin 10  mg twice a day at least from the notes from the [facility]  outpatient although here he is on 10 mg four times a day. He is also on  Prozac 60 mg a day and in the past he has been on as much as 120 mg a  day. He takes trazodone 100 mg at bedtime and here because of the  delirium/psychosis he has been started on Zyprexa 2.5 mg a day although  it looks like during the hospital stay he has received Geodon injection  at least on one occasion. PAST PSYCHIATRIC HISTORY:  I did speak to his wife to get more  background information and she reports the patient has struggled with  depression off and on his whole life.   It should be noted that the staff  has indicated that his wife also has a history of dementia and so they  not also be a good historian. She is unable to say what other  medications he may have been on in the past and whether or not he has  had ECT, but she does not believe so. He has never been hospitalized  psychiatrically and she reports that he does not have any history of  suicide attempts. FAMILY PSYCHIATRIC HISTORY:  Significant for his father who shot and  killed his mother and then shot and killed himself when the patient was  a freshman in college. It is unclear to me how that impacted the  patient. SOCIAL HISTORY:  The patient lives at home with his wife. They report  that money is not an issue and he has no issues with elder abuse. He  has no alcohol or drug history and no violence. REVIEW OF SYSTEMS:  A 10-system review was completed, but because of the  patient's dementia/delirium, I do not really believe he is an  appropriate self historian. PHYSICAL EXAMINATION:  VITAL SIGNS:  His temperature was 98.9, his pulse was 78 and his  respirations were 18 and his blood pressure was 142/85. GENERAL APPEARANCE:  The patient appeared to be a man of his stated age  and was in no acute distress. NEUROMUSCULAR EXAM:  The patient appeared to be of normal muscular  strength and tone throughout. His gait, the patient was in soft  restraints and was not ambulated. MENTAL STATUS EXAM:  The patient presents with visual hallucinations,  but no paranoia. He does not present with anything that would be  consistent with tardive dyskinesia. His mood is appropriate. He does  not report feeling particularly depressed although does not like being  in the hospital _____ being at home. His affect is restricted, but not  tearful. He denies any thoughts of wanting to hurt himself or anybody  else. He is alert, he is only oriented times x2.   His speech for the  most part is spontaneous and goal directed although there is some  tangentiality. His memory testing was 2/3 objects immediately and only  1/3 objects with some reminders after about 10-minute. His answers to  judgment questions were problematic. His attention span is only fair. His general fund of knowledge is adequate. His language was without any  kind of aphasia. Insight and judgment limited. DIAGNOSES:  AXIS I:  1. Delirium. 2.  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 3.  Major depression. 4.  ADHD by history. AXIS II:  No diagnosis. AXIS III:  1. COVID positive. 2.  Atrial fibrillation. 3.  Hypertension. AXIS IV:  Severe. AXIS V:  Current GAF 40, highest in the past year 48. RECOMMENDATIONS:  1. At this point given the delirium and the fact that he is  hallucinating, I am not clear that the Ritalin is a very good idea. Even at 68years old, in the absence of delirium, I think that is a  pretty aggressive dose and certainly could worsen either his agitation  and certainly could worsen the psychosis that is occurring as part of  his delirium. I am going to discontinue that immediately and there  should not be any concern with withdrawal.  2.  I am going to increase the Zyprexa from 2.5 mg to 5 mg at bedtime,  but because I am concerned about possible lethargy and I am going to  reduce the trazodone from 100 back to 50 mg at bedtime. 3.  We will continue the Prozac at 60 mg and he can continue to follow  up as an outpatient. I do not see anything here that would argue for a  General Psychiatry admission, but I will continue to follow him and see  how he does on the _____ changes. Please call us if there are any  questions or concerns with the patient. 4.  More than 70 minutes was spent on the consultation, more than half  of which was in direct patient care and included care coordination and  the treatment plan.         Susan Lopez MD    D: 01/10/2022 11:10:59       T: 01/10/2022 11:16:00     [email or web address]  Job#: [id number]     Doc#: [id number]    CC:

## 2022-01-10 NOTE — CONSULTS
Note dictated. Spoke to wife and staff. Dx:  Delirium         Major Depression         ADHD by hx    Rec:  1). Given that he is hallucinating and I think the dose at 40 mgs is fairly aggressive for a 68year old, I am going to discontinue that. No issue with withdrawal.  I also think it could be impacting his agitation. I am going to increase the zyprexa and lower the trazodone some out of concern for possible lethargy or increased fall risk. I do not think he will need geropsychiatry admission. I will follow.       Cortez Alamo MD

## 2022-01-10 NOTE — CONSULTS
Message of consult sent on [place] to Dr. Gale Schilder @ [phone] on 01/10/22.   RN aware   Ren Johns    Consult placed    Who:Dr. Gale Schilder (Psychiatry)  Date:1/10/2022,  Lisa Tyson AM        Electronically signed by Ren Johns on 1/10/2022 at 8:07 AM

## 2022-01-10 NOTE — CARE COORDINATION
Chart reviewed by CM. Triggered by LOS - day # 4. Patient being followed by internal medicine. Current discharge plan is home with spouse. Disposition pending being medically ready - psychiatry changing pt medications today. Of note: pt is also restrained at this time and psych provider is recommending geropsychiatry admission. 1:48 PM   Referral called to [facility], to review for potential admission. However, pt is covid positive, and this may be a barrier in patient going to facility. CM called to update spouse on suggested plan of care and spouse is agreeable for geripsych admission. No preference for agency/location. CM team will continue to follow.

## 2022-01-10 NOTE — PROGRESS NOTES
Hospitalist Progress Note      PCP: Sam Mccarthy    Date of Admission: 1/6/2022    Chief Complaint:   Fatigue     Hospital Course:    68 y. o. male, with PMH of HTN, HLD and dementia, who presented to [facility] with fatigue. The patient is a poor historian d/t dementia. History obtained from the patient and review of EMR.  The patient was brought into the emergency room due to being more fatigued over the last few days and taking 6-hour naps which is unusual for him. August Gallo was found to have a UTI.  He was going to be prescribed antibiotics and sent home, however upon discharge the patient's heart rate was noticed to be elevated at 140.  At that time a twelve-lead EKG was obtained and the patient was found to be in atrial fibrillation.  This is a new onset atrial fibrillation, therefore the patient will be admitted for further evaluation and treatment. August Gallo was given a 10 mg IV bolus of diltiazem followed by a diltiazem drip.     Subjective:   Patient seen lying in bed resting comfortably. Patient is only oriented to himself at this time. Has no complaints at this time. Denies chest pain, shortness of breath, N/V/D.  Currently in restraints       Medications:  Reviewed    Infusion Medications    sodium chloride      sodium chloride 75 mL/hr at 01/10/22 0849     Scheduled Medications    OLANZapine  2.5 mg Oral Nightly    enoxaparin  1 mg/kg SubCUTAneous Q12H    cefTRIAXone (ROCEPHIN) IV  1,000 mg IntraVENous Q24H    aspirin  81 mg Oral Daily    atenolol  50 mg Oral Daily    atorvastatin  40 mg Oral Nightly    FLUoxetine  60 mg Oral Daily    methylphenidate  10 mg Oral 4x daily    traZODone  100 mg Oral Nightly    sodium chloride flush  5-40 mL IntraVENous 2 times per day     PRN Meds: sodium chloride flush, sodium chloride, ondansetron **OR** ondansetron, polyethylene glycol, acetaminophen **OR** acetaminophen, perflutren lipid microspheres      Intake/Output Summary (Last 24 hours) at 1/10/2022 7915  Last data filed at 1/9/2022 2032  Gross per 24 hour   Intake 300 ml   Output 150 ml   Net 150 ml       Physical Exam Performed:    BP (!) 146/77   Pulse 55   Temp 97.9 °F (36.6 °C) (Oral)   Resp 18   Ht 5' 11\" (1.803 m)   Wt 200 lb (90.7 kg)   SpO2 97%   BMI 27.89 kg/m²     General appearance: No apparent distress, appears stated age and cooperative. HEENT: Pupils equal, round, and reactive to light. Conjunctivae/corneas clear. Neck: Supple, with full range of motion. No jugular venous distention. Trachea midline. Respiratory:  Normal respiratory effort. Clear to auscultation, bilaterally without Rales/Wheezes/Rhonchi. Cardiovascular: Regular rate and rhythm with normal S1/S2 without murmurs, rubs or gallops. Abdomen: Soft, non-tender, non-distended with normal bowel sounds. Musculoskeletal: No clubbing, cyanosis or edema bilaterally. Full range of motion without deformity. Skin: Skin color, texture, turgor normal.  No rashes or lesions. Neurologic:  Neurovascularly intact without any focal sensory/motor deficits. grossly non-focal.  Psychiatric: Alert and oriented, thought content appropriate, normal insight  Capillary Refill: Brisk,3 seconds, normal   Peripheral Pulses: +2 palpable, equal bilaterally       Labs:   Recent Labs     01/08/22  1313 01/09/22  1054   WBC 3.4* 3.2*   HGB 13.8 14.8   HCT 41.0 42.7    182     Recent Labs     01/08/22  1313 01/09/22  1054   * 136   K 4.0 3.7    101   CO2 21 23   BUN 20 16   CREATININE 1.2 1.1   CALCIUM 8.1* 8.6     No results for input(s): AST, ALT, BILIDIR, BILITOT, ALKPHOS in the last 72 hours. No results for input(s): INR in the last 72 hours. No results for input(s): Leandro Min in the last 72 hours.     Urinalysis:      Lab Results   Component Value Date    NITRU POSITIVE 01/06/2022    WBCUA >100 01/06/2022    BACTERIA 4+ 01/06/2022    RBCUA 5-10 01/06/2022    BLOODU MODERATE 01/06/2022    SPECGRAV >=1.030 01/06/2022 GLUCOSEU Negative 01/06/2022       Radiology:  CT HEAD WO CONTRAST   Final Result      1. No evidence of acute intracranial process. 2.  Findings of presumed small vessel ischemic deep white matter disease. 3.  Small old lacunar infarct in the lateral left basal ganglia/subinsular   area. 4.  Prominence of the sulci and/or CSF spaces suggests a degree of cerebral   atrophy. 5.  Maxillary, ethmoid, inferior frontal and right sphenoid chronic sinusitis. XR CHEST PORTABLE   Final Result   No acute cardiopulmonary disease. Suboptimal rotated portable study obtained at low lung volumes. When the   patient is able, a follow-up inspiratory PA and lateral examination the chest   is recommended. Assessment/Plan:    Active Hospital Problems    Diagnosis     New onset atrial fibrillation ([facility] [address] [I48.91]     Acute cystitis [N30.00]     Essential hypertension [I10]     Hyperlipidemia [E78.5]        New onset of atrial fibrillation.  -Cardiology consulted patient return to normal sinus rhythm Cardizem drip has been discontinued, 2D echo with normal EF.  -On therapeutic Lovenox.  -Patient to discharge home on event monitor per cardiology. COVID-19 tested positive   -Positive on 1/7/2022 difficult to determine symptoms onset as he is a poor historian continue with supportive care.  -He is vaccinated with Materna x2 but no record of booster. UTI, E. coli sensitivite   -Will complete course of Rocephin today. Acute kidney injury 2/2 poor intake, resolved   -Creatinine 1.1 yesterday      Dementia   -supportive care. -Psych consulted for continue use of restraints, discontinued Ritalin, increased Zyprexa to 5 mg, decreased Trazodone to 50 mg nightly   -PT/OT evaluations    Hypertension on atenolol.   Hyperlipidemia on statin. Anemia stable no evidence of bleeding. DVT Prophylaxis: Lovenox   Diet: ADULT DIET;  Regular  Code Status: Full Code    PT/OT Eval Status: Recommending 24 hr supervision     Dispo - Plan originally to go home with spouse, Psychiatry recommending geropysch admission, spouse agreeable    Yolanda Jensen

## 2022-01-11 VITALS
SYSTOLIC BLOOD PRESSURE: 151 MMHG | RESPIRATION RATE: 16 BRPM | BODY MASS INDEX: 28 KG/M2 | HEIGHT: 71 IN | OXYGEN SATURATION: 98 % | DIASTOLIC BLOOD PRESSURE: 82 MMHG | HEART RATE: 53 BPM | WEIGHT: 200 LBS | TEMPERATURE: 97.6 F

## 2022-01-11 PROCEDURE — 6370000000 HC RX 637 (ALT 250 FOR IP): Performed by: NURSE PRACTITIONER

## 2022-01-11 PROCEDURE — 6360000002 HC RX W HCPCS: Performed by: INTERNAL MEDICINE

## 2022-01-11 RX ORDER — TRAZODONE HYDROCHLORIDE 50 MG/1
50 TABLET ORAL NIGHTLY
Qty: 30 TABLET | Refills: 0 | Status: SHIPPED | OUTPATIENT
Start: 2022-01-11 | End: 2022-02-10

## 2022-01-11 RX ORDER — OLANZAPINE 5 MG/1
5 TABLET ORAL NIGHTLY
Qty: 30 TABLET | Refills: 3 | Status: SHIPPED | OUTPATIENT
Start: 2022-01-11

## 2022-01-11 RX ADMIN — FLUOXETINE 60 MG: 20 CAPSULE ORAL at 09:32

## 2022-01-11 RX ADMIN — ENOXAPARIN SODIUM 90 MG: 100 INJECTION SUBCUTANEOUS at 09:32

## 2022-01-11 RX ADMIN — ATENOLOL 50 MG: 25 TABLET ORAL at 09:32

## 2022-01-11 RX ADMIN — ASPIRIN 81 MG: 81 TABLET, COATED ORAL at 09:32

## 2022-01-11 ASSESSMENT — PAIN SCALES - GENERAL: PAINLEVEL_OUTOF10: 0

## 2022-01-11 NOTE — CONSULTS
PALLIATIVE MEDICINE CONSULTATION     Patient name:Brent Jefferson   [place]:[id number]    :1945  Room/Bed:0353/0353-01   LOS: 5 days         Date of consult:2022    Consult Information  Palliative Medicine Consult performed by: ADRIANA Juarez CNP      Inpatient consult to Palliative Care  Consult performed by: ADRIANA Juarez CNP  Consult ordered by: Shaniqua De La Paz MD  Reason for consult: goals of care, code status    Inpatient consult to Palliative Care  Consult performed by: ADRIANA Juarez CNP  Consult ordered by: Shaniqua De La Paz MD        Number of admissions past 12 months: 0      ASSESSMENT/RECOMMENDATIONS     68 y.o. male with HTN, HLD and dementia admitted with new onset Afib, UTI, SUBHASH and COVID 23. Symptom Management:  1. New onset Afib - on lovenox, will be d/c'd home with an event moitor per cardiology   2. Dementia/hallucinations - psych consulted and is changing medications/evaluating. Will likely d/c to [facility] for further evaluation/management   3. Goals of Care - Confirmed with family that patient is a full code. Outpatient palliacare referral made    Patient/Family Goals of Care :    21    Spoke with Jennifer Chapel (spouse) and Drew (Son) separately by phone. The goal is for patient to go back home to live with wife and son after hospital (or [facility]) discharge. Son lives with parents and helps care for them, although he says they are largely independant. Wife states she and patient often banter back and forth together and she is anxious for him to be home. Had discussion about code status with wife and son and both indicate patient would want to be a full code. Disposition/Discharge Plan:   D/c to [facility] or home with wife and son. An outpatient [facility] referral was ordered for post-discharge to followup with the patient once they return home.     Advance Directives:  · Surrogate Decision Maker: Wife, Jennifer Chapel and Son, Grayson  · Code status:  Full Code    Case discussed with: patient, wife, son, Chepe [place], [place]  Thank you for allowing us to participate in the care of this patient. HISTORY     CC: Dementia  HPI: The patient is a 68 y.o. male with HTN, HLD, dementia, new onset afib and COVID 23    Palliative Medicine SymptomScreening/ROS:  Review of Systems -   History obtained from unobtainable from patient due to mental status      Patient unable to complete full ROS due to current cognitive status. Information that is obtained from nursing and chart.      Pain: denies    Home med list and hospital medications reviewed in chart as of 1/11/2022     EXAM     Vitals:    01/11/22 0925   BP: (!) 161/79   Pulse: 57   Resp: 16   Temp: 97.6 °F (36.4 °C)   SpO2: 99%       Physical Examination:   General appearance - acyanotic, in no respiratory distress  Mental status - disoriented to place and time, drowsy, but follows commands  Mouth - mucous membranes moist, pharynx normal without lesions  Lymphatics - no palpable lymphadenopathy  Abdomen - soft, nondistended, no masses or organomegaly, mild tenderness noted in RUQ  Musculoskeletal - no muscular tenderness noted   Extremities - intact peripheral pulses, feet normal, good pulses, normal color, temperature and sensation, upper extremities in soft restraints  Skin - scattered bruising on extremities        Current labs in the epic chart reviewed as of 1/11/2022   Review of previous notes, admits, labs, radiology and testing relevant to this consult done in this chart today 1/11/2022      Total time: 75 minutes  >50% of time spent counseling patient at bedside or POA/family member if applicable , reviewing information and discussing care, coordinating with care team  Signed By: Electronically signed by ADRIANA Gilman CNP on 1/11/2022 at 10:46 AM  Palliative Medicine   [phone]    January 11, 2022

## 2022-01-11 NOTE — PLAN OF CARE
Patient was observed pulling at lines and tubing, attempting to get out of bed. Alternatives were unsuccessful at getting patient to remain in bed and to quit pulling on lines and tubing. Soft bilateral wrist and R ankle restraints remain in place at this time for the safety of this patient as well as [employer] video monitoring.

## 2022-01-11 NOTE — CARE COORDINATION
CM misread psychiatry note from yesterday. Provider note indicated pt does NOT need geropsychiatry admission. CM called and updated spouse. Pt will discharge home today without needs. Spouse states she will be here within the hour and send her son to [facility], while she waits in the car. Primary nurse, Galdino Shelley [address], notified.

## 2022-01-12 ENCOUNTER — CARE COORDINATION (OUTPATIENT)
Dept: CASE MANAGEMENT | Age: 77
End: 2022-01-12

## 2022-01-12 NOTE — PLAN OF CARE
Discharge papers discussed with Son. Discussed change in meds and medication regimen. Discussed the importance of safety at home regarding falls and impaired cognition. All questions and concerns answered. Patient was discharged home with son and wife and all belongings in hand.

## 2022-01-12 NOTE — DISCHARGE SUMMARY
Hospital Medicine Discharge Summary    Patient ID: Magen Roberts      Patient's PCP: Jazzy Connolly    Admit Date: 1/6/2022     Discharge Date: 1/11/2022      Admitting Provider: Claudette Erb, MD     Discharge Provider: ISREAL Mckeon     Discharge Diagnoses: Active Hospital Problems    Diagnosis     New onset atrial fibrillation (HCC) [I48.91]     Acute cystitis [N30.00]     Essential hypertension [I10]     Hyperlipidemia [E78.5]        The patient was seen and examined on day of discharge and this discharge summary is in conjunction with any daily progress note from day of discharge. Hospital Course:   68 y. o. male, with PMH of HTN, HLD and dementia, who presented to [facility] with fatigue. The patient is a poor historian d/t dementia.  History obtained from the patient and review of EMR.  The patient was brought into the emergency room due to being more fatigued over the last few days and taking 6-hour naps which is unusual for him. Haley Guardado was found to have a UTI.  He was going to be prescribed antibiotics and sent home, however upon discharge the patient's heart rate was noticed to be elevated at 140.  At that time a twelve-lead EKG was obtained and the patient was found to be in atrial fibrillation.  This is a new onset atrial fibrillation, therefore the patient will be admitted for further evaluation and treatment. Haley Guardado was given a 10 mg IV bolus of diltiazem followed by a diltiazem drip.     New onset of atrial fibrillation.  -Cardiology consulted patient return to normal sinus rhythm Cardizem drip has been discontinued, 2D echo with normal EF.     COVID-19 tested positive   -Positive on 1/7/2022 difficult to determine symptoms onset as he is a poor historian  -He is vaccinated with Materna x2 but no record of booster.     UTI, E. coli sensitivite   -Completed course of Rocephin     Acute kidney injury 2/2 poor intake, resolved       Dementia   -supportive care.    -Psych consulted for continue use of restraints during admission, discontinued Ritalin, increased Zyprexa to 5 mg, decreased Trazodone to 50 mg nightly   -Patient will most likely benefit from long-term placement in the future if he becomes agitated and combative at home, or can no longer be appropriately cared for by family.     Hypertension    -continue on atenolol.     Hyperlipidemia   -continue on statin. Anemia    -stable      Physical Exam Performed:     BP (!) 151/82   Pulse 53   Temp 97.6 °F (36.4 °C) (Oral)   Resp 16   Ht 5' 11\" (1.803 m)   Wt 200 lb (90.7 kg)   SpO2 98%   BMI 27.89 kg/m²       General appearance:  No apparent distress, appears stated age and cooperative. HEENT:  Normal cephalic, atraumatic without obvious deformity. Pupils equal, round, and reactive to light. Extra ocular muscles intact. Conjunctivae/corneas clear. Neck: Supple, with full range of motion. No jugular venous distention. Trachea midline. Respiratory:  Normal respiratory effort. Clear to auscultation, bilaterally without Rales/Wheezes/Rhonchi. Cardiovascular:  Regular rate and rhythm with normal S1/S2 without murmurs, rubs or gallops. Abdomen: Soft, non-tender, non-distended with normal bowel sounds. Musculoskeletal:  No clubbing, cyanosis or edema bilaterally. Full range of motion without deformity. Skin: Skin color, texture, turgor normal.  No rashes or lesions. Neurologic:  Neurovascularly intact without any focal sensory/motor deficits. grossly non-focal.  Psychiatric: Alert and oriented only to self, poor insight, thought content scattered  Capillary Refill: Brisk,< 3 seconds   Peripheral Pulses: +2 palpable, equal bilaterally       Labs:  For convenience and continuity at follow-up the following most recent labs are provided:      CBC:    Lab Results   Component Value Date    WBC 3.2 01/09/2022    HGB 14.8 01/09/2022    HCT 42.7 01/09/2022     01/09/2022       Renal:    Lab Results   Component Value Date     01/09/2022    K 3.7 01/09/2022    K 4.6 09/04/2018     01/09/2022    CO2 23 01/09/2022    BUN 16 01/09/2022    CREATININE 1.1 01/09/2022    CALCIUM 8.6 01/09/2022         Significant Diagnostic Studies    Radiology:   CT HEAD WO CONTRAST   Final Result      1. No evidence of acute intracranial process. 2.  Findings of presumed small vessel ischemic deep white matter disease. 3.  Small old lacunar infarct in the lateral left basal ganglia/subinsular   area. 4.  Prominence of the sulci and/or CSF spaces suggests a degree of cerebral   atrophy. 5.  Maxillary, ethmoid, inferior frontal and right sphenoid chronic sinusitis. XR CHEST PORTABLE   Final Result   No acute cardiopulmonary disease. Suboptimal rotated portable study obtained at low lung volumes. When the   patient is able, a follow-up inspiratory PA and lateral examination the chest   is recommended.                 Consults:     IP CONSULT TO CARDIOLOGY  IP CONSULT TO PALLIATIVE CARE  IP CONSULT TO PALLIATIVE CARE  IP CONSULT TO PSYCHIATRY    Disposition: Home    Condition at Discharge: Stable    Discharge Instructions/Follow-up:    Follow-up PCP in 1 to 2 weeks  Follow-up with cardiologist in 1 to 2 weeks    Code Status:  Prior full    Activity: activity as tolerated    Diet: regular diet      Discharge Medications:     Discharge Medication List as of 1/11/2022  1:38 PM           Details   OLANZapine (ZYPREXA) 5 MG tablet Take 1 tablet by mouth nightly, Disp-30 tablet, R-3Normal      cefUROXime (CEFTIN) 250 MG tablet Take 1 tablet by mouth 2 times daily for 7 days, Disp-14 tablet, R-0Normal              Details   traZODone (DESYREL) 50 MG tablet Take 1 tablet by mouth nightly, Disp-30 tablet, R-0Normal              Details   ATENOLOL PO Take 50 mg by mouth dailyHistorical Med      rivastigmine (EXELON) 6 MG capsule Take 1 capsule by mouth 2 times daily Historical Med      atorvastatin (LIPITOR) 40 MG tablet daily Historical Med      FLUoxetine (PROZAC) 20 MG capsule Take 60 mg by mouth dailyHistorical Med      aspirin 81 MG tablet Take 81 mg by mouth daily. multivitamin (THERAGRAN) per tablet Take 1 tablet by mouth daily. Ascorbic Acid (VITAMIN C) 250 MG tablet Take 500 mg by mouth daily. Psyllium (METAMUCIL PO) Take  by mouth. Omega-3 Fatty Acids (FISH OIL) 1000 MG CAPS Take 2,000 mg by mouth 2 times daily. Misc Natural Products (OSTEO BI-FLEX JOINT SHIELD PO) Take  by mouth. Time Spent on discharge is more than 30 minutes in the examination, evaluation, counseling and review of medications and discharge plan. Signed:    ISREAL Alvarenga   1/12/2022      Thank you Sherman Mccarthy for the opportunity to be involved in this patient's care. If you have any questions or concerns please feel free to contact me at [phone].

## 2022-01-12 NOTE — CARE COORDINATION
instructions available to patient? Yes. Reviewed appropriate site of care based on symptoms and resources available to patient including: PCP and Specialist. The family agrees to contact the PCP office for questions related to their healthcare. Medication reconciliation was performed with family, who verbalizes understanding of administration of home medications. Advised obtaining a 90-day supply of all daily and as-needed medications. Covid Risk Education     Educated patient about risk for severe COVID-19 due to risk factors according to CDC guidelines. CTN reviewed discharge instructions, medical action plan and red flag symptoms with the patient who verbalized understanding. Discussed COVID vaccination status: No. Education provided on COVID-19 vaccination as appropriate. Discussed exposure protocols and quarantine with CDC Guidelines. Patient was given an opportunity to verbalize any questions and concerns and agrees to contact CTN or health care provider for questions related to their healthcare. Was patient discharged with a pulse oximeter? No Discussed and confirmed pulse oximeter discharge instructions and when to notify provider or seek emergency care. Patient answered call and verified . Patient pleasant and agreeable to transition call. Patient request that CTN speak to wife, Claytonangela and introductions completed. Patient doing well and happy to be back home. Reviewed and educated family on any new and changed medications related to discharge diagnosis. Jd was concerned about new medication prescribed and placed call to psychiatrist prior to starting new medication. Otherwise, Jd confirms that patient is taking all prescription medication as directed. Jd is to reach out to PCP today as well and schedule follow up hospitalization visit. Denies any acute needs at present time. Agreeable to f/u calls.   Educated on the use of urgent care or physicians 24 hr access line if assistance is needed after hours. CTN provided contact information. Plan for follow-up call in 5-7 days based on severity of symptoms and risk factors.         Care Transitions 24 Hour Call    Do you have any ongoing symptoms?: No  Do you have a copy of your discharge instructions?: Yes  Do you have all of your prescriptions and are they filled?: Yes  Have you been contacted by a [employer] [address]?: No  Have you scheduled your follow up appointment?: Yes  How are you going to get to your appointment?: Car - family or friend to transport  Were you discharged with any Home Care or Post Acute Services: No  Do you feel like you have everything you need to keep you well at home?: Yes  Care Transitions Interventions         Follow Up  Future Appointments   Date Time Provider Nancy Mason   2/7/2022  2:30 PM ADRIANA Quezada - FREYA Holbrook, SHAYLA

## 2022-01-19 ENCOUNTER — CARE COORDINATION (OUTPATIENT)
Dept: CASE MANAGEMENT | Age: 77
End: 2022-01-19

## 2022-01-19 NOTE — CARE COORDINATION
Li[facility] Transitions Follow Up Call    2022    Patient: Ilsa Roy  Patient : 1945   MRN: [id number]  Reason for Admission: covid  Discharge Date: 22 RARS: Readmission Risk Score: 10.8 ( )         Spoke with: na  Attempted to reach patient via phone for transition call. VM left stating purpose of call along with my contact information requesting a return call. Care Transitions Subsequent and Final Call    Subsequent and Final Calls  Care Transitions Interventions  Other Interventions:            Follow Up  Future Appointments   Date Time Provider Nancy Mason   2022  2:30 PM ADRIANA Campa - CNP TIDAL PETROLEUM oNoise [facility]       Gio Lee RN

## 2022-01-26 ENCOUNTER — CARE COORDINATION (OUTPATIENT)
Dept: CASE MANAGEMENT | Age: 77
End: 2022-01-26

## 2022-01-26 NOTE — CARE COORDINATION
Follow Up Call    Challenges to be reviewed by the provider   Additional needs identified to be addressed with provider: No  none                 Encounter was not routed to provider for escalation. Method of communication with provider:  none. Contacted the family by telephone to follow up after hospital visit. Status: not changed  Interventions to address identified needs: Obtained and reviewed discharge summary and/or continuity of care documents    [facility] follow up appointment(s):   Future Appointments   Date Time Provider Nancy Mason   2022  2:30 PM ADRIANA Renae - FREYA CELAYA     Non-[facility] follow up appointment(s): na   Follow up appointment completed? No.  Patient's wife answered call and verified . Patient doing well and taking all medication as directed. Wife contacted Dr Elisabet Leonard and follow up visit scheduled. No further transition support needed and episode ended. Provided contact information for future needs. No further follow-up call indicated based on severity of symptoms and risk factors.   Mary Hopkins RN

## 2023-04-19 ENCOUNTER — HOSPITAL ENCOUNTER (INPATIENT)
Age: 78
LOS: 6 days | Discharge: NURSING, REHAB OR LONG-TERM CARE | DRG: 481 | End: 2023-04-25
Attending: EMERGENCY MEDICINE | Admitting: INTERNAL MEDICINE
Payer: MEDICARE

## 2023-04-19 ENCOUNTER — APPOINTMENT (OUTPATIENT)
Dept: GENERAL RADIOLOGY | Age: 78
DRG: 481 | End: 2023-04-19
Payer: MEDICARE

## 2023-04-19 DIAGNOSIS — D72.829 LEUKOCYTOSIS, UNSPECIFIED TYPE: ICD-10-CM

## 2023-04-19 DIAGNOSIS — R94.31 PROLONGED Q-T INTERVAL ON ECG: ICD-10-CM

## 2023-04-19 DIAGNOSIS — S72.22XA CLOSED DISPLACED SUBTROCHANTERIC FRACTURE OF LEFT FEMUR, INITIAL ENCOUNTER (HCC): Primary | ICD-10-CM
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
## 2023-04-19 PROBLEM — S72.002A CLOSED LEFT HIP FRACTURE, INITIAL ENCOUNTER (HCC): Status: ACTIVE | Noted: 2023-04-19

## 2023-04-19 PROBLEM — S72.352A CLOSED DISPLACED COMMINUTED FRACTURE OF SHAFT OF LEFT FEMUR (HCC): Status: ACTIVE | Noted: 2023-04-19

## 2023-04-19 LAB
ABO + RH BLD: NORMAL
ALBUMIN SERPL-MCNC: 4.1 G/DL (ref 3.4–5)
ALBUMIN/GLOB SERPL: 1.7 {RATIO} (ref 1.1–2.2)
ALP SERPL-CCNC: 101 U/L (ref 40–129)
ALT SERPL-CCNC: 18 U/L (ref 10–40)
ANION GAP SERPL CALCULATED.3IONS-SCNC: 15 MMOL/L (ref 3–16)
APTT BLD: 24.3 SEC (ref 22.7–35.9)
AST SERPL-CCNC: 27 U/L (ref 15–37)
BASOPHILS # BLD: 0.2 K/UL (ref 0–0.2)
BASOPHILS NFR BLD: 0.8 %
BILIRUB SERPL-MCNC: 0.6 MG/DL (ref 0–1)
BILIRUB UR QL STRIP.AUTO: NEGATIVE
BLD GP AB SCN SERPL QL: NORMAL
BUN SERPL-MCNC: 23 MG/DL (ref 7–20)
CALCIUM SERPL-MCNC: 9.2 MG/DL (ref 8.3–10.6)
CHLORIDE SERPL-SCNC: 100 MMOL/L (ref 99–110)
CLARITY UR: CLEAR
CO2 SERPL-SCNC: 20 MMOL/L (ref 21–32)
COLOR UR: YELLOW
CREAT SERPL-MCNC: 1.1 MG/DL (ref 0.8–1.3)
DEPRECATED RDW RBC AUTO: 13.9 % (ref 12.4–15.4)
EKG ATRIAL RATE: 74 BPM
EKG DIAGNOSIS: NORMAL
EKG P AXIS: 72 DEGREES
EKG P-R INTERVAL: 136 MS
EKG Q-T INTERVAL: 466 MS
EKG QRS DURATION: 72 MS
EKG QTC CALCULATION (BAZETT): 517 MS
EKG R AXIS: 69 DEGREES
EKG T AXIS: 79 DEGREES
EKG VENTRICULAR RATE: 74 BPM
EOSINOPHIL # BLD: 0 K/UL (ref 0–0.6)
EOSINOPHIL NFR BLD: 0.1 %
GFR SERPLBLD CREATININE-BSD FMLA CKD-EPI: >60 ML/MIN/{1.73_M2}
GLUCOSE SERPL-MCNC: 128 MG/DL (ref 70–99)
GLUCOSE UR STRIP.AUTO-MCNC: NEGATIVE MG/DL
HCT VFR BLD AUTO: 37.8 % (ref 40.5–52.5)
HGB BLD-MCNC: 12.5 G/DL (ref 13.5–17.5)
HGB UR QL STRIP.AUTO: NEGATIVE
INR PPP: 1 (ref 0.84–1.16)
KETONES UR STRIP.AUTO-MCNC: NEGATIVE MG/DL
LEUKOCYTE ESTERASE UR QL STRIP.AUTO: NEGATIVE
LYMPHOCYTES # BLD: 0.4 K/UL (ref 1–5.1)
LYMPHOCYTES NFR BLD: 2.2 %
MCH RBC QN AUTO: 29.6 PG (ref 26–34)
MCHC RBC AUTO-ENTMCNC: 33.2 G/DL (ref 31–36)
MCV RBC AUTO: 89.2 FL (ref 80–100)
MONOCYTES # BLD: 1 K/UL (ref 0–1.3)
MONOCYTES NFR BLD: 5.5 %
NEUTROPHILS # BLD: 16.4 K/UL (ref 1.7–7.7)
NEUTROPHILS NFR BLD: 91.4 %
NITRITE UR QL STRIP.AUTO: NEGATIVE
PH UR STRIP.AUTO: 5.5 [PH] (ref 5–8)
PLATELET # BLD AUTO: 294 K/UL (ref 135–450)
PMV BLD AUTO: 8.5 FL (ref 5–10.5)
POTASSIUM SERPL-SCNC: 4.1 MMOL/L (ref 3.5–5.1)
PROT SERPL-MCNC: 6.5 G/DL (ref 6.4–8.2)
PROT UR STRIP.AUTO-MCNC: NEGATIVE MG/DL
PROTHROMBIN TIME: 13.2 SEC (ref 11.5–14.8)
RBC # BLD AUTO: 4.24 M/UL (ref 4.2–5.9)
SODIUM SERPL-SCNC: 135 MMOL/L (ref 136–145)
SP GR UR STRIP.AUTO: >=1.03 (ref 1–1.03)
UA COMPLETE W REFLEX CULTURE PNL UR: NORMAL
UA DIPSTICK W REFLEX MICRO PNL UR: NORMAL
URN SPEC COLLECT METH UR: NORMAL
UROBILINOGEN UR STRIP-ACNC: 0.2 E.U./DL
WBC # BLD AUTO: 18 K/UL (ref 4–11)

## 2023-04-19 PROCEDURE — 85730 THROMBOPLASTIN TIME PARTIAL: CPT

## 2023-04-19 PROCEDURE — 85610 PROTHROMBIN TIME: CPT

## 2023-04-19 PROCEDURE — 1200000000 HC SEMI PRIVATE

## 2023-04-19 PROCEDURE — 6370000000 HC RX 637 (ALT 250 FOR IP): Performed by: INTERNAL MEDICINE

## 2023-04-19 PROCEDURE — 81003 URINALYSIS AUTO W/O SCOPE: CPT

## 2023-04-19 PROCEDURE — 72170 X-RAY EXAM OF PELVIS: CPT

## 2023-04-19 PROCEDURE — 93005 ELECTROCARDIOGRAM TRACING: CPT | Performed by: EMERGENCY MEDICINE

## 2023-04-19 PROCEDURE — 99285 EMERGENCY DEPT VISIT HI MDM: CPT

## 2023-04-19 PROCEDURE — 71045 X-RAY EXAM CHEST 1 VIEW: CPT

## 2023-04-19 PROCEDURE — 86901 BLOOD TYPING SEROLOGIC RH(D): CPT

## 2023-04-19 PROCEDURE — 96376 TX/PRO/DX INJ SAME DRUG ADON: CPT

## 2023-04-19 PROCEDURE — 6360000002 HC RX W HCPCS: Performed by: INTERNAL MEDICINE

## 2023-04-19 PROCEDURE — 86900 BLOOD TYPING SEROLOGIC ABO: CPT

## 2023-04-19 PROCEDURE — 85025 COMPLETE CBC W/AUTO DIFF WBC: CPT

## 2023-04-19 PROCEDURE — 6360000002 HC RX W HCPCS: Performed by: EMERGENCY MEDICINE

## 2023-04-19 PROCEDURE — 96374 THER/PROPH/DIAG INJ IV PUSH: CPT

## 2023-04-19 PROCEDURE — 86850 RBC ANTIBODY SCREEN: CPT

## 2023-04-19 PROCEDURE — 2580000003 HC RX 258: Performed by: INTERNAL MEDICINE

## 2023-04-19 PROCEDURE — 80053 COMPREHEN METABOLIC PANEL: CPT

## 2023-04-19 PROCEDURE — 99222 1ST HOSP IP/OBS MODERATE 55: CPT | Performed by: ORTHOPAEDIC SURGERY

## 2023-04-19 PROCEDURE — 93010 ELECTROCARDIOGRAM REPORT: CPT | Performed by: INTERNAL MEDICINE

## 2023-04-19 PROCEDURE — 73552 X-RAY EXAM OF FEMUR 2/>: CPT

## 2023-04-19 RX ORDER — OLANZAPINE 5 MG/1
5 TABLET ORAL NIGHTLY
Status: DISCONTINUED | OUTPATIENT
Start: 2023-04-19 | End: 2023-04-25 | Stop reason: HOSPADM

## 2023-04-19 RX ORDER — MORPHINE SULFATE 4 MG/ML
4 INJECTION, SOLUTION INTRAMUSCULAR; INTRAVENOUS ONCE
Status: COMPLETED | OUTPATIENT
Start: 2023-04-19 | End: 2023-04-19

## 2023-04-19 RX ORDER — ONDANSETRON 4 MG/1
4 TABLET, ORALLY DISINTEGRATING ORAL EVERY 8 HOURS PRN
Status: DISCONTINUED | OUTPATIENT
Start: 2023-04-19 | End: 2023-04-19

## 2023-04-19 RX ORDER — RIVASTIGMINE TARTRATE 1.5 MG/1
6 CAPSULE ORAL 2 TIMES DAILY
Status: DISCONTINUED | OUTPATIENT
Start: 2023-04-19 | End: 2023-04-25 | Stop reason: HOSPADM

## 2023-04-19 RX ORDER — MORPHINE SULFATE 2 MG/ML
2 INJECTION, SOLUTION INTRAMUSCULAR; INTRAVENOUS EVERY 4 HOURS PRN
Status: DISCONTINUED | OUTPATIENT
Start: 2023-04-19 | End: 2023-04-21

## 2023-04-19 RX ORDER — SODIUM CHLORIDE 9 MG/ML
INJECTION, SOLUTION INTRAVENOUS PRN
Status: DISCONTINUED | OUTPATIENT
Start: 2023-04-19 | End: 2023-04-20 | Stop reason: SDUPTHER

## 2023-04-19 RX ORDER — PROCHLORPERAZINE EDISYLATE 5 MG/ML
10 INJECTION INTRAMUSCULAR; INTRAVENOUS EVERY 6 HOURS PRN
Status: DISCONTINUED | OUTPATIENT
Start: 2023-04-19 | End: 2023-04-25 | Stop reason: HOSPADM

## 2023-04-19 RX ORDER — FLUOXETINE HYDROCHLORIDE 20 MG/1
60 CAPSULE ORAL DAILY
Status: DISCONTINUED | OUTPATIENT
Start: 2023-04-20 | End: 2023-04-25 | Stop reason: HOSPADM

## 2023-04-19 RX ORDER — SODIUM CHLORIDE 0.9 % (FLUSH) 0.9 %
5-40 SYRINGE (ML) INJECTION EVERY 12 HOURS SCHEDULED
Status: DISCONTINUED | OUTPATIENT
Start: 2023-04-19 | End: 2023-04-25 | Stop reason: HOSPADM

## 2023-04-19 RX ORDER — ATENOLOL 25 MG/1
50 TABLET ORAL DAILY
Status: DISCONTINUED | OUTPATIENT
Start: 2023-04-20 | End: 2023-04-25 | Stop reason: HOSPADM

## 2023-04-19 RX ORDER — MORPHINE SULFATE 4 MG/ML
4 INJECTION, SOLUTION INTRAMUSCULAR; INTRAVENOUS EVERY 4 HOURS PRN
Status: DISCONTINUED | OUTPATIENT
Start: 2023-04-19 | End: 2023-04-21

## 2023-04-19 RX ORDER — ATORVASTATIN CALCIUM 40 MG/1
40 TABLET, FILM COATED ORAL DAILY
Status: DISCONTINUED | OUTPATIENT
Start: 2023-04-20 | End: 2023-04-25 | Stop reason: HOSPADM

## 2023-04-19 RX ORDER — ONDANSETRON 2 MG/ML
4 INJECTION INTRAMUSCULAR; INTRAVENOUS EVERY 6 HOURS PRN
Status: DISCONTINUED | OUTPATIENT
Start: 2023-04-19 | End: 2023-04-19

## 2023-04-19 RX ORDER — ENOXAPARIN SODIUM 100 MG/ML
40 INJECTION SUBCUTANEOUS DAILY
Status: DISCONTINUED | OUTPATIENT
Start: 2023-04-20 | End: 2023-04-20 | Stop reason: SDUPTHER

## 2023-04-19 RX ORDER — SODIUM CHLORIDE 0.9 % (FLUSH) 0.9 %
5-40 SYRINGE (ML) INJECTION PRN
Status: DISCONTINUED | OUTPATIENT
Start: 2023-04-19 | End: 2023-04-25 | Stop reason: HOSPADM

## 2023-04-19 RX ORDER — POLYETHYLENE GLYCOL 3350 17 G/17G
17 POWDER, FOR SOLUTION ORAL DAILY PRN
Status: DISCONTINUED | OUTPATIENT
Start: 2023-04-19 | End: 2023-04-21

## 2023-04-19 RX ADMIN — MORPHINE SULFATE 4 MG: 4 INJECTION, SOLUTION INTRAMUSCULAR; INTRAVENOUS at 12:30

## 2023-04-19 RX ADMIN — Medication 10 ML: at 20:14

## 2023-04-19 RX ADMIN — OLANZAPINE 5 MG: 5 TABLET, FILM COATED ORAL at 20:13

## 2023-04-19 RX ADMIN — MORPHINE SULFATE 4 MG: 4 INJECTION, SOLUTION INTRAMUSCULAR; INTRAVENOUS at 10:53

## 2023-04-19 RX ADMIN — MORPHINE SULFATE 4 MG: 4 INJECTION, SOLUTION INTRAMUSCULAR; INTRAVENOUS at 16:29

## 2023-04-19 RX ADMIN — RIVASTIGMINE TARTRATE 6 MG: 1.5 CAPSULE ORAL at 20:13

## 2023-04-19 ASSESSMENT — LIFESTYLE VARIABLES
HOW MANY STANDARD DRINKS CONTAINING ALCOHOL DO YOU HAVE ON A TYPICAL DAY: PATIENT DOES NOT DRINK
HOW OFTEN DO YOU HAVE A DRINK CONTAINING ALCOHOL: NEVER

## 2023-04-19 ASSESSMENT — PAIN SCALES - PAIN ASSESSMENT IN ADVANCED DEMENTIA (PAINAD)
TOTALSCORE: 7
NEGVOCALIZATION: 1
CONSOLABILITY: 1
BODYLANGUAGE: 2
BREATHING: 1
FACIALEXPRESSION: 2

## 2023-04-19 ASSESSMENT — PAIN - FUNCTIONAL ASSESSMENT: PAIN_FUNCTIONAL_ASSESSMENT: WONG-BAKER FACES

## 2023-04-19 ASSESSMENT — PAIN SCALES - WONG BAKER
WONGBAKER_NUMERICALRESPONSE: 6
WONGBAKER_NUMERICALRESPONSE: 4

## 2023-04-20 ENCOUNTER — ANESTHESIA EVENT (OUTPATIENT)
Dept: OPERATING ROOM | Age: 78
End: 2023-04-20
Payer: MEDICARE

## 2023-04-20 ENCOUNTER — APPOINTMENT (OUTPATIENT)
Dept: GENERAL RADIOLOGY | Age: 78
DRG: 481 | End: 2023-04-20
Payer: MEDICARE

## 2023-04-20 ENCOUNTER — ANESTHESIA (OUTPATIENT)
Dept: OPERATING ROOM | Age: 78
End: 2023-04-20
Payer: MEDICARE

## 2023-04-20 LAB
ANION GAP SERPL CALCULATED.3IONS-SCNC: 10 MMOL/L (ref 3–16)
BUN SERPL-MCNC: 26 MG/DL (ref 7–20)
CALCIUM SERPL-MCNC: 8.8 MG/DL (ref 8.3–10.6)
CHLORIDE SERPL-SCNC: 103 MMOL/L (ref 99–110)
CO2 SERPL-SCNC: 25 MMOL/L (ref 21–32)
CREAT SERPL-MCNC: 1.1 MG/DL (ref 0.8–1.3)
DEPRECATED RDW RBC AUTO: 14.1 % (ref 12.4–15.4)
GFR SERPLBLD CREATININE-BSD FMLA CKD-EPI: >60 ML/MIN/{1.73_M2}
GLUCOSE SERPL-MCNC: 124 MG/DL (ref 70–99)
HCT VFR BLD AUTO: 35.2 % (ref 40.5–52.5)
HGB BLD-MCNC: 11.7 G/DL (ref 13.5–17.5)
MCH RBC QN AUTO: 30 PG (ref 26–34)
MCHC RBC AUTO-ENTMCNC: 33.2 G/DL (ref 31–36)
MCV RBC AUTO: 90.3 FL (ref 80–100)
PLATELET # BLD AUTO: 240 K/UL (ref 135–450)
PMV BLD AUTO: 7.8 FL (ref 5–10.5)
POTASSIUM SERPL-SCNC: 3.9 MMOL/L (ref 3.5–5.1)
RBC # BLD AUTO: 3.9 M/UL (ref 4.2–5.9)
SODIUM SERPL-SCNC: 138 MMOL/L (ref 136–145)
WBC # BLD AUTO: 9.3 K/UL (ref 4–11)

## 2023-04-20 PROCEDURE — 3700000000 HC ANESTHESIA ATTENDED CARE: Performed by: ORTHOPAEDIC SURGERY

## 2023-04-20 PROCEDURE — 7100000000 HC PACU RECOVERY - FIRST 15 MIN: Performed by: ORTHOPAEDIC SURGERY

## 2023-04-20 PROCEDURE — 2720000010 HC SURG SUPPLY STERILE: Performed by: ORTHOPAEDIC SURGERY

## 2023-04-20 PROCEDURE — 2709999900 HC NON-CHARGEABLE SUPPLY: Performed by: ORTHOPAEDIC SURGERY

## 2023-04-20 PROCEDURE — A4217 STERILE WATER/SALINE, 500 ML: HCPCS | Performed by: ORTHOPAEDIC SURGERY

## 2023-04-20 PROCEDURE — 6370000000 HC RX 637 (ALT 250 FOR IP): Performed by: ORTHOPAEDIC SURGERY

## 2023-04-20 PROCEDURE — 6370000000 HC RX 637 (ALT 250 FOR IP): Performed by: INTERNAL MEDICINE

## 2023-04-20 PROCEDURE — 3600000005 HC SURGERY LEVEL 5 BASE: Performed by: ORTHOPAEDIC SURGERY

## 2023-04-20 PROCEDURE — 2780000010 HC IMPLANT OTHER: Performed by: ORTHOPAEDIC SURGERY

## 2023-04-20 PROCEDURE — 1200000000 HC SEMI PRIVATE

## 2023-04-20 PROCEDURE — 3700000001 HC ADD 15 MINUTES (ANESTHESIA): Performed by: ORTHOPAEDIC SURGERY

## 2023-04-20 PROCEDURE — 6360000002 HC RX W HCPCS: Performed by: SPECIALIST/TECHNOLOGIST

## 2023-04-20 PROCEDURE — 0QS706Z REPOSITION LEFT UPPER FEMUR WITH INTRAMEDULLARY INTERNAL FIXATION DEVICE, OPEN APPROACH: ICD-10-PCS | Performed by: ORTHOPAEDIC SURGERY

## 2023-04-20 PROCEDURE — 80048 BASIC METABOLIC PNL TOTAL CA: CPT

## 2023-04-20 PROCEDURE — 3600000015 HC SURGERY LEVEL 5 ADDTL 15MIN: Performed by: ORTHOPAEDIC SURGERY

## 2023-04-20 PROCEDURE — 27506 TREATMENT OF THIGH FRACTURE: CPT | Performed by: ORTHOPAEDIC SURGERY

## 2023-04-20 PROCEDURE — 2580000003 HC RX 258: Performed by: ORTHOPAEDIC SURGERY

## 2023-04-20 PROCEDURE — 6360000002 HC RX W HCPCS: Performed by: ORTHOPAEDIC SURGERY

## 2023-04-20 PROCEDURE — 2580000003 HC RX 258: Performed by: INTERNAL MEDICINE

## 2023-04-20 PROCEDURE — 2580000003 HC RX 258: Performed by: NURSE ANESTHETIST, CERTIFIED REGISTERED

## 2023-04-20 PROCEDURE — 85027 COMPLETE CBC AUTOMATED: CPT

## 2023-04-20 PROCEDURE — 2500000003 HC RX 250 WO HCPCS: Performed by: NURSE ANESTHETIST, CERTIFIED REGISTERED

## 2023-04-20 PROCEDURE — 7100000001 HC PACU RECOVERY - ADDTL 15 MIN: Performed by: ORTHOPAEDIC SURGERY

## 2023-04-20 PROCEDURE — C1713 ANCHOR/SCREW BN/BN,TIS/BN: HCPCS | Performed by: ORTHOPAEDIC SURGERY

## 2023-04-20 PROCEDURE — 6360000002 HC RX W HCPCS: Performed by: NURSE ANESTHETIST, CERTIFIED REGISTERED

## 2023-04-20 PROCEDURE — 27506 TREATMENT OF THIGH FRACTURE: CPT | Performed by: NURSE PRACTITIONER

## 2023-04-20 PROCEDURE — 6360000002 HC RX W HCPCS: Performed by: INTERNAL MEDICINE

## 2023-04-20 PROCEDURE — 3209999900 FLUORO FOR SURGICAL PROCEDURES

## 2023-04-20 PROCEDURE — 36415 COLL VENOUS BLD VENIPUNCTURE: CPT

## 2023-04-20 PROCEDURE — 73552 X-RAY EXAM OF FEMUR 2/>: CPT

## 2023-04-20 PROCEDURE — C1769 GUIDE WIRE: HCPCS | Performed by: ORTHOPAEDIC SURGERY

## 2023-04-20 DEVICE — LOCKING SCREW
Type: IMPLANTABLE DEVICE | Site: HIP | Status: FUNCTIONAL
Brand: T2 ALPHA

## 2023-04-20 DEVICE — LAG SCREW
Type: IMPLANTABLE DEVICE | Site: HIP | Status: FUNCTIONAL
Brand: GAMMA

## 2023-04-20 DEVICE — IMPLANTABLE DEVICE
Type: IMPLANTABLE DEVICE | Site: HIP | Status: FUNCTIONAL
Brand: CABLE-READY®

## 2023-04-20 RX ORDER — ONDANSETRON 2 MG/ML
INJECTION INTRAMUSCULAR; INTRAVENOUS PRN
Status: DISCONTINUED | OUTPATIENT
Start: 2023-04-20 | End: 2023-04-20 | Stop reason: SDUPTHER

## 2023-04-20 RX ORDER — SODIUM CHLORIDE 0.9 % (FLUSH) 0.9 %
5-40 SYRINGE (ML) INJECTION EVERY 12 HOURS SCHEDULED
Status: DISCONTINUED | OUTPATIENT
Start: 2023-04-20 | End: 2023-04-25 | Stop reason: HOSPADM

## 2023-04-20 RX ORDER — SODIUM CHLORIDE 0.9 % (FLUSH) 0.9 %
5-40 SYRINGE (ML) INJECTION PRN
Status: DISCONTINUED | OUTPATIENT
Start: 2023-04-20 | End: 2023-04-20 | Stop reason: HOSPADM

## 2023-04-20 RX ORDER — SODIUM CHLORIDE, SODIUM LACTATE, POTASSIUM CHLORIDE, CALCIUM CHLORIDE 600; 310; 30; 20 MG/100ML; MG/100ML; MG/100ML; MG/100ML
INJECTION, SOLUTION INTRAVENOUS CONTINUOUS PRN
Status: DISCONTINUED | OUTPATIENT
Start: 2023-04-20 | End: 2023-04-20 | Stop reason: SDUPTHER

## 2023-04-20 RX ORDER — ROCURONIUM BROMIDE 10 MG/ML
INJECTION, SOLUTION INTRAVENOUS PRN
Status: DISCONTINUED | OUTPATIENT
Start: 2023-04-20 | End: 2023-04-20 | Stop reason: SDUPTHER

## 2023-04-20 RX ORDER — ENOXAPARIN SODIUM 100 MG/ML
40 INJECTION SUBCUTANEOUS DAILY
Status: DISCONTINUED | OUTPATIENT
Start: 2023-04-21 | End: 2023-04-25 | Stop reason: HOSPADM

## 2023-04-20 RX ORDER — SODIUM CHLORIDE 0.9 % (FLUSH) 0.9 %
5-40 SYRINGE (ML) INJECTION PRN
Status: DISCONTINUED | OUTPATIENT
Start: 2023-04-20 | End: 2023-04-25 | Stop reason: HOSPADM

## 2023-04-20 RX ORDER — ONDANSETRON 2 MG/ML
4 INJECTION INTRAMUSCULAR; INTRAVENOUS
Status: DISCONTINUED | OUTPATIENT
Start: 2023-04-20 | End: 2023-04-20 | Stop reason: HOSPADM

## 2023-04-20 RX ORDER — SODIUM CHLORIDE 0.9 % (FLUSH) 0.9 %
5-40 SYRINGE (ML) INJECTION EVERY 12 HOURS SCHEDULED
Status: DISCONTINUED | OUTPATIENT
Start: 2023-04-20 | End: 2023-04-20 | Stop reason: HOSPADM

## 2023-04-20 RX ORDER — LIDOCAINE HYDROCHLORIDE 20 MG/ML
INJECTION, SOLUTION INFILTRATION; PERINEURAL PRN
Status: DISCONTINUED | OUTPATIENT
Start: 2023-04-20 | End: 2023-04-20 | Stop reason: SDUPTHER

## 2023-04-20 RX ORDER — SODIUM CHLORIDE 9 MG/ML
INJECTION, SOLUTION INTRAVENOUS PRN
Status: DISCONTINUED | OUTPATIENT
Start: 2023-04-20 | End: 2023-04-25 | Stop reason: HOSPADM

## 2023-04-20 RX ORDER — OXYCODONE HYDROCHLORIDE 5 MG/1
10 TABLET ORAL PRN
Status: DISCONTINUED | OUTPATIENT
Start: 2023-04-20 | End: 2023-04-20 | Stop reason: HOSPADM

## 2023-04-20 RX ORDER — SODIUM CHLORIDE 9 MG/ML
25 INJECTION, SOLUTION INTRAVENOUS PRN
Status: DISCONTINUED | OUTPATIENT
Start: 2023-04-20 | End: 2023-04-20 | Stop reason: HOSPADM

## 2023-04-20 RX ORDER — MORPHINE SULFATE 2 MG/ML
2 INJECTION, SOLUTION INTRAMUSCULAR; INTRAVENOUS ONCE
Status: COMPLETED | OUTPATIENT
Start: 2023-04-20 | End: 2023-04-20

## 2023-04-20 RX ORDER — CEFAZOLIN SODIUM IN 0.9 % NACL 2 G/100 ML
2000 PLASTIC BAG, INJECTION (ML) INTRAVENOUS
Status: COMPLETED | OUTPATIENT
Start: 2023-04-20 | End: 2023-04-20

## 2023-04-20 RX ORDER — MAGNESIUM HYDROXIDE 1200 MG/15ML
LIQUID ORAL CONTINUOUS PRN
Status: DISCONTINUED | OUTPATIENT
Start: 2023-04-20 | End: 2023-04-20 | Stop reason: HOSPADM

## 2023-04-20 RX ORDER — DEXAMETHASONE SODIUM PHOSPHATE 10 MG/ML
INJECTION INTRAMUSCULAR; INTRAVENOUS PRN
Status: DISCONTINUED | OUTPATIENT
Start: 2023-04-20 | End: 2023-04-20 | Stop reason: SDUPTHER

## 2023-04-20 RX ORDER — PHENYLEPHRINE HCL IN 0.9% NACL 1 MG/10 ML
SYRINGE (ML) INTRAVENOUS PRN
Status: DISCONTINUED | OUTPATIENT
Start: 2023-04-20 | End: 2023-04-20 | Stop reason: SDUPTHER

## 2023-04-20 RX ORDER — LABETALOL HYDROCHLORIDE 5 MG/ML
10 INJECTION, SOLUTION INTRAVENOUS
Status: DISCONTINUED | OUTPATIENT
Start: 2023-04-20 | End: 2023-04-20 | Stop reason: HOSPADM

## 2023-04-20 RX ORDER — SODIUM CHLORIDE 450 MG/100ML
INJECTION, SOLUTION INTRAVENOUS CONTINUOUS
Status: DISCONTINUED | OUTPATIENT
Start: 2023-04-20 | End: 2023-04-21

## 2023-04-20 RX ORDER — DIPHENHYDRAMINE HYDROCHLORIDE 50 MG/ML
12.5 INJECTION INTRAMUSCULAR; INTRAVENOUS
Status: DISCONTINUED | OUTPATIENT
Start: 2023-04-20 | End: 2023-04-20 | Stop reason: HOSPADM

## 2023-04-20 RX ORDER — PROPOFOL 10 MG/ML
INJECTION, EMULSION INTRAVENOUS PRN
Status: DISCONTINUED | OUTPATIENT
Start: 2023-04-20 | End: 2023-04-20 | Stop reason: SDUPTHER

## 2023-04-20 RX ORDER — OLANZAPINE 10 MG/1
5 INJECTION, POWDER, LYOPHILIZED, FOR SOLUTION INTRAMUSCULAR ONCE
Status: COMPLETED | OUTPATIENT
Start: 2023-04-21 | End: 2023-04-21

## 2023-04-20 RX ORDER — MEPERIDINE HYDROCHLORIDE 50 MG/ML
12.5 INJECTION INTRAMUSCULAR; INTRAVENOUS; SUBCUTANEOUS EVERY 5 MIN PRN
Status: DISCONTINUED | OUTPATIENT
Start: 2023-04-20 | End: 2023-04-20 | Stop reason: HOSPADM

## 2023-04-20 RX ORDER — OXYCODONE HYDROCHLORIDE 5 MG/1
5 TABLET ORAL PRN
Status: DISCONTINUED | OUTPATIENT
Start: 2023-04-20 | End: 2023-04-20 | Stop reason: HOSPADM

## 2023-04-20 RX ORDER — CEFAZOLIN SODIUM IN 0.9 % NACL 2 G/100 ML
2000 PLASTIC BAG, INJECTION (ML) INTRAVENOUS EVERY 8 HOURS
Status: COMPLETED | OUTPATIENT
Start: 2023-04-20 | End: 2023-04-21

## 2023-04-20 RX ADMIN — SODIUM CHLORIDE, SODIUM LACTATE, POTASSIUM CHLORIDE, AND CALCIUM CHLORIDE: .6; .31; .03; .02 INJECTION, SOLUTION INTRAVENOUS at 11:18

## 2023-04-20 RX ADMIN — RIVASTIGMINE TARTRATE 6 MG: 1.5 CAPSULE ORAL at 19:45

## 2023-04-20 RX ADMIN — LIDOCAINE HYDROCHLORIDE 80 MG: 20 INJECTION, SOLUTION INFILTRATION; PERINEURAL at 11:24

## 2023-04-20 RX ADMIN — ATORVASTATIN CALCIUM 40 MG: 40 TABLET, FILM COATED ORAL at 07:53

## 2023-04-20 RX ADMIN — DEXAMETHASONE SODIUM PHOSPHATE 10 MG: 10 INJECTION INTRAMUSCULAR; INTRAVENOUS at 11:28

## 2023-04-20 RX ADMIN — SODIUM CHLORIDE, SODIUM LACTATE, POTASSIUM CHLORIDE, AND CALCIUM CHLORIDE: .6; .31; .03; .02 INJECTION, SOLUTION INTRAVENOUS at 12:30

## 2023-04-20 RX ADMIN — ROCURONIUM BROMIDE 50 MG: 10 SOLUTION INTRAVENOUS at 11:24

## 2023-04-20 RX ADMIN — OLANZAPINE 5 MG: 5 TABLET, FILM COATED ORAL at 19:45

## 2023-04-20 RX ADMIN — MORPHINE SULFATE 2 MG: 2 INJECTION, SOLUTION INTRAMUSCULAR; INTRAVENOUS at 06:42

## 2023-04-20 RX ADMIN — Medication 2000 MG: at 19:44

## 2023-04-20 RX ADMIN — ROCURONIUM BROMIDE 10 MG: 10 SOLUTION INTRAVENOUS at 12:15

## 2023-04-20 RX ADMIN — MORPHINE SULFATE 4 MG: 4 INJECTION, SOLUTION INTRAMUSCULAR; INTRAVENOUS at 04:42

## 2023-04-20 RX ADMIN — Medication 10 ML: at 07:55

## 2023-04-20 RX ADMIN — ROCURONIUM BROMIDE 10 MG: 10 SOLUTION INTRAVENOUS at 11:45

## 2023-04-20 RX ADMIN — ONDANSETRON 4 MG: 2 INJECTION INTRAMUSCULAR; INTRAVENOUS at 11:28

## 2023-04-20 RX ADMIN — MORPHINE SULFATE 4 MG: 4 INJECTION, SOLUTION INTRAMUSCULAR; INTRAVENOUS at 19:45

## 2023-04-20 RX ADMIN — SODIUM CHLORIDE: 4.5 INJECTION, SOLUTION INTRAVENOUS at 17:32

## 2023-04-20 RX ADMIN — PROPOFOL 120 MG: 10 INJECTION, EMULSION INTRAVENOUS at 11:24

## 2023-04-20 RX ADMIN — SUGAMMADEX 200 MG: 100 INJECTION, SOLUTION INTRAVENOUS at 13:35

## 2023-04-20 RX ADMIN — Medication 100 MCG: at 11:26

## 2023-04-20 RX ADMIN — Medication 100 MCG: at 11:29

## 2023-04-20 RX ADMIN — ATENOLOL 50 MG: 25 TABLET ORAL at 07:53

## 2023-04-20 RX ADMIN — Medication 2000 MG: at 11:28

## 2023-04-20 RX ADMIN — Medication 100 MCG: at 13:17

## 2023-04-20 ASSESSMENT — PAIN DESCRIPTION - LOCATION
LOCATION: HIP
LOCATION: HIP

## 2023-04-20 ASSESSMENT — PAIN DESCRIPTION - DESCRIPTORS: DESCRIPTORS: ACHING;SORE

## 2023-04-20 ASSESSMENT — PAIN DESCRIPTION - ORIENTATION
ORIENTATION: LEFT
ORIENTATION: LEFT

## 2023-04-20 ASSESSMENT — PAIN - FUNCTIONAL ASSESSMENT: PAIN_FUNCTIONAL_ASSESSMENT: ACTIVITIES ARE NOT PREVENTED

## 2023-04-20 ASSESSMENT — PAIN SCALES - GENERAL
PAINLEVEL_OUTOF10: 7
PAINLEVEL_OUTOF10: 7
PAINLEVEL_OUTOF10: 3
PAINLEVEL_OUTOF10: 7

## 2023-04-20 NOTE — ANESTHESIA POSTPROCEDURE EVALUATION
Department of Anesthesiology  Postprocedure Note    Patient: Marielena Persaud  MRN: [id number]  YOB: 1945  Date of evaluation: 4/20/2023      Procedure Summary     Date: 04/20/23 Room / Location: [address] [place] [address] [address] / [place]    Anesthesia Start: 1119 Anesthesia Stop: 1347    Procedure: LEFT HIP GAMMA NAILING WITH CABLES                  GUERITA (Left: Hip) Diagnosis:       Closed fracture of left hip, initial encounter ([facility] [address]      (Closed fracture of left hip, initial encounter ([facility] [address] Gil Zepeda)    Surgeons: Suma Simental MD Responsible Provider: Cl Whitehead MD    Anesthesia Type: general ASA Status: 3          Anesthesia Type: No value filed.     Santos Phase I: Santos Score: 9    Santos Phase II:        Anesthesia Post Evaluation    Patient location during evaluation: PACU  Level of consciousness: awake  Airway patency: patent  Nausea & Vomiting: no nausea  Complications: no  Cardiovascular status: blood pressure returned to baseline  Respiratory status: acceptable  Hydration status: euvolemic

## 2023-04-20 NOTE — ANESTHESIA PRE PROCEDURE
fibrillation, hyperlipidemia                  Neuro/Psych:   (+) neuromuscular disease:, TIA, psychiatric history:dementia            GI/Hepatic/Renal:   (+) renal disease: CRI,           Endo/Other:    (+) : arthritis: OA., .                 Abdominal:             Vascular: negative vascular ROS. Other Findings:           Anesthesia Plan      general     ASA 3     (Pt agrees to risks, benefits and alternatives of GETA. Questions answered. Willing to proceed with plan.)  Induction: intravenous. Anesthetic plan and risks discussed with patient, child/children and healthcare power of .                         Philip Silveira MD   4/20/2023

## 2023-04-21 LAB
ANION GAP SERPL CALCULATED.3IONS-SCNC: 12 MMOL/L (ref 3–16)
BUN SERPL-MCNC: 32 MG/DL (ref 7–20)
CALCIUM SERPL-MCNC: 8.4 MG/DL (ref 8.3–10.6)
CHLORIDE SERPL-SCNC: 103 MMOL/L (ref 99–110)
CO2 SERPL-SCNC: 23 MMOL/L (ref 21–32)
CREAT SERPL-MCNC: 1.2 MG/DL (ref 0.8–1.3)
DEPRECATED RDW RBC AUTO: 13.8 % (ref 12.4–15.4)
GFR SERPLBLD CREATININE-BSD FMLA CKD-EPI: >60 ML/MIN/{1.73_M2}
GLUCOSE SERPL-MCNC: 143 MG/DL (ref 70–99)
HCT VFR BLD AUTO: 26.2 % (ref 40.5–52.5)
HGB BLD-MCNC: 8.9 G/DL (ref 13.5–17.5)
MCH RBC QN AUTO: 30.7 PG (ref 26–34)
MCHC RBC AUTO-ENTMCNC: 34 G/DL (ref 31–36)
MCV RBC AUTO: 90.1 FL (ref 80–100)
PLATELET # BLD AUTO: 256 K/UL (ref 135–450)
PMV BLD AUTO: 8.2 FL (ref 5–10.5)
POTASSIUM SERPL-SCNC: 4 MMOL/L (ref 3.5–5.1)
RBC # BLD AUTO: 2.91 M/UL (ref 4.2–5.9)
SODIUM SERPL-SCNC: 138 MMOL/L (ref 136–145)
WBC # BLD AUTO: 20.8 K/UL (ref 4–11)

## 2023-04-21 PROCEDURE — 6360000002 HC RX W HCPCS: Performed by: INTERNAL MEDICINE

## 2023-04-21 PROCEDURE — 6370000000 HC RX 637 (ALT 250 FOR IP): Performed by: SPECIALIST/TECHNOLOGIST

## 2023-04-21 PROCEDURE — 36415 COLL VENOUS BLD VENIPUNCTURE: CPT

## 2023-04-21 PROCEDURE — 97530 THERAPEUTIC ACTIVITIES: CPT

## 2023-04-21 PROCEDURE — 6360000002 HC RX W HCPCS: Performed by: ORTHOPAEDIC SURGERY

## 2023-04-21 PROCEDURE — 6370000000 HC RX 637 (ALT 250 FOR IP): Performed by: ORTHOPAEDIC SURGERY

## 2023-04-21 PROCEDURE — 85027 COMPLETE CBC AUTOMATED: CPT

## 2023-04-21 PROCEDURE — 2500000003 HC RX 250 WO HCPCS: Performed by: NURSE PRACTITIONER

## 2023-04-21 PROCEDURE — APPNB60 APP NON BILLABLE TIME 46-60 MINS: Performed by: SPECIALIST/TECHNOLOGIST

## 2023-04-21 PROCEDURE — 97110 THERAPEUTIC EXERCISES: CPT

## 2023-04-21 PROCEDURE — 97535 SELF CARE MNGMENT TRAINING: CPT

## 2023-04-21 PROCEDURE — 2580000003 HC RX 258: Performed by: ORTHOPAEDIC SURGERY

## 2023-04-21 PROCEDURE — 80048 BASIC METABOLIC PNL TOTAL CA: CPT

## 2023-04-21 PROCEDURE — 97166 OT EVAL MOD COMPLEX 45 MIN: CPT

## 2023-04-21 PROCEDURE — 99024 POSTOP FOLLOW-UP VISIT: CPT | Performed by: SPECIALIST/TECHNOLOGIST

## 2023-04-21 PROCEDURE — 1200000000 HC SEMI PRIVATE

## 2023-04-21 PROCEDURE — 97162 PT EVAL MOD COMPLEX 30 MIN: CPT

## 2023-04-21 RX ORDER — ACETAMINOPHEN 325 MG/1
650 TABLET ORAL EVERY 6 HOURS
Status: DISCONTINUED | OUTPATIENT
Start: 2023-04-21 | End: 2023-04-25 | Stop reason: HOSPADM

## 2023-04-21 RX ORDER — METHOCARBAMOL 500 MG/1
500 TABLET, FILM COATED ORAL 3 TIMES DAILY
Status: DISCONTINUED | OUTPATIENT
Start: 2023-04-21 | End: 2023-04-24

## 2023-04-21 RX ORDER — LORAZEPAM 2 MG/ML
0.5 INJECTION INTRAMUSCULAR EVERY 4 HOURS PRN
Status: DISCONTINUED | OUTPATIENT
Start: 2023-04-21 | End: 2023-04-25 | Stop reason: HOSPADM

## 2023-04-21 RX ORDER — POLYETHYLENE GLYCOL 3350 17 G/17G
17 POWDER, FOR SOLUTION ORAL DAILY
Status: DISCONTINUED | OUTPATIENT
Start: 2023-04-21 | End: 2023-04-25 | Stop reason: HOSPADM

## 2023-04-21 RX ORDER — ENOXAPARIN SODIUM 100 MG/ML
40 INJECTION SUBCUTANEOUS DAILY
DISCHARGE
Start: 2023-04-22

## 2023-04-21 RX ORDER — OXYCODONE HYDROCHLORIDE 5 MG/1
5 TABLET ORAL EVERY 4 HOURS PRN
Status: DISCONTINUED | OUTPATIENT
Start: 2023-04-21 | End: 2023-04-25 | Stop reason: HOSPADM

## 2023-04-21 RX ORDER — ACETAMINOPHEN 325 MG/1
650 TABLET ORAL EVERY 6 HOURS
Qty: 120 TABLET | Refills: 3 | DISCHARGE
Start: 2023-04-21

## 2023-04-21 RX ORDER — OXYCODONE HYDROCHLORIDE 5 MG/1
5 TABLET ORAL EVERY 6 HOURS PRN
Qty: 28 TABLET | Refills: 0 | Status: SHIPPED | OUTPATIENT
Start: 2023-04-21 | End: 2023-04-28

## 2023-04-21 RX ORDER — POLYETHYLENE GLYCOL 3350 17 G/17G
17 POWDER, FOR SOLUTION ORAL DAILY
Qty: 527 G | Refills: 1 | DISCHARGE
Start: 2023-04-21 | End: 2023-05-21

## 2023-04-21 RX ORDER — OXYCODONE HYDROCHLORIDE 5 MG/1
10 TABLET ORAL EVERY 4 HOURS PRN
Status: DISCONTINUED | OUTPATIENT
Start: 2023-04-21 | End: 2023-04-25 | Stop reason: HOSPADM

## 2023-04-21 RX ADMIN — ENOXAPARIN SODIUM 40 MG: 100 INJECTION SUBCUTANEOUS at 09:30

## 2023-04-21 RX ADMIN — ATORVASTATIN CALCIUM 40 MG: 40 TABLET, FILM COATED ORAL at 09:30

## 2023-04-21 RX ADMIN — METHOCARBAMOL 500 MG: 500 TABLET ORAL at 14:14

## 2023-04-21 RX ADMIN — METHOCARBAMOL 500 MG: 500 TABLET ORAL at 20:18

## 2023-04-21 RX ADMIN — FLUOXETINE 60 MG: 20 CAPSULE ORAL at 09:29

## 2023-04-21 RX ADMIN — MORPHINE SULFATE 4 MG: 4 INJECTION, SOLUTION INTRAMUSCULAR; INTRAVENOUS at 02:18

## 2023-04-21 RX ADMIN — ACETAMINOPHEN 650 MG: 325 TABLET ORAL at 14:19

## 2023-04-21 RX ADMIN — Medication 10 ML: at 20:19

## 2023-04-21 RX ADMIN — POLYETHYLENE GLYCOL 3350 17 G: 17 POWDER, FOR SOLUTION ORAL at 14:19

## 2023-04-21 RX ADMIN — OLANZAPINE 5 MG: 5 TABLET, FILM COATED ORAL at 20:18

## 2023-04-21 RX ADMIN — RIVASTIGMINE TARTRATE 6 MG: 1.5 CAPSULE ORAL at 20:17

## 2023-04-21 RX ADMIN — OXYCODONE 10 MG: 5 TABLET ORAL at 20:18

## 2023-04-21 RX ADMIN — OXYCODONE 10 MG: 5 TABLET ORAL at 16:17

## 2023-04-21 RX ADMIN — LORAZEPAM 0.5 MG: 2 INJECTION INTRAMUSCULAR; INTRAVENOUS at 20:17

## 2023-04-21 RX ADMIN — LORAZEPAM 0.5 MG: 2 INJECTION INTRAMUSCULAR; INTRAVENOUS at 17:13

## 2023-04-21 RX ADMIN — ACETAMINOPHEN 650 MG: 325 TABLET ORAL at 20:18

## 2023-04-21 RX ADMIN — ATENOLOL 50 MG: 25 TABLET ORAL at 09:30

## 2023-04-21 RX ADMIN — RIVASTIGMINE TARTRATE 6 MG: 1.5 CAPSULE ORAL at 09:35

## 2023-04-21 RX ADMIN — ACETAMINOPHEN 650 MG: 325 TABLET ORAL at 09:31

## 2023-04-21 RX ADMIN — Medication 2000 MG: at 02:21

## 2023-04-21 RX ADMIN — OLANZAPINE 5 MG: 10 INJECTION, POWDER, FOR SOLUTION INTRAMUSCULAR at 01:19

## 2023-04-21 ASSESSMENT — PAIN SCALES - PAIN ASSESSMENT IN ADVANCED DEMENTIA (PAINAD)
FACIALEXPRESSION: 2
BREATHING: 1
CONSOLABILITY: 1
CONSOLABILITY: 1
BODYLANGUAGE: 2
BREATHING: 1
NEGVOCALIZATION: 1
FACIALEXPRESSION: 2
NEGVOCALIZATION: 1
TOTALSCORE: 7
BREATHING: 1
TOTALSCORE: 6
BREATHING: 1
TOTALSCORE: 7
NEGVOCALIZATION: 1
CONSOLABILITY: 1
BODYLANGUAGE: 2
NEGVOCALIZATION: 1
FACIALEXPRESSION: 2
BODYLANGUAGE: 2
BREATHING: 1
BODYLANGUAGE: 2
FACIALEXPRESSION: 2
BODYLANGUAGE: 2
NEGVOCALIZATION: 1
BODYLANGUAGE: 2
NEGVOCALIZATION: 1
BREATHING: 0
CONSOLABILITY: 1
TOTALSCORE: 7
CONSOLABILITY: 1
NEGVOCALIZATION: 1
FACIALEXPRESSION: 2
FACIALEXPRESSION: 2
BREATHING: 0
FACIALEXPRESSION: 2
TOTALSCORE: 6
CONSOLABILITY: 1
BODYLANGUAGE: 2
CONSOLABILITY: 1

## 2023-04-21 ASSESSMENT — PAIN - FUNCTIONAL ASSESSMENT: PAIN_FUNCTIONAL_ASSESSMENT: PREVENTS OR INTERFERES SOME ACTIVE ACTIVITIES AND ADLS

## 2023-04-21 ASSESSMENT — PAIN DESCRIPTION - ORIENTATION: ORIENTATION: LEFT

## 2023-04-21 ASSESSMENT — PAIN SCALES - WONG BAKER
WONGBAKER_NUMERICALRESPONSE: 4
WONGBAKER_NUMERICALRESPONSE: 6
WONGBAKER_NUMERICALRESPONSE: 4
WONGBAKER_NUMERICALRESPONSE: 4

## 2023-04-21 ASSESSMENT — PAIN DESCRIPTION - DESCRIPTORS: DESCRIPTORS: SORE

## 2023-04-21 ASSESSMENT — PAIN SCALES - GENERAL
PAINLEVEL_OUTOF10: 6
PAINLEVEL_OUTOF10: 7
PAINLEVEL_OUTOF10: 4
PAINLEVEL_OUTOF10: 8

## 2023-04-21 ASSESSMENT — PAIN DESCRIPTION - LOCATION: LOCATION: LEG

## 2023-04-22 LAB
ANION GAP SERPL CALCULATED.3IONS-SCNC: 8 MMOL/L (ref 3–16)
BASOPHILS # BLD: 0.1 K/UL (ref 0–0.2)
BASOPHILS NFR BLD: 0.6 %
BUN SERPL-MCNC: 27 MG/DL (ref 7–20)
CALCIUM SERPL-MCNC: 8.1 MG/DL (ref 8.3–10.6)
CHLORIDE SERPL-SCNC: 103 MMOL/L (ref 99–110)
CO2 SERPL-SCNC: 26 MMOL/L (ref 21–32)
CREAT SERPL-MCNC: 1.1 MG/DL (ref 0.8–1.3)
DEPRECATED RDW RBC AUTO: 13.9 % (ref 12.4–15.4)
EOSINOPHIL # BLD: 0.3 K/UL (ref 0–0.6)
EOSINOPHIL NFR BLD: 2.2 %
GFR SERPLBLD CREATININE-BSD FMLA CKD-EPI: >60 ML/MIN/{1.73_M2}
GLUCOSE SERPL-MCNC: 116 MG/DL (ref 70–99)
HCT VFR BLD AUTO: 25.3 % (ref 40.5–52.5)
HGB BLD-MCNC: 8.6 G/DL (ref 13.5–17.5)
LYMPHOCYTES # BLD: 1.1 K/UL (ref 1–5.1)
LYMPHOCYTES NFR BLD: 9.4 %
MCH RBC QN AUTO: 30.5 PG (ref 26–34)
MCHC RBC AUTO-ENTMCNC: 34.2 G/DL (ref 31–36)
MCV RBC AUTO: 89.3 FL (ref 80–100)
MONOCYTES # BLD: 1.2 K/UL (ref 0–1.3)
MONOCYTES NFR BLD: 10 %
NEUTROPHILS # BLD: 9.5 K/UL (ref 1.7–7.7)
NEUTROPHILS NFR BLD: 77.8 %
PLATELET # BLD AUTO: 243 K/UL (ref 135–450)
PMV BLD AUTO: 8 FL (ref 5–10.5)
POTASSIUM SERPL-SCNC: 4.4 MMOL/L (ref 3.5–5.1)
RBC # BLD AUTO: 2.83 M/UL (ref 4.2–5.9)
SODIUM SERPL-SCNC: 137 MMOL/L (ref 136–145)
WBC # BLD AUTO: 12.2 K/UL (ref 4–11)

## 2023-04-22 PROCEDURE — 97530 THERAPEUTIC ACTIVITIES: CPT

## 2023-04-22 PROCEDURE — 6370000000 HC RX 637 (ALT 250 FOR IP): Performed by: ORTHOPAEDIC SURGERY

## 2023-04-22 PROCEDURE — 6360000002 HC RX W HCPCS: Performed by: INTERNAL MEDICINE

## 2023-04-22 PROCEDURE — 97110 THERAPEUTIC EXERCISES: CPT

## 2023-04-22 PROCEDURE — APPNB45 APP NON BILLABLE 31-45 MINUTES: Performed by: SPECIALIST/TECHNOLOGIST

## 2023-04-22 PROCEDURE — 80048 BASIC METABOLIC PNL TOTAL CA: CPT

## 2023-04-22 PROCEDURE — 99024 POSTOP FOLLOW-UP VISIT: CPT | Performed by: SPECIALIST/TECHNOLOGIST

## 2023-04-22 PROCEDURE — 6370000000 HC RX 637 (ALT 250 FOR IP): Performed by: SPECIALIST/TECHNOLOGIST

## 2023-04-22 PROCEDURE — 85025 COMPLETE CBC W/AUTO DIFF WBC: CPT

## 2023-04-22 PROCEDURE — 97535 SELF CARE MNGMENT TRAINING: CPT

## 2023-04-22 PROCEDURE — 94761 N-INVAS EAR/PLS OXIMETRY MLT: CPT

## 2023-04-22 PROCEDURE — 2700000000 HC OXYGEN THERAPY PER DAY

## 2023-04-22 PROCEDURE — 6370000000 HC RX 637 (ALT 250 FOR IP)

## 2023-04-22 PROCEDURE — 6360000002 HC RX W HCPCS: Performed by: ORTHOPAEDIC SURGERY

## 2023-04-22 PROCEDURE — 2580000003 HC RX 258: Performed by: ORTHOPAEDIC SURGERY

## 2023-04-22 PROCEDURE — 1200000000 HC SEMI PRIVATE

## 2023-04-22 PROCEDURE — 36415 COLL VENOUS BLD VENIPUNCTURE: CPT

## 2023-04-22 RX ORDER — LIDOCAINE 4 G/G
1 PATCH TOPICAL DAILY
Status: DISCONTINUED | OUTPATIENT
Start: 2023-04-22 | End: 2023-04-25 | Stop reason: HOSPADM

## 2023-04-22 RX ORDER — ZIPRASIDONE MESYLATE 20 MG/ML
20 INJECTION, POWDER, LYOPHILIZED, FOR SOLUTION INTRAMUSCULAR
Status: COMPLETED | OUTPATIENT
Start: 2023-04-22 | End: 2023-04-22

## 2023-04-22 RX ORDER — LORAZEPAM 2 MG/ML
0.5 INJECTION INTRAMUSCULAR ONCE
Status: COMPLETED | OUTPATIENT
Start: 2023-04-22 | End: 2023-04-22

## 2023-04-22 RX ADMIN — METHOCARBAMOL 500 MG: 500 TABLET ORAL at 22:56

## 2023-04-22 RX ADMIN — LORAZEPAM 0.5 MG: 2 INJECTION INTRAMUSCULAR; INTRAVENOUS at 17:49

## 2023-04-22 RX ADMIN — FLUOXETINE 60 MG: 20 CAPSULE ORAL at 08:09

## 2023-04-22 RX ADMIN — OXYCODONE 5 MG: 5 TABLET ORAL at 22:56

## 2023-04-22 RX ADMIN — METHOCARBAMOL 500 MG: 500 TABLET ORAL at 08:10

## 2023-04-22 RX ADMIN — ACETAMINOPHEN 650 MG: 325 TABLET ORAL at 15:09

## 2023-04-22 RX ADMIN — ATORVASTATIN CALCIUM 40 MG: 40 TABLET, FILM COATED ORAL at 08:09

## 2023-04-22 RX ADMIN — ZIPRASIDONE MESYLATE 20 MG: 20 INJECTION, POWDER, LYOPHILIZED, FOR SOLUTION INTRAMUSCULAR at 18:17

## 2023-04-22 RX ADMIN — Medication 10 ML: at 08:11

## 2023-04-22 RX ADMIN — LORAZEPAM 0.5 MG: 2 INJECTION INTRAMUSCULAR; INTRAVENOUS at 00:46

## 2023-04-22 RX ADMIN — OXYCODONE 10 MG: 5 TABLET ORAL at 00:46

## 2023-04-22 RX ADMIN — RIVASTIGMINE TARTRATE 6 MG: 1.5 CAPSULE ORAL at 08:10

## 2023-04-22 RX ADMIN — POLYETHYLENE GLYCOL 3350 17 G: 17 POWDER, FOR SOLUTION ORAL at 08:10

## 2023-04-22 RX ADMIN — ACETAMINOPHEN 650 MG: 325 TABLET ORAL at 08:10

## 2023-04-22 RX ADMIN — LORAZEPAM 0.5 MG: 2 INJECTION INTRAMUSCULAR; INTRAVENOUS at 15:09

## 2023-04-22 RX ADMIN — ATENOLOL 50 MG: 25 TABLET ORAL at 08:10

## 2023-04-22 RX ADMIN — OLANZAPINE 5 MG: 5 TABLET, FILM COATED ORAL at 22:56

## 2023-04-22 RX ADMIN — RIVASTIGMINE TARTRATE 6 MG: 1.5 CAPSULE ORAL at 22:56

## 2023-04-22 RX ADMIN — METHOCARBAMOL 500 MG: 500 TABLET ORAL at 15:09

## 2023-04-22 RX ADMIN — OXYCODONE 10 MG: 5 TABLET ORAL at 08:10

## 2023-04-22 RX ADMIN — ACETAMINOPHEN 650 MG: 325 TABLET ORAL at 00:46

## 2023-04-22 RX ADMIN — ENOXAPARIN SODIUM 40 MG: 100 INJECTION SUBCUTANEOUS at 08:10

## 2023-04-22 ASSESSMENT — PAIN SCALES - GENERAL
PAINLEVEL_OUTOF10: 10
PAINLEVEL_OUTOF10: 10
PAINLEVEL_OUTOF10: 8
PAINLEVEL_OUTOF10: 10
PAINLEVEL_OUTOF10: 0
PAINLEVEL_OUTOF10: 4

## 2023-04-22 ASSESSMENT — PAIN DESCRIPTION - ONSET: ONSET: ON-GOING

## 2023-04-22 ASSESSMENT — PAIN DESCRIPTION - PAIN TYPE: TYPE: ACUTE PAIN;SURGICAL PAIN

## 2023-04-22 ASSESSMENT — PAIN SCALES - WONG BAKER
WONGBAKER_NUMERICALRESPONSE: 4
WONGBAKER_NUMERICALRESPONSE: 8

## 2023-04-22 ASSESSMENT — PAIN SCALES - PAIN ASSESSMENT IN ADVANCED DEMENTIA (PAINAD)
FACIALEXPRESSION: 0
BREATHING: 0
BODYLANGUAGE: 1
BODYLANGUAGE: 0
TOTALSCORE: 4
NEGVOCALIZATION: 0
NEGVOCALIZATION: 1
CONSOLABILITY: 0
TOTALSCORE: 0
BODYLANGUAGE: 0
TOTALSCORE: 0
NEGVOCALIZATION: 0
FACIALEXPRESSION: 0
FACIALEXPRESSION: 1
BREATHING: 0
BREATHING: 0
CONSOLABILITY: 0
CONSOLABILITY: 1

## 2023-04-22 ASSESSMENT — PAIN DESCRIPTION - ORIENTATION: ORIENTATION: LEFT

## 2023-04-22 ASSESSMENT — PAIN DESCRIPTION - FREQUENCY: FREQUENCY: CONTINUOUS

## 2023-04-22 ASSESSMENT — PAIN DESCRIPTION - DESCRIPTORS: DESCRIPTORS: SORE;ACHING

## 2023-04-22 ASSESSMENT — PAIN - FUNCTIONAL ASSESSMENT: PAIN_FUNCTIONAL_ASSESSMENT: PREVENTS OR INTERFERES SOME ACTIVE ACTIVITIES AND ADLS

## 2023-04-22 ASSESSMENT — PAIN DESCRIPTION - LOCATION: LOCATION: HIP;LEG

## 2023-04-23 LAB
BASOPHILS # BLD: 0.1 K/UL (ref 0–0.2)
BASOPHILS NFR BLD: 0.4 %
DEPRECATED RDW RBC AUTO: 13.8 % (ref 12.4–15.4)
EOSINOPHIL # BLD: 0.2 K/UL (ref 0–0.6)
EOSINOPHIL NFR BLD: 1.7 %
HCT VFR BLD AUTO: 23.5 % (ref 40.5–52.5)
HGB BLD-MCNC: 7.9 G/DL (ref 13.5–17.5)
LYMPHOCYTES # BLD: 0.7 K/UL (ref 1–5.1)
LYMPHOCYTES NFR BLD: 4.6 %
MCH RBC QN AUTO: 30 PG (ref 26–34)
MCHC RBC AUTO-ENTMCNC: 33.5 G/DL (ref 31–36)
MCV RBC AUTO: 89.7 FL (ref 80–100)
MONOCYTES # BLD: 1 K/UL (ref 0–1.3)
MONOCYTES NFR BLD: 6.8 %
NEUTROPHILS # BLD: 12.5 K/UL (ref 1.7–7.7)
NEUTROPHILS NFR BLD: 86.5 %
PLATELET # BLD AUTO: 292 K/UL (ref 135–450)
PMV BLD AUTO: 8 FL (ref 5–10.5)
RBC # BLD AUTO: 2.62 M/UL (ref 4.2–5.9)
WBC # BLD AUTO: 14.4 K/UL (ref 4–11)

## 2023-04-23 PROCEDURE — 6370000000 HC RX 637 (ALT 250 FOR IP)

## 2023-04-23 PROCEDURE — 6370000000 HC RX 637 (ALT 250 FOR IP): Performed by: SPECIALIST/TECHNOLOGIST

## 2023-04-23 PROCEDURE — 6360000002 HC RX W HCPCS

## 2023-04-23 PROCEDURE — 6370000000 HC RX 637 (ALT 250 FOR IP): Performed by: ORTHOPAEDIC SURGERY

## 2023-04-23 PROCEDURE — 85025 COMPLETE CBC W/AUTO DIFF WBC: CPT

## 2023-04-23 PROCEDURE — 36415 COLL VENOUS BLD VENIPUNCTURE: CPT

## 2023-04-23 PROCEDURE — 1200000000 HC SEMI PRIVATE

## 2023-04-23 PROCEDURE — 97530 THERAPEUTIC ACTIVITIES: CPT

## 2023-04-23 PROCEDURE — 2580000003 HC RX 258: Performed by: ORTHOPAEDIC SURGERY

## 2023-04-23 PROCEDURE — 97110 THERAPEUTIC EXERCISES: CPT

## 2023-04-23 PROCEDURE — 6360000002 HC RX W HCPCS: Performed by: ORTHOPAEDIC SURGERY

## 2023-04-23 PROCEDURE — 94761 N-INVAS EAR/PLS OXIMETRY MLT: CPT

## 2023-04-23 PROCEDURE — 2700000000 HC OXYGEN THERAPY PER DAY

## 2023-04-23 PROCEDURE — 97535 SELF CARE MNGMENT TRAINING: CPT

## 2023-04-23 RX ORDER — LORAZEPAM 2 MG/ML
2 INJECTION INTRAMUSCULAR ONCE
Status: COMPLETED | OUTPATIENT
Start: 2023-04-23 | End: 2023-04-23

## 2023-04-23 RX ORDER — ZIPRASIDONE MESYLATE 20 MG/ML
20 INJECTION, POWDER, LYOPHILIZED, FOR SOLUTION INTRAMUSCULAR ONCE
Status: COMPLETED | OUTPATIENT
Start: 2023-04-23 | End: 2023-04-23

## 2023-04-23 RX ADMIN — Medication 10 ML: at 11:41

## 2023-04-23 RX ADMIN — ATORVASTATIN CALCIUM 40 MG: 40 TABLET, FILM COATED ORAL at 11:38

## 2023-04-23 RX ADMIN — ZIPRASIDONE MESYLATE 20 MG: 20 INJECTION, POWDER, LYOPHILIZED, FOR SOLUTION INTRAMUSCULAR at 21:39

## 2023-04-23 RX ADMIN — LORAZEPAM 2 MG: 2 INJECTION INTRAMUSCULAR; INTRAVENOUS at 10:51

## 2023-04-23 RX ADMIN — RIVASTIGMINE TARTRATE 6 MG: 1.5 CAPSULE ORAL at 11:40

## 2023-04-23 RX ADMIN — FLUOXETINE 60 MG: 20 CAPSULE ORAL at 11:37

## 2023-04-23 RX ADMIN — METHOCARBAMOL 500 MG: 500 TABLET ORAL at 15:54

## 2023-04-23 RX ADMIN — ACETAMINOPHEN 650 MG: 325 TABLET ORAL at 18:35

## 2023-04-23 RX ADMIN — ATENOLOL 50 MG: 25 TABLET ORAL at 11:37

## 2023-04-23 RX ADMIN — OXYCODONE 10 MG: 5 TABLET ORAL at 11:38

## 2023-04-23 RX ADMIN — POLYETHYLENE GLYCOL 3350 17 G: 17 POWDER, FOR SOLUTION ORAL at 11:40

## 2023-04-23 RX ADMIN — OXYCODONE 10 MG: 5 TABLET ORAL at 18:35

## 2023-04-23 RX ADMIN — ENOXAPARIN SODIUM 40 MG: 100 INJECTION SUBCUTANEOUS at 11:37

## 2023-04-23 RX ADMIN — OLANZAPINE 5 MG: 5 TABLET, FILM COATED ORAL at 21:47

## 2023-04-23 RX ADMIN — METHOCARBAMOL 500 MG: 500 TABLET ORAL at 21:47

## 2023-04-23 RX ADMIN — ACETAMINOPHEN 650 MG: 325 TABLET ORAL at 11:38

## 2023-04-23 RX ADMIN — OXYCODONE 10 MG: 5 TABLET ORAL at 04:36

## 2023-04-23 RX ADMIN — METHOCARBAMOL 500 MG: 500 TABLET ORAL at 11:38

## 2023-04-23 RX ADMIN — Medication 10 ML: at 21:58

## 2023-04-23 RX ADMIN — RIVASTIGMINE TARTRATE 6 MG: 1.5 CAPSULE ORAL at 21:48

## 2023-04-23 ASSESSMENT — PAIN SCALES - GENERAL
PAINLEVEL_OUTOF10: 0
PAINLEVEL_OUTOF10: 5
PAINLEVEL_OUTOF10: 7
PAINLEVEL_OUTOF10: 3
PAINLEVEL_OUTOF10: 6
PAINLEVEL_OUTOF10: 8

## 2023-04-23 ASSESSMENT — PAIN SCALES - PAIN ASSESSMENT IN ADVANCED DEMENTIA (PAINAD)
NEGVOCALIZATION: 0
BREATHING: 0
BODYLANGUAGE: 0
FACIALEXPRESSION: 0
TOTALSCORE: 0
CONSOLABILITY: 0

## 2023-04-23 ASSESSMENT — PAIN DESCRIPTION - LOCATION
LOCATION: HIP

## 2023-04-23 ASSESSMENT — PAIN DESCRIPTION - DESCRIPTORS
DESCRIPTORS: ACHING;SORE
DESCRIPTORS: ACHING;SORE

## 2023-04-23 ASSESSMENT — PAIN DESCRIPTION - ORIENTATION
ORIENTATION: LEFT

## 2023-04-23 ASSESSMENT — PAIN SCALES - WONG BAKER: WONGBAKER_NUMERICALRESPONSE: 6

## 2023-04-24 LAB
BASOPHILS # BLD: 0 K/UL (ref 0–0.2)
BASOPHILS NFR BLD: 0.4 %
DEPRECATED RDW RBC AUTO: 13.4 % (ref 12.4–15.4)
EOSINOPHIL # BLD: 0.4 K/UL (ref 0–0.6)
EOSINOPHIL NFR BLD: 3.4 %
HCT VFR BLD AUTO: 29 % (ref 40.5–52.5)
HGB BLD-MCNC: 9.6 G/DL (ref 13.5–17.5)
LYMPHOCYTES # BLD: 1 K/UL (ref 1–5.1)
LYMPHOCYTES NFR BLD: 8.8 %
MCH RBC QN AUTO: 30.3 PG (ref 26–34)
MCHC RBC AUTO-ENTMCNC: 33.1 G/DL (ref 31–36)
MCV RBC AUTO: 91.7 FL (ref 80–100)
MONOCYTES # BLD: 0.9 K/UL (ref 0–1.3)
MONOCYTES NFR BLD: 8.5 %
NEUTROPHILS # BLD: 8.6 K/UL (ref 1.7–7.7)
NEUTROPHILS NFR BLD: 78.9 %
PLATELET # BLD AUTO: 334 K/UL (ref 135–450)
PMV BLD AUTO: 7.7 FL (ref 5–10.5)
RBC # BLD AUTO: 3.17 M/UL (ref 4.2–5.9)
WBC # BLD AUTO: 10.9 K/UL (ref 4–11)

## 2023-04-24 PROCEDURE — 97530 THERAPEUTIC ACTIVITIES: CPT

## 2023-04-24 PROCEDURE — 6370000000 HC RX 637 (ALT 250 FOR IP): Performed by: NURSE PRACTITIONER

## 2023-04-24 PROCEDURE — 2580000003 HC RX 258: Performed by: ORTHOPAEDIC SURGERY

## 2023-04-24 PROCEDURE — 97110 THERAPEUTIC EXERCISES: CPT

## 2023-04-24 PROCEDURE — 6370000000 HC RX 637 (ALT 250 FOR IP)

## 2023-04-24 PROCEDURE — 99024 POSTOP FOLLOW-UP VISIT: CPT | Performed by: SPECIALIST/TECHNOLOGIST

## 2023-04-24 PROCEDURE — 6370000000 HC RX 637 (ALT 250 FOR IP): Performed by: SPECIALIST/TECHNOLOGIST

## 2023-04-24 PROCEDURE — 2700000000 HC OXYGEN THERAPY PER DAY

## 2023-04-24 PROCEDURE — 6360000002 HC RX W HCPCS: Performed by: ORTHOPAEDIC SURGERY

## 2023-04-24 PROCEDURE — 85025 COMPLETE CBC W/AUTO DIFF WBC: CPT

## 2023-04-24 PROCEDURE — 97535 SELF CARE MNGMENT TRAINING: CPT

## 2023-04-24 PROCEDURE — 6370000000 HC RX 637 (ALT 250 FOR IP): Performed by: ORTHOPAEDIC SURGERY

## 2023-04-24 PROCEDURE — 1200000000 HC SEMI PRIVATE

## 2023-04-24 PROCEDURE — 94761 N-INVAS EAR/PLS OXIMETRY MLT: CPT

## 2023-04-24 PROCEDURE — APPNB45 APP NON BILLABLE 31-45 MINUTES: Performed by: SPECIALIST/TECHNOLOGIST

## 2023-04-24 PROCEDURE — 36415 COLL VENOUS BLD VENIPUNCTURE: CPT

## 2023-04-24 RX ORDER — CYCLOBENZAPRINE HCL 10 MG
5 TABLET ORAL 3 TIMES DAILY
Status: DISCONTINUED | OUTPATIENT
Start: 2023-04-24 | End: 2023-04-25 | Stop reason: HOSPADM

## 2023-04-24 RX ORDER — HALOPERIDOL 5 MG/ML
5 INJECTION INTRAMUSCULAR EVERY 6 HOURS PRN
Status: DISCONTINUED | OUTPATIENT
Start: 2023-04-24 | End: 2023-04-25 | Stop reason: HOSPADM

## 2023-04-24 RX ORDER — MECOBALAMIN 5000 MCG
5 TABLET,DISINTEGRATING ORAL NIGHTLY
Status: DISCONTINUED | OUTPATIENT
Start: 2023-04-24 | End: 2023-04-25 | Stop reason: HOSPADM

## 2023-04-24 RX ADMIN — FLUOXETINE 60 MG: 20 CAPSULE ORAL at 07:54

## 2023-04-24 RX ADMIN — OXYCODONE 10 MG: 5 TABLET ORAL at 22:56

## 2023-04-24 RX ADMIN — METHOCARBAMOL 500 MG: 500 TABLET ORAL at 07:54

## 2023-04-24 RX ADMIN — ACETAMINOPHEN 650 MG: 325 TABLET ORAL at 18:00

## 2023-04-24 RX ADMIN — CYCLOBENZAPRINE 5 MG: 10 TABLET, FILM COATED ORAL at 22:57

## 2023-04-24 RX ADMIN — RIVASTIGMINE TARTRATE 6 MG: 1.5 CAPSULE ORAL at 22:58

## 2023-04-24 RX ADMIN — ACETAMINOPHEN 650 MG: 325 TABLET ORAL at 13:23

## 2023-04-24 RX ADMIN — CYCLOBENZAPRINE 5 MG: 10 TABLET, FILM COATED ORAL at 13:24

## 2023-04-24 RX ADMIN — ACETAMINOPHEN 650 MG: 325 TABLET ORAL at 07:54

## 2023-04-24 RX ADMIN — POLYETHYLENE GLYCOL 3350 17 G: 17 POWDER, FOR SOLUTION ORAL at 07:54

## 2023-04-24 RX ADMIN — ATORVASTATIN CALCIUM 40 MG: 40 TABLET, FILM COATED ORAL at 07:55

## 2023-04-24 RX ADMIN — ATENOLOL 50 MG: 25 TABLET ORAL at 07:54

## 2023-04-24 RX ADMIN — OXYCODONE 10 MG: 5 TABLET ORAL at 10:53

## 2023-04-24 RX ADMIN — OXYCODONE 10 MG: 5 TABLET ORAL at 17:59

## 2023-04-24 RX ADMIN — RIVASTIGMINE TARTRATE 6 MG: 1.5 CAPSULE ORAL at 07:54

## 2023-04-24 RX ADMIN — ENOXAPARIN SODIUM 40 MG: 100 INJECTION SUBCUTANEOUS at 07:53

## 2023-04-24 RX ADMIN — OLANZAPINE 5 MG: 5 TABLET, FILM COATED ORAL at 22:57

## 2023-04-24 RX ADMIN — Medication 10 ML: at 07:55

## 2023-04-24 RX ADMIN — Medication 5 MG: at 22:56

## 2023-04-24 RX ADMIN — OXYCODONE 10 MG: 5 TABLET ORAL at 04:07

## 2023-04-24 ASSESSMENT — PAIN DESCRIPTION - DESCRIPTORS
DESCRIPTORS: ACHING

## 2023-04-24 ASSESSMENT — PAIN SCALES - PAIN ASSESSMENT IN ADVANCED DEMENTIA (PAINAD)
TOTALSCORE: 5
FACIALEXPRESSION: 2
FACIALEXPRESSION: 1
CONSOLABILITY: 1
BODYLANGUAGE: 0
BODYLANGUAGE: 1
TOTALSCORE: 5
FACIALEXPRESSION: 0
NEGVOCALIZATION: 1
NEGVOCALIZATION: 1
BODYLANGUAGE: 1
NEGVOCALIZATION: 1
BREATHING: 0
NEGVOCALIZATION: 1
FACIALEXPRESSION: 2
CONSOLABILITY: 1
CONSOLABILITY: 0
BREATHING: 2
CONSOLABILITY: 1
TOTALSCORE: 0
NEGVOCALIZATION: 0
BREATHING: 0
TOTALSCORE: 7
BODYLANGUAGE: 1
BODYLANGUAGE: 1
FACIALEXPRESSION: 2
BREATHING: 0
TOTALSCORE: 4
CONSOLABILITY: 1
BREATHING: 0

## 2023-04-24 ASSESSMENT — PAIN DESCRIPTION - LOCATION
LOCATION: HIP

## 2023-04-24 ASSESSMENT — PAIN SCALES - GENERAL
PAINLEVEL_OUTOF10: 7
PAINLEVEL_OUTOF10: 9
PAINLEVEL_OUTOF10: 5
PAINLEVEL_OUTOF10: 7

## 2023-04-24 ASSESSMENT — PAIN DESCRIPTION - ORIENTATION
ORIENTATION: LEFT

## 2023-04-24 NOTE — ACP (ADVANCE CARE PLANNING)
Advance Care Planning     General Advance Care Planning (ACP) Conversation  Code Clarification (not palcare referral)      Date of Conversation: 4/19/2023  Conducted with:  Healthcare Decision Maker: Next of Kin by law (only applies in absence of above) (name) Milton Cervantes, spouse   There does appear to be a HCPOA in this EMR though writer unable to obtain access. Healthcare Decision Maker:    Primary Decision Maker: Jaden Manzano - Spouse - [phone]    Primary Decision Maker: Juliette Vega - Child - [phone]  Click here to complete Healthcare Decision Makers including selection of the Healthcare Decision Maker Relationship (ie \"Primary\"). Today we documented Decision Maker(s) consistent with Legal Next of Kin hierarchy. Content/Action Overview:  Reviewed DNR/DNI and Spouse confirms current DNR status - completed forms on file (place new order if needed)    ventilation preferences  Call to Mrs. Aleah Persaud who was able to affirm pts DNR status but wasn't sure about intubation/MV wishes. With her permission, call placed to [facility], but had to leave message requesting return call. Writer has not heard back thus far ([phone]). Will continue efforts to verify pts wishes re: intubation/MV support.        Length of Voluntary ACP Conversation in minutes:  16 minutes                                                                                                                                                                  Carole Cordero RN

## 2023-04-24 NOTE — PLAN OF CARE
Problem: Discharge Planning  Goal: Discharge to home or other facility with appropriate resources  Outcome: Progressing     Problem: Safety - Adult  Goal: Free from fall injury  4/24/2023 1046 by Roseanna Irvin RN  Outcome: Progressing  4/24/2023 0259 by Keyla Hernandez, RN  Note: Bed in low position, wheels locked. Side rails up x2. Call light in reach and bed alarm remains activated. [employer] monitor in use. Problem: Skin/Tissue Integrity  Goal: Absence of new skin breakdown  Description: 1. Monitor for areas of redness and/or skin breakdown  2. Assess vascular access sites hourly  3. Every 4-6 hours minimum:  Change oxygen saturation probe site  4. Every 4-6 hours:  If on nasal continuous positive airway pressure, respiratory therapy assess nares and determine need for appliance change or resting period. Outcome: Progressing     Problem: ABCDS Injury Assessment  Goal: Absence of physical injury  Outcome: Progressing     Problem: Pain  Goal: Verbalizes/displays adequate comfort level or baseline comfort level  Outcome: Progressing     Problem: Safety - Medical Restraint  Goal: Remains free of injury from restraints (Restraint for Interference with Medical Device)  Description: INTERVENTIONS:  1. Determine that other, less restrictive measures have been tried or would not be effective before applying the restraint  2. Evaluate the patient's condition at the time of restraint application  3. Inform patient/family regarding the reason for restraint  4.  Q2H: Monitor safety, psychosocial status, comfort, nutrition and hydration  Outcome: Progressing

## 2023-04-24 NOTE — PLAN OF CARE
Problem: Safety - Adult  Goal: Free from fall injury  4/24/2023 0259 by Sheng Klein, RN  Note: Bed in low position, wheels locked. Side rails up x2. Call light in reach and bed alarm remains activated.   [employer] monitor in use.   4/23/2023 1857 by Isrrael Shah, RN  Outcome: Progressing

## 2023-04-25 VITALS
RESPIRATION RATE: 18 BRPM | HEART RATE: 67 BPM | WEIGHT: 200 LBS | BODY MASS INDEX: 27.09 KG/M2 | SYSTOLIC BLOOD PRESSURE: 146 MMHG | DIASTOLIC BLOOD PRESSURE: 76 MMHG | HEIGHT: 72 IN | OXYGEN SATURATION: 99 % | TEMPERATURE: 97.7 F

## 2023-04-25 LAB
ANION GAP SERPL CALCULATED.3IONS-SCNC: 16 MMOL/L (ref 3–16)
BASOPHILS # BLD: 0 K/UL (ref 0–0.2)
BASOPHILS NFR BLD: 0.6 %
BUN SERPL-MCNC: 21 MG/DL (ref 7–20)
CALCIUM SERPL-MCNC: 8.5 MG/DL (ref 8.3–10.6)
CHLORIDE SERPL-SCNC: 101 MMOL/L (ref 99–110)
CO2 SERPL-SCNC: 19 MMOL/L (ref 21–32)
CREAT SERPL-MCNC: 0.8 MG/DL (ref 0.8–1.3)
DEPRECATED RDW RBC AUTO: 13.6 % (ref 12.4–15.4)
EOSINOPHIL # BLD: 0.2 K/UL (ref 0–0.6)
EOSINOPHIL NFR BLD: 2.6 %
GFR SERPLBLD CREATININE-BSD FMLA CKD-EPI: >60 ML/MIN/{1.73_M2}
GLUCOSE SERPL-MCNC: 113 MG/DL (ref 70–99)
HCT VFR BLD AUTO: 25.5 % (ref 40.5–52.5)
HGB BLD-MCNC: 8.2 G/DL (ref 13.5–17.5)
LYMPHOCYTES # BLD: 0.6 K/UL (ref 1–5.1)
LYMPHOCYTES NFR BLD: 7.2 %
MCH RBC QN AUTO: 29.5 PG (ref 26–34)
MCHC RBC AUTO-ENTMCNC: 32.2 G/DL (ref 31–36)
MCV RBC AUTO: 91.6 FL (ref 80–100)
MONOCYTES # BLD: 0.8 K/UL (ref 0–1.3)
MONOCYTES NFR BLD: 8.4 %
NEUTROPHILS # BLD: 7.3 K/UL (ref 1.7–7.7)
NEUTROPHILS NFR BLD: 81.2 %
PLATELET # BLD AUTO: 367 K/UL (ref 135–450)
PMV BLD AUTO: 7.3 FL (ref 5–10.5)
POTASSIUM SERPL-SCNC: 4.7 MMOL/L (ref 3.5–5.1)
RBC # BLD AUTO: 2.78 M/UL (ref 4.2–5.9)
SARS-COV-2 RDRP RESP QL NAA+PROBE: NOT DETECTED
SODIUM SERPL-SCNC: 136 MMOL/L (ref 136–145)
WBC # BLD AUTO: 8.9 K/UL (ref 4–11)

## 2023-04-25 PROCEDURE — 97530 THERAPEUTIC ACTIVITIES: CPT

## 2023-04-25 PROCEDURE — 97110 THERAPEUTIC EXERCISES: CPT

## 2023-04-25 PROCEDURE — 85025 COMPLETE CBC W/AUTO DIFF WBC: CPT

## 2023-04-25 PROCEDURE — 6370000000 HC RX 637 (ALT 250 FOR IP): Performed by: NURSE PRACTITIONER

## 2023-04-25 PROCEDURE — 6370000000 HC RX 637 (ALT 250 FOR IP): Performed by: ORTHOPAEDIC SURGERY

## 2023-04-25 PROCEDURE — 36415 COLL VENOUS BLD VENIPUNCTURE: CPT

## 2023-04-25 PROCEDURE — 6370000000 HC RX 637 (ALT 250 FOR IP): Performed by: SPECIALIST/TECHNOLOGIST

## 2023-04-25 PROCEDURE — 6360000002 HC RX W HCPCS: Performed by: ORTHOPAEDIC SURGERY

## 2023-04-25 PROCEDURE — 87635 SARS-COV-2 COVID-19 AMP PRB: CPT

## 2023-04-25 PROCEDURE — 97535 SELF CARE MNGMENT TRAINING: CPT

## 2023-04-25 PROCEDURE — 6370000000 HC RX 637 (ALT 250 FOR IP)

## 2023-04-25 PROCEDURE — 80048 BASIC METABOLIC PNL TOTAL CA: CPT

## 2023-04-25 RX ORDER — BISACODYL 10 MG
10 SUPPOSITORY, RECTAL RECTAL ONCE
Status: COMPLETED | OUTPATIENT
Start: 2023-04-25 | End: 2023-04-25

## 2023-04-25 RX ADMIN — OXYCODONE 10 MG: 5 TABLET ORAL at 12:01

## 2023-04-25 RX ADMIN — ATORVASTATIN CALCIUM 40 MG: 40 TABLET, FILM COATED ORAL at 09:44

## 2023-04-25 RX ADMIN — ATENOLOL 50 MG: 25 TABLET ORAL at 09:44

## 2023-04-25 RX ADMIN — RIVASTIGMINE TARTRATE 6 MG: 1.5 CAPSULE ORAL at 09:56

## 2023-04-25 RX ADMIN — FLUOXETINE 60 MG: 20 CAPSULE ORAL at 09:43

## 2023-04-25 RX ADMIN — ENOXAPARIN SODIUM 40 MG: 100 INJECTION SUBCUTANEOUS at 09:43

## 2023-04-25 RX ADMIN — CYCLOBENZAPRINE 5 MG: 10 TABLET, FILM COATED ORAL at 13:16

## 2023-04-25 RX ADMIN — POLYETHYLENE GLYCOL 3350 17 G: 17 POWDER, FOR SOLUTION ORAL at 09:43

## 2023-04-25 RX ADMIN — ACETAMINOPHEN 650 MG: 325 TABLET ORAL at 13:16

## 2023-04-25 RX ADMIN — CYCLOBENZAPRINE 5 MG: 10 TABLET, FILM COATED ORAL at 09:44

## 2023-04-25 RX ADMIN — BISACODYL 10 MG: 10 SUPPOSITORY RECTAL at 12:01

## 2023-04-25 RX ADMIN — ACETAMINOPHEN 650 MG: 325 TABLET ORAL at 09:43

## 2023-04-25 ASSESSMENT — PAIN DESCRIPTION - DESCRIPTORS: DESCRIPTORS: ACHING

## 2023-04-25 ASSESSMENT — PAIN SCALES - PAIN ASSESSMENT IN ADVANCED DEMENTIA (PAINAD)
TOTALSCORE: 4
TOTALSCORE: 4
CONSOLABILITY: 0
NEGVOCALIZATION: 1
BODYLANGUAGE: 1
FACIALEXPRESSION: 1
FACIALEXPRESSION: 1
CONSOLABILITY: 0
BREATHING: 1
BODYLANGUAGE: 1
NEGVOCALIZATION: 1
BREATHING: 1

## 2023-04-25 ASSESSMENT — PAIN SCALES - GENERAL
PAINLEVEL_OUTOF10: 7
PAINLEVEL_OUTOF10: 4

## 2023-04-25 ASSESSMENT — PAIN DESCRIPTION - LOCATION: LOCATION: HIP

## 2023-04-25 ASSESSMENT — PAIN DESCRIPTION - ORIENTATION: ORIENTATION: LEFT

## 2023-04-25 NOTE — DISCHARGE SUMMARY
Hospital Medicine Discharge Summary    Patient ID: Tello Walls      Patient's PCP: Wiley Fox    Admit Date: 4/19/2023     Discharge Date:   4/25/23    Admitting Provider: Dina Blankenship MD     Discharge Provider: ADRIANA Valero - CNP     Discharge Diagnoses: Active Hospital Problems    Diagnosis     Closed left hip fracture, initial encounter ([facility] [address] [S72.002A]     Closed displaced comminuted fracture of shaft of left femur ([facility] [address] Eleni Mayer        The patient was seen and examined on day of discharge and this discharge summary is in conjunction with any daily progress note from day of discharge. Hospital Course:     66 y.o. male who presented to [facility] with fall. PMHx significant for Dementia, HTN. He reportedly fell at his nursing home and landed between some furniture, resulting in injury to left hip. Presented to the ED for evaluation and found to have left hip fracture. He is a limited historian but does complain about being dizzy. He does not recall why or how he fell. Orthopedics was consulted. Mechanical fall     Comminuted, displaced left proximal femoral shaft fracture  -Likely due to above  -Per x-ray on arrival  -Likely a pathologic osteoporotic fracture, and a healthy bone would not have broken   -Ortho surgery consulted  -Underwent ORIF and gamma nail/cable on 4/20 with Dr. Lisa Esteban  -PT/OT  -Pain management as needed, Bowel regimen     Leukocytosis-resolved  -Likely stress response  -Improving today  -Monitor     Essential hypertension-controlled  -Continue atenolol  -Monitor     Dementia, with behavioral disturbances, post op delirium  -Supportive care  -Continued home dose exelon, zyprexa  -Required restraints and IM ativan 4/23, still in wrist restraints-4/24. Out of restraints, mood stable 4/25. DC plan: F/U with Dr Roxana Zelaya in 10-14 days. Call [facility] for appointment time and date for follow up at [phone].

## 2023-04-25 NOTE — CARE COORDINATION
CASE MANAGEMENT DISCHARGE SUMMARY      Discharge to: The [facility] completed: [facility] Exemption Notification (HENS) completed: Yes    IMM given: (date) 4/24/23      Transportation: [employer] explained to [place]. Pt/family voice no agency preference. Agency used: [employer]   time: 1530   Ambulance form completed: Yes    Confirmed discharge plan with: Marcelinanadiya Bardales @ [facility]     Patient: yes     Family:  yes- spouse @ [facility] [place], name:  ROBERTO/AVS faxed   Phone number for report to facility: [phone]     RN, name: Salina Mckenziesherry    Note: Discharging nurse to complete ROBERTO, reconcile AVS, and place final copy with patient's discharge packet. RN to ensure that written prescriptions for  Level II medications are sent with patient to the facility as per protocol.       Hi Hodgson RN

## 2023-04-25 NOTE — PROGRESS NOTES
Occupational Therapy  Facility/Department: [facility] C5 - MED SURG/ORTHO  Daily Treatment Note  NAME: Trilby Shone  : 1945  MRN: [id number]    Date of Service: 2023    Discharge Recommendations:  Subacute/Skilled Nursing Facility   Therapy discharge recommendations take into account each patient's current medical complexities and are subject to input/oversight from the patient's healthcare team.   Barriers to Home Discharge:   [] Steps to access home entry or bed/bath:   [x] Unable to transfer, ambulate, or propel wheelchair household distances without assist   [] Limited available assist at home upon discharge    [] Patient or family requests d/c to post-acute facility    [x] Poor cognition/safety awareness for d/c to home alone   [] Unable to maintain ordered weight bearing status    [] Patient with salient signs of long-standing immobility   [x] Other: Decreased IND with ADLs    Patient Diagnosis(es): The primary encounter diagnosis was Closed displaced subtrochanteric fracture of left femur, initial encounter ([facility] [address] Diagnoses of Fall, initial encounter, Leukocytosis, unspecified type, and Prolonged Q-T interval on ECG were also pertinent to this visit. Assessment         Pt did not demo agitation this session. Supine>sit Mod Ax1 + Max Ax1, sit>supine Max Ax2. Pt sat EOB for approx 35 mins with Min A for sitting balance while eating breakfast and completing grooming tasks. Max A for eating, oral care, and toileting. Mod A to wash face. Pt limited by lethargy and poor cognition. Pt continues to require skilled OT services to address decreased strength and safety/IND with ADLs and fxl transfers. Co-tx collaboration with PT this date to safely meet goals and promote better occupational performance outcomes than 1:1 session.      Activity Tolerance: Treatment limited secondary to agitation;Treatment limited secondary to decreased cognition;Patient limited by pain  Discharge Recommendations:
Occupational Therapy  Facility/Department: [facility] C5 - MED SURG/ORTHO  Daily Treatment Note  NAME: Rajesh Tong  : 1945  MRN: [id number]    Date of Service: 2023    Discharge Recommendations:  Subacute/Skilled Nursing Facility     Therapy discharge recommendations take into account each patient's current medical complexities and are subject to input/oversight from the patient's healthcare team.   Barriers to Home Discharge:   [] Steps to access home entry or bed/bath:   [x] Unable to transfer, ambulate, or propel wheelchair household distances without assist   [] Limited available assist at home upon discharge    [] Patient or family requests d/c to post-acute facility    [x] Poor cognition/safety awareness for d/c to home alone   [] Unable to maintain ordered weight bearing status    [] Patient with salient signs of long-standing immobility   [] Other: Decreased IND with ADLs    Patient Diagnosis(es): The primary encounter diagnosis was Closed displaced subtrochanteric fracture of left femur, initial encounter ([facility] [address] Diagnoses of Fall, initial encounter, Leukocytosis, unspecified type, and Prolonged Q-T interval on ECG were also pertinent to this visit. Assessment         Pt more alert during today's session compared to yesterday's session. Req. Mod-Max Ax2 for bed mobility, Mod-Max Ax2 for sit to stands. Sat EOB with SBA for approx 25 mins while eating breakfast and completing grooming tasks. Pt continues to require skilled OT services to address decreased strength, endurance, and IND with ADLs and fxl mobility. Co-tx collaboration this date to safely meet goals and promote better occupational performance outcomes with in a co-treatment than 1:1 session. Activity Tolerance: Patient tolerated treatment well;Treatment limited secondary to decreased cognition;Patient limited by pain; Patient limited by endurance  Discharge Recommendations: [place] [address]
Patient moving around in bed and pulling at his attends. Picking at his hip incision, wrist restraints remain on. Occasionally yelling out. Repositioned in bed, attends changed and new mepilex placed on incision. Bilateral soft wrist restraints remain in place. Attempt to reorient without success.
Patient out of restrains as of 12:30 on 4/24. Patient laying in bed, quietly resting. RN will continue to monitor patient.
Physical Therapy  Facility/Department: [place] [address] - MED SURG/ORTHO  Daily Treatment Note  NAME: Nitin Espinosa  : 1945  MRN: [id number]    Date of Service: 2023    Discharge Recommendations:  Subacute/Skilled Nursing Facility   PT Equipment Recommendations  Equipment Needed: No  Other: defer to next level of care. [facility] 6 Clicks Inpatient Mobility:  AM-PAC Basic Mobility - Inpatient   How much help is needed turning from your back to your side while in a flat bed without using bedrails?: A Lot  How much help is needed moving from lying on your back to sitting on the side of a flat bed without using bedrails?: A Lot  How much help is needed moving to and from a bed to a chair?: Total  How much help is needed standing up from a chair using your arms?: A Lot  How much help is needed walking in hospital room?: Total  How much help is needed climbing 3-5 steps with a railing?: Total  AM-PAC Inpatient Mobility Raw Score : 9  AM-[facility] Inpatient T-Scale Score : 30.55  Mobility Inpatient CMS 0-100% Score: 81.38  Mobility Inpatient CMS G-Code Modifier : CM    Patient Diagnosis(es): The primary encounter diagnosis was Closed displaced subtrochanteric fracture of left femur, initial encounter ([facility] [address] Diagnoses of Fall, initial encounter, Leukocytosis, unspecified type, and Prolonged Q-T interval on ECG were also pertinent to this visit. Assessment   Assessment: Pt required mod/max x 2 A for supine <> sit and Sit<>stand (RW). Pt was unable to [facility] and take a step today. He sat EOB with superv A for ther ex and ADLs w/ OT. Pt is recommended for con't skilled PTand SNF at D/C. Activity Tolerance: Patient tolerated treatment well;Treatment limited secondary to decreased cognition;Patient limited by pain; Patient limited by endurance  Equipment Needed: No  Other: defer to next level of care.      Plan    Physcial Therapy Plan  General Plan: 1 time a day 7 days a week  Current Treatment Recommendations:
Physical Therapy  Facility/Department: [facility] C5 - MED SURG/ORTHO  Daily Treatment Note  NAME: Tamara Villagomez  : 1945  MRN: [id number]    Date of Service: 2023    Discharge Recommendations:  Subacute/Skilled Nursing Facility   PT Equipment Recommendations  Equipment Needed: No  Other: defer to next level of care. AM-PAC Basic Mobility - Inpatient   How much help is needed turning from your back to your side while in a flat bed without using bedrails?: A Lot  How much help is needed moving from lying on your back to sitting on the side of a flat bed without using bedrails?: A Lot  How much help is needed moving to and from a bed to a chair?: Total  How much help is needed standing up from a chair using your arms?: A Lot  How much help is needed walking in hospital room?: Total  How much help is needed climbing 3-5 steps with a railing?: Total  AM-PAC Inpatient Mobility Raw Score : 9  AM-[facility] Inpatient T-Scale Score : 30.55  Mobility Inpatient CMS 0-100% Score: 81.38  Mobility Inpatient CMS G-Code Modifier : CM     Patient Diagnosis(es): The primary encounter diagnosis was Closed displaced subtrochanteric fracture of left femur, initial encounter ([facility] [address] Diagnoses of Fall, initial encounter, Leukocytosis, unspecified type, and Prolonged Q-T interval on ECG were also pertinent to this visit. Assessment   Assessment: Pt restrainted BUE on arrival due to agitation overnight. PT very tired, required many cues to keep eyes open. Pt sat EOB  with mod/max AOf 2 supine <> sit. He sat EOB with min/CGA for balance while eating with A from OT. Pt is recommended for con't skilled PTand SNF at D/C. Activity Tolerance: Treatment limited secondary to agitation;Treatment limited secondary to decreased cognition;Patient limited by pain  Equipment Needed: No  Other: defer to next level of care.      Plan    Physcial Therapy Plan  General Plan: 1 time a day 7 days a week  Current Treatment Recommendations:
RN called report to [facility] and spoke to [employer]. All questions and concerns answered. RN left name and callback number if questions arise upon arrival of pt.
Resting quietly with eyes closed. Respirations easy and regular.
137 04/22/2023 06:13 AM    K 4.4 04/22/2023 06:13 AM     04/22/2023 06:13 AM    CO2 26 04/22/2023 06:13 AM    BUN 27 04/22/2023 06:13 AM    CREATININE 1.1 04/22/2023 06:13 AM    GLUCOSE 116 04/22/2023 06:13 AM    CALCIUM 8.1 04/22/2023 06:13 AM        EBL less than 100mL    Assessment:      left hip intramedullary nailing with cables, POD 4. DOS 4/20/23 by Dr Mohini Voss     Acute blood loss anemia - expected after surgery. Will monitor Hgb, 8.6 today, 12.5 on admission. Transfuse if less than 7      Plan:      1:  WBAT to the LLE with assistive device  2:  Continue Deep venous thrombosis prophylaxis- Lovenox 40mg daily  3:  Continue Pain Control  4: two doses IV ancef completed post op  5: PT/OT recommending SNF  6: Discharge pending medical stability, DCP updated, pain prescription in soft chart.      Yolanda Tang
midline. Respiratory: Normal respiratory effort. Clear to auscultation, bilaterally without Rales/Wheezes/Rhonchi. Room air  Cardiovascular: Regular rate and rhythm with normal S1/S2 without murmurs, rubs or gallops. Abdomen: Soft, non-tender, non-distended with normal bowel sounds. Musculoskeletal: No clubbing, cyanosis or edema bilaterally. Full range of motion without deformity. Skin: Skin color, texture, turgor normal. No rashes or lesions. Neurologic: Neurovascularly intact without any focal sensory/motor deficits. Cranial nerves: II-XII intact, grossly non-focal.  Psychiatric: Alert and oriented to self. Capillary Refill: Brisk, 3 seconds, normal   Peripheral Pulses: +2 palpable, equal bilaterally       Labs:   Recent Labs     04/22/23 0613 04/23/23 0623 04/24/23 0651   WBC 12.2* 14.4* 10.9   HGB 8.6* 7.9* 9.6*   HCT 25.3* 23.5* 29.0*    292 334       Recent Labs     04/22/23 0613      K 4.4      CO2 26   BUN 27*   CREATININE 1.1   CALCIUM 8.1*       No results for input(s): AST, ALT, BILIDIR, BILITOT, ALKPHOS in the last 72 hours. No results for input(s): INR in the last 72 hours. No results for input(s): Patricia Daisy in the last 72 hours. Urinalysis:      Lab Results   Component Value Date/Time    NITRU Negative 04/19/2023 12:07 PM    [address] >100 01/06/2022 07:40 PM    BACTERIA 4+ 01/06/2022 07:40 PM    RBCUA 5-10 01/06/2022 07:40 PM    BLOODU Negative 04/19/2023 12:07 PM    SPECGRAV >=1.030 04/19/2023 12:07 PM    GLUCOSEU Negative 04/19/2023 12:07 PM       Radiology:  XR FEMUR LEFT (MIN 2 VIEWS)   Final Result   Images acquired for localization during open reduction and internal fixation   of a left femoral fracture as described. FLUORO FOR SURGICAL PROCEDURES   Final Result      XR CHEST PORTABLE   Final Result   No acute cardiopulmonary findings         XR FEMUR LEFT (MIN 2 VIEWS)   Final Result   Left proximal femur fracture as described above.

## 2023-04-25 NOTE — PLAN OF CARE
Problem: Discharge Planning  Goal: Discharge to home or other facility with appropriate resources  4/25/2023 1402 by Zari Blackburn RN  Outcome: Progressing  4/25/2023 0050 by Marv Campos RN  Outcome: Progressing     Problem: Safety - Adult  Goal: Free from fall injury  4/25/2023 1402 by Zari Blackburn RN  Outcome: Progressing  4/25/2023 0050 by Marv Campos RN  Outcome: Progressing     Problem: Skin/Tissue Integrity  Goal: Absence of new skin breakdown  Description: 1. Monitor for areas of redness and/or skin breakdown  2. Assess vascular access sites hourly  3. Every 4-6 hours minimum:  Change oxygen saturation probe site  4. Every 4-6 hours:  If on nasal continuous positive airway pressure, respiratory therapy assess nares and determine need for appliance change or resting period. 4/25/2023 1402 by Zari Blackburn RN  Outcome: Progressing  4/25/2023 0050 by Marv Campos RN  Outcome: Progressing     Problem: ABCDS Injury Assessment  Goal: Absence of physical injury  4/25/2023 1402 by Zari Blackburn RN  Outcome: Progressing  4/25/2023 0050 by Marv Campos RN  Outcome: Progressing     Problem: Pain  Goal: Verbalizes/displays adequate comfort level or baseline comfort level  4/25/2023 1402 by Zari Blackburn RN  Outcome: Progressing  4/25/2023 0050 by Marv Campos RN  Outcome: Progressing     Problem: Safety - Medical Restraint  Goal: Remains free of injury from restraints (Restraint for Interference with Medical Device)  Description: INTERVENTIONS:  1. Determine that other, less restrictive measures have been tried or would not be effective before applying the restraint  2. Evaluate the patient's condition at the time of restraint application  3. Inform patient/family regarding the reason for restraint  4.  Q2H: Monitor safety, psychosocial status, comfort, nutrition and hydration  4/25/2023 1402 by Zair Blackburn RN  Outcome: Progressing  4/25/2023 0050 by Marv Campos RN  Outcome: Progressing

## 2023-04-28 ENCOUNTER — TELEPHONE (OUTPATIENT)
Dept: ORTHOPEDIC SURGERY | Age: 78
End: 2023-04-28

## 2023-04-28 NOTE — TELEPHONE ENCOUNTER
Request from facility to remove staples. Patient DOS 4/20/2023, FU scheduled 5/30/2023. OKAY per SMA to remove staples 2 week PO so long as incision site is healed and no infection concerns.  Spoke to nurse Kristin Moran to give verbal orders

## 2023-05-22 ENCOUNTER — TELEPHONE (OUTPATIENT)
Dept: ORTHOPEDIC SURGERY | Age: 78
End: 2023-05-22

## 2023-06-24 ENCOUNTER — APPOINTMENT (OUTPATIENT)
Dept: CT IMAGING | Age: 78
End: 2023-06-24
Payer: MEDICARE

## 2023-06-24 ENCOUNTER — HOSPITAL ENCOUNTER (EMERGENCY)
Age: 78
Discharge: HOME | End: 2023-06-24
Payer: MEDICARE

## 2023-06-24 VITALS
OXYGEN SATURATION: 96 % | HEART RATE: 71 BPM | TEMPERATURE: 98.1 F | RESPIRATION RATE: 16 BRPM | HEIGHT: 72 IN | DIASTOLIC BLOOD PRESSURE: 81 MMHG | SYSTOLIC BLOOD PRESSURE: 162 MMHG | BODY MASS INDEX: 27.09 KG/M2 | WEIGHT: 200 LBS

## 2023-06-24 DIAGNOSIS — S09.90XA INJURY OF HEAD, INITIAL ENCOUNTER: ICD-10-CM
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
## 2023-06-24 PROCEDURE — 70450 CT HEAD/BRAIN W/O DYE: CPT

## 2023-06-24 PROCEDURE — 99284 EMERGENCY DEPT VISIT MOD MDM: CPT

## 2023-06-24 PROCEDURE — 72125 CT NECK SPINE W/O DYE: CPT

## 2023-06-24 ASSESSMENT — PAIN SCALES - GENERAL: PAINLEVEL_OUTOF10: 0

## 2023-06-24 ASSESSMENT — PAIN - FUNCTIONAL ASSESSMENT
PAIN_FUNCTIONAL_ASSESSMENT: 0-10
PAIN_FUNCTIONAL_ASSESSMENT: 0-10

## 2023-06-24 NOTE — ED PROVIDER NOTES
Physician in the absence of a Cardiologist.  Please see their note for interpretation of EKG. LABS  Labs Reviewed - No data to display  When ordered, only abnormal lab results are displayed. All other labs were within normal range or not returned as of this dictation. RADIOLOGY  Non-plain film images such as CT, U/S, and MRI are read by the radiologist.  Plain radiographic images are visualized and preliminarily interpreted by the ED Provider with the below findings:     Interpretation per the Radiologist below, if available at the time of this note:  CT CERVICAL SPINE WO CONTRAST   Final Result   No acute abnormality of the head and cervical spine. CT HEAD WO CONTRAST   Final Result   No acute abnormality of the head and cervical spine. PROCEDURES  Unless otherwise noted below, none. ED COURSE/DDx/MDM  History obtained from:  Wife and EMS    Vitals:  Vitals:    06/24/23 1603 06/24/23 1738   BP: (!) 152/70 (!) 162/81   Pulse: 67 71   Resp: 16 16   Temp: 98.1 °F (36.7 °C)    TempSrc: Oral    SpO2: 97% 96%   Weight: 200 lb (90.7 kg)    Height: 6' (1.829 m)      Patient received following medications in ED:  Medications - No data to display  Sepsis Consideration:  Is this patient to be included in the SEP-1 Core Measure due to severe sepsis or septic shock? No   Exclusion criteria - the patient is NOT to be included for SEP-1 Core Measure due to: Infection is not suspected    Records Reviewed:   Brief chart review performed without relevant records identified. .    Chronic conditions affecting care:   Alzheimer's disease, hyperlipidemia, hypertension, arthritis. has a past medical history of ADHD (attention deficit hyperactivity disorder), Alzheimer disease ([facility] [address] Cancer ([facility] [address] Hyperlipidemia, Hypertension, IFG (impaired fasting glucose), OCD (obsessive compulsive disorder), Osteoarthritis, Renal insufficiency, and TIA (transient ischemic attack).     Social Determinants:   Patient with

## 2023-06-24 NOTE — ED NOTES
Patient identified as a positive fall risk on the ED triage fall screening. Patient placed in fall precautions which includes:  yellow fall risk bracelet on wrist and yellow socks on feet. Patient instructed on importance of not getting out of bed or ambulating without assistance for safety. Pt verbalized understanding.        Jaida Owusu RN  06/24/23 5740

## 2023-06-24 NOTE — ED NOTES
Pt to ct and back. Placed back ion bed with family at beside.  Call 9gt and fall pre\cautions maintained     Adam David RN  06/24/23 2674

## 2023-06-24 NOTE — ED NOTES
Pt alert to person and place.  Denies any complaints at this time     Eleuterio Nielsen RN  06/24/23 5447

## 2023-06-24 NOTE — ED NOTES
Pt ok to d/c to home. Pt son given d/c instructions. Pt verbalized understating including Rx and follow up care. Pt wheeled to lobby for ride home.  0 s/s of distress at time of d/c.          Cherrie Calzada RN  06/24/23 9153

## 2024-01-12 ENCOUNTER — HOSPITAL ENCOUNTER (EMERGENCY)
Age: 79
Discharge: OTHER INSTITUTION | End: 2024-01-12
Attending: EMERGENCY MEDICINE
Payer: MEDICARE

## 2024-01-12 ENCOUNTER — APPOINTMENT (OUTPATIENT)
Dept: CT IMAGING | Age: 79
End: 2024-01-12
Payer: MEDICARE

## 2024-01-12 VITALS
HEART RATE: 65 BPM | SYSTOLIC BLOOD PRESSURE: 145 MMHG | OXYGEN SATURATION: 98 % | BODY MASS INDEX: 27.09 KG/M2 | DIASTOLIC BLOOD PRESSURE: 79 MMHG | WEIGHT: 200 LBS | HEIGHT: 72 IN | TEMPERATURE: 98.4 F | RESPIRATION RATE: 18 BRPM
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
## 2024-01-12 DIAGNOSIS — U07.1 COVID-19: ICD-10-CM

## 2024-01-12 LAB
FLUAV RNA RESP QL NAA+PROBE: NOT DETECTED
FLUBV RNA RESP QL NAA+PROBE: NOT DETECTED
SARS-COV-2 RNA RESP QL NAA+PROBE: DETECTED

## 2024-01-12 PROCEDURE — 87636 SARSCOV2 & INF A&B AMP PRB: CPT

## 2024-01-12 PROCEDURE — 99284 EMERGENCY DEPT VISIT MOD MDM: CPT

## 2024-01-12 PROCEDURE — 70450 CT HEAD/BRAIN W/O DYE: CPT

## 2024-01-12 ASSESSMENT — PAIN SCALES - GENERAL: PAINLEVEL_OUTOF10: 0

## 2024-01-12 ASSESSMENT — PAIN - FUNCTIONAL ASSESSMENT: PAIN_FUNCTIONAL_ASSESSMENT: 0-10

## 2024-01-12 ASSESSMENT — LIFESTYLE VARIABLES
HOW OFTEN DO YOU HAVE A DRINK CONTAINING ALCOHOL: NEVER
HOW MANY STANDARD DRINKS CONTAINING ALCOHOL DO YOU HAVE ON A TYPICAL DAY: PATIENT DOES NOT DRINK

## 2024-01-12 NOTE — ED NOTES
Patient identified as a positive fall risk on the ED triage fall screening.  Patient placed in fall precautions which includes:  yellow fall risk bracelet on wrist and yellow socks on feet. Patient instructed on importance of not getting out of bed or ambulating without assistance for safety.  Pt verbalized understanding.

## 2024-01-12 NOTE — ED NOTES
Writer reviewed discharge instructions. patient verbalized understanding. Patient's IV removed with no complications with dressing in place. Patient stable, on stretcher, no signs/ symptoms of acute distress, respirations unlabored, and in ambulance. Patient discharged with documented belongings.

## 2024-01-12 NOTE — DISCHARGE INSTRUCTIONS
You tested positive for covid 19. Ct head w/o shows no bleed.   Recommend checking with your pharmacist on interactions of paxlovid with your current medications. Follow up with your doctors.   If persistent or worsening symptoms, or if you have any concerns, return to ED immediately.

## 2024-01-12 NOTE — ED PROVIDER NOTES
[facility]  ED    EMERGENCY DEPARTMENT ENCOUNTER        Patient Name: Brent Mina  MRN: [id number]  Birthdate 1945  Date of evaluation: 1/12/2024  PCP: Aravind Reed  Note Started: 1:39 PM EST 1/12/24    CHIEF COMPLAINT       Fall (Pt is from the [facility] in the memory care unit. EMS reports pt fell at 4am, Unwitnessed fall.  )      HISTORY OF PRESENT ILLNESS: 1 or more Elements       Brent Mina is a 78 y.o. male who is sent in from [facility] memory care unit for evaluation after a unwitnessed fall.  Patient denies having any complaints.  He says he is not sure why he was sent to the ER.  Denies having a headache, neck pain, back pain, chest pain, shortness of breath, or abdominal pain.  He is following commands and moving all extremities.  No caregivers present at bedside to provide further history.    No other complaints, modifying factors or associated symptoms.     History obtained by the patient unless stated otherwise as above on HPI.   No limitations unless specified as above on HPI.     Past medical history:   Past Medical History:   Diagnosis Date    ADHD (attention deficit hyperactivity disorder)     Alzheimer disease (HCC)     Cancer (HCC)     prostate    Hyperlipidemia     Hypertension     IFG (impaired fasting glucose)     OCD (obsessive compulsive disorder)     Osteoarthritis     Renal insufficiency     TIA (transient ischemic attack)        Past surgical history:   Past Surgical History:   Procedure Laterality Date    COLONOSCOPY  2004    EYE SURGERY      FEMUR FRACTURE SURGERY Left 4/20/2023    LEFT HIP GAMMA NAILING WITH CABLES                  GUERITA performed by Niko Higgins MD at [facility] OR    INTRACAPSULAR CATARACT EXTRACTION Right 10/21/2019    PHACOEMULSIFICATION OF CATARACT RIGHT EYE WITH INTRAOCULAR LENS IMPLANT performed by Jose Che MD at [facility] OR    INTRACAPSULAR CATARACT EXTRACTION Left 10/31/2019    PHACOEMULSIFICATION OF  normal limits       When ordered only abnormal lab results are displayed. All other labs were within normal range or not returned as of this dictation.    RADIOLOGY:     Non-plain film images such as CT, Ultrasound and MRI are read by the radiologist. Plain radiographic images personally reviewed.     Interpretation per the Radiologist below, if available at the time of this note:  CT HEAD WO CONTRAST   Final Result   No acute intracranial abnormality.      Moderate ventriculomegaly likely related to central atrophy. Normal pressure   hydrocephalus is not excluded.           No results found.      Bedside Ultrasound, as interpreted by me, if performed:    No results found.    PROCEDURES     Unless otherwise noted below, none     Procedures    CRITICAL CARE TIME     I personally spent a total of 0 minutes of critical care time in obtaining history, performing a physical exam, bedside monitoring of interventions, collecting and interpreting tests and discussion with consultants but excluding time spent performing procedures, treating other patients and teaching time.                                                                                                         EMERGENCY DEPARTMENT COURSE and DIFFERENTIAL DIAGNOSIS/MDM:     Patient seen and evaluated. At presentation, patient was awake, alert, afebrile, increased ago, and satting 96% on room air.  Called the memory care.  For further information.  Per the memory care, she had a witnessed fall and was found on the floor at 4 AM.  Initially, there was no evidence of injury.  However, throughout the day, patient did not seem to be acting his usual self and was not as \"bubbly\" and sent in for evaluation.  CT head without obtained to assess for intracranial bleed.  COVID and influenza obtained.  He tested positive for COVID-19.  CT head without shows no acute intracranial bleed.  Discussed the results of the workup with patient and wife.  Given prescription of

## 2024-08-01 NOTE — PROGRESS NOTES
Hospitalist Progress Note      PCP: Brendan Vogt    Date of Admission: 1/6/2022    Chief Complaint: Fatigue    Hospital Course:   68 y. o. male, with PMH of HTN, HLD and dementia, who presented to [address] [place] [address] with fatigue. The patient is a poor historian d/t dementia. History obtained from the patient and review of EMR.  The patient was brought into the emergency room due to being more fatigued over the last few days and taking 6-hour naps which is unusual for him. [facility] was found to have a UTI.  He was going to be prescribed antibiotics and sent home, however upon discharge the patient's heart rate was noticed to be elevated at 140.  At that time a twelve-lead EKG was obtained and the patient was found to be in atrial fibrillation.  This is a new onset atrial fibrillation, therefore the patient will be admitted for further evaluation and treatment. [facility] was given a 10 mg IV bolus of diltiazem followed by a diltiazem drip. Subjective:   Patient is lying in the bed comfortable continues to be confused denies any chest pain no shortness of breath currently in restraints.       Medications:  Reviewed    Infusion Medications    sodium chloride      sodium chloride 75 mL/hr at 01/08/22 0428     Scheduled Medications    enoxaparin  1 mg/kg SubCUTAneous Q12H    cefTRIAXone (ROCEPHIN) IV  1,000 mg IntraVENous Q24H    aspirin  81 mg Oral Daily    atenolol  50 mg Oral Daily    atorvastatin  40 mg Oral Nightly    FLUoxetine  60 mg Oral Daily    methylphenidate  10 mg Oral 4x daily    traZODone  100 mg Oral Nightly    sodium chloride flush  5-40 mL IntraVENous 2 times per day     PRN Meds: sodium chloride flush, sodium chloride, ondansetron **OR** ondansetron, polyethylene glycol, acetaminophen **OR** acetaminophen, perflutren lipid microspheres      Intake/Output Summary (Last 24 hours) at 1/9/2022 0916  Last data filed at 1/9/2022 7370  Gross per 24 hour   Intake 1313.74 ml   Output 750 ml   Net 563.74 [de-identified] : I had a lengthy discussion with the patient regarding their current condition. We discussed the treatment options including operative and nonoperative management. At this time I recommended a course of formal physical therapy again.  I am refilling her meloxicam.  I ordered an MRI that she will obtain in the next couple of weeks if she does not notice improvement.  She will follow-up after that is completed.  All questions were answered. ml       Physical Exam Performed:    BP (!) 153/75   Pulse 55   Temp 98 °F (36.7 °C) (Oral)   Resp 18   Ht 5' 11\" (1.803 m)   Wt 200 lb (90.7 kg)   SpO2 98%   BMI 27.89 kg/m²     General appearance: No apparent distress, appears stated age and cooperative. HEENT: Pupils equal, round, and reactive to light. Conjunctivae/corneas clear. Neck: Supple, with full range of motion. No jugular venous distention. Trachea midline. Respiratory:  Normal respiratory effort. Clear to auscultation, bilaterally without Rales/Wheezes/Rhonchi. Cardiovascular: Regular rate and rhythm with normal S1/S2 without murmurs, rubs or gallops. Abdomen: Soft, non-tender, non-distended with normal bowel sounds. Musculoskeletal: No clubbing, cyanosis or edema bilaterally. Full range of motion without deformity. Skin: Skin color, texture, turgor normal.  No rashes or lesions. Neurologic:  Neurovascularly intact without any focal sensory/motor deficits.  Cranial nerves: II-XII intact, grossly non-focal.  Psychiatric: Alert and oriented, thought content appropriate, normal insight  Capillary Refill: Brisk,3 seconds, normal   Peripheral Pulses: +2 palpable, equal bilaterally       Labs:   Recent Labs     01/06/22 1838 01/08/22  1313   WBC 4.7 3.4*   HGB 13.4* 13.8   HCT 39.7* 41.0    154     Recent Labs     01/06/22  1838 01/07/22  0654 01/08/22  1313   * 135* 133*   K 4.2 3.8 4.0   CL 99 100 100   CO2 24 20* 21   BUN 17 18 20   CREATININE 1.4* 1.4* 1.2   CALCIUM 9.2 8.7 8.1*     Recent Labs     01/06/22 1838   AST 13*   ALT 8*   BILITOT 0.9   ALKPHOS 98     Recent Labs     01/07/22  0654   INR 1.15*     Recent Labs     01/06/22 1838   TROPONINI <0.01       Urinalysis:      Lab Results   Component Value Date    NITRU POSITIVE 01/06/2022    WBCUA >100 01/06/2022    BACTERIA 4+ 01/06/2022    RBCUA 5-10 01/06/2022    BLOODU MODERATE 01/06/2022    SPECGRAV >=1.030 01/06/2022    GLUCOSEU Negative 01/06/2022 Radiology:  XR CHEST PORTABLE   Final Result   No acute cardiopulmonary disease. Suboptimal rotated portable study obtained at low lung volumes. When the   patient is able, a follow-up inspiratory PA and lateral examination the chest   is recommended. Assessment/Plan:    Active Hospital Problems    Diagnosis     New onset atrial fibrillation (HCC) [I48.91]     Acute cystitis [N30.00]     Essential hypertension [I10]     Hyperlipidemia [E78.5]      1. New onset of atrial fibrillation. Cardiology consulted patient return to normal sinus rhythm Cardizem drip has been discontinued 2D echo with normal EF. On treatment dose of Lovenox. Patient to discharge home on event monitor per cardiology. 2.  COVID-19 tested positive on 1/7/2022 difficult to determine symptoms onset as he is a poor historian continue with supportive care. He is vaccinated with Materna x2 but no record of booster. 3.  UTI with a E. coli sensitivity noted continue with IV Rocephin day #4 today. 4.  Acute kidney injury on admission creatinine 1.4 due to poor appetite baseline creatinine 1.1 continue with IV fluids check BMP today. 5.  Dementia supportive care. Consult psych as continues to require restraint. Continue with the Prozac. 6.  Hypertension on atenolol. 7.  Hyperlipidemia on statin. 8.  Anemia stable no evidence of bleeding. 9.  Consult physical Occupational Therapy, consult palliative care. DVT Prophylaxis: Lovenox subcu  Diet: ADULT DIET;  Regular  Code Status: Full Code    PT/OT Eval Status: Consulted    Dispo -may need a placement    Willem Akhtar MD

## 2024-08-16 ENCOUNTER — APPOINTMENT (OUTPATIENT)
Dept: CT IMAGING | Age: 79
End: 2024-08-16
Payer: MEDICARE

## 2024-08-16 ENCOUNTER — HOSPITAL ENCOUNTER (INPATIENT)
Age: 79
LOS: 4 days | Discharge: NURSING, REHAB OR LONG-TERM CARE | DRG: 564 | End: 2024-08-20
Attending: INTERNAL MEDICINE | Admitting: INTERNAL MEDICINE
Payer: MEDICARE

## 2024-08-16 ENCOUNTER — HOSPITAL ENCOUNTER (EMERGENCY)
Age: 79
Discharge: ANOTHER HOSPITAL | End: 2024-08-16
Attending: EMERGENCY MEDICINE
Payer: MEDICARE

## 2024-08-16 VITALS
SYSTOLIC BLOOD PRESSURE: 152 MMHG | OXYGEN SATURATION: 96 % | HEIGHT: 72 IN | RESPIRATION RATE: 16 BRPM | TEMPERATURE: 97.7 F | WEIGHT: 190 LBS | BODY MASS INDEX: 25.73 KG/M2 | DIASTOLIC BLOOD PRESSURE: 81 MMHG | HEART RATE: 64 BPM
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
## 2024-08-16 DIAGNOSIS — S02.91XB OPEN FRACTURE OF SKULL, UNSPECIFIED BONE, INITIAL ENCOUNTER (HCC): ICD-10-CM

## 2024-08-16 PROBLEM — S06.33AA SKULL FRACTURE WITH CEREBRAL CONTUSION, CLOSED, INITIAL ENCOUNTER (HCC): Status: ACTIVE | Noted: 2024-08-16

## 2024-08-16 PROBLEM — S02.91XA SKULL FRACTURE WITH CEREBRAL CONTUSION, CLOSED, INITIAL ENCOUNTER (HCC): Status: ACTIVE | Noted: 2024-08-16

## 2024-08-16 LAB
ANION GAP SERPL CALCULATED.3IONS-SCNC: 12 MMOL/L (ref 3–16)
BASOPHILS # BLD: 0.1 K/UL (ref 0–0.2)
BASOPHILS NFR BLD: 0.7 %
BUN SERPL-MCNC: 30 MG/DL (ref 7–20)
CALCIUM SERPL-MCNC: 9.4 MG/DL (ref 8.3–10.6)
CHLORIDE SERPL-SCNC: 101 MMOL/L (ref 99–110)
CO2 SERPL-SCNC: 26 MMOL/L (ref 21–32)
CREAT SERPL-MCNC: 1 MG/DL (ref 0.8–1.3)
DEPRECATED RDW RBC AUTO: 13.8 % (ref 12.4–15.4)
EKG ATRIAL RATE: 64 BPM
EKG DIAGNOSIS: NORMAL
EKG P AXIS: 61 DEGREES
EKG P-R INTERVAL: 156 MS
EKG Q-T INTERVAL: 432 MS
EKG QRS DURATION: 82 MS
EKG QTC CALCULATION (BAZETT): 445 MS
EKG R AXIS: 56 DEGREES
EKG T AXIS: 64 DEGREES
EKG VENTRICULAR RATE: 64 BPM
EOSINOPHIL # BLD: 0.1 K/UL (ref 0–0.6)
EOSINOPHIL NFR BLD: 0.6 %
GFR SERPLBLD CREATININE-BSD FMLA CKD-EPI: 76 ML/MIN/{1.73_M2}
GLUCOSE SERPL-MCNC: 84 MG/DL (ref 70–99)
HCT VFR BLD AUTO: 36.8 % (ref 40.5–52.5)
HGB BLD-MCNC: 12.3 G/DL (ref 13.5–17.5)
LYMPHOCYTES # BLD: 0.7 K/UL (ref 1–5.1)
LYMPHOCYTES NFR BLD: 8.2 %
MCH RBC QN AUTO: 31.8 PG (ref 26–34)
MCHC RBC AUTO-ENTMCNC: 33.5 G/DL (ref 31–36)
MCV RBC AUTO: 95 FL (ref 80–100)
MONOCYTES # BLD: 0.7 K/UL (ref 0–1.3)
MONOCYTES NFR BLD: 8 %
NEUTROPHILS # BLD: 7.4 K/UL (ref 1.7–7.7)
NEUTROPHILS NFR BLD: 82.5 %
PLATELET # BLD AUTO: 224 K/UL (ref 135–450)
PMV BLD AUTO: 7.9 FL (ref 5–10.5)
POTASSIUM SERPL-SCNC: 4.2 MMOL/L (ref 3.5–5.1)
RBC # BLD AUTO: 3.87 M/UL (ref 4.2–5.9)
SODIUM SERPL-SCNC: 139 MMOL/L (ref 136–145)
WBC # BLD AUTO: 8.9 K/UL (ref 4–11)

## 2024-08-16 PROCEDURE — 2060000000 HC ICU INTERMEDIATE R&B

## 2024-08-16 PROCEDURE — 72125 CT NECK SPINE W/O DYE: CPT

## 2024-08-16 PROCEDURE — 70450 CT HEAD/BRAIN W/O DYE: CPT

## 2024-08-16 PROCEDURE — 99285 EMERGENCY DEPT VISIT HI MDM: CPT

## 2024-08-16 PROCEDURE — 80048 BASIC METABOLIC PNL TOTAL CA: CPT

## 2024-08-16 PROCEDURE — 85025 COMPLETE CBC W/AUTO DIFF WBC: CPT

## 2024-08-16 PROCEDURE — 36415 COLL VENOUS BLD VENIPUNCTURE: CPT

## 2024-08-16 RX ORDER — ONDANSETRON 2 MG/ML
4 INJECTION INTRAMUSCULAR; INTRAVENOUS EVERY 6 HOURS PRN
Status: DISCONTINUED | OUTPATIENT
Start: 2024-08-16 | End: 2024-08-20 | Stop reason: HOSPADM

## 2024-08-16 RX ORDER — ACETAMINOPHEN 650 MG/1
650 SUPPOSITORY RECTAL EVERY 6 HOURS PRN
Status: DISCONTINUED | OUTPATIENT
Start: 2024-08-16 | End: 2024-08-18 | Stop reason: SDUPTHER

## 2024-08-16 RX ORDER — ACETAMINOPHEN 325 MG/1
650 TABLET ORAL EVERY 6 HOURS PRN
Status: DISCONTINUED | OUTPATIENT
Start: 2024-08-16 | End: 2024-08-18 | Stop reason: SDUPTHER

## 2024-08-16 RX ORDER — ONDANSETRON 4 MG/1
4 TABLET, ORALLY DISINTEGRATING ORAL EVERY 8 HOURS PRN
Status: DISCONTINUED | OUTPATIENT
Start: 2024-08-16 | End: 2024-08-20 | Stop reason: HOSPADM

## 2024-08-16 RX ORDER — SODIUM CHLORIDE 0.9 % (FLUSH) 0.9 %
5-40 SYRINGE (ML) INJECTION EVERY 12 HOURS SCHEDULED
Status: DISCONTINUED | OUTPATIENT
Start: 2024-08-16 | End: 2024-08-20 | Stop reason: HOSPADM

## 2024-08-16 RX ORDER — SODIUM CHLORIDE 9 MG/ML
INJECTION, SOLUTION INTRAVENOUS PRN
Status: DISCONTINUED | OUTPATIENT
Start: 2024-08-16 | End: 2024-08-20 | Stop reason: HOSPADM

## 2024-08-16 RX ORDER — MAGNESIUM SULFATE IN WATER 40 MG/ML
2000 INJECTION, SOLUTION INTRAVENOUS PRN
Status: DISCONTINUED | OUTPATIENT
Start: 2024-08-16 | End: 2024-08-20 | Stop reason: HOSPADM

## 2024-08-16 RX ORDER — POTASSIUM CHLORIDE 1500 MG/1
40 TABLET, EXTENDED RELEASE ORAL PRN
Status: DISCONTINUED | OUTPATIENT
Start: 2024-08-16 | End: 2024-08-20 | Stop reason: HOSPADM

## 2024-08-16 RX ORDER — SODIUM CHLORIDE 0.9 % (FLUSH) 0.9 %
5-40 SYRINGE (ML) INJECTION PRN
Status: DISCONTINUED | OUTPATIENT
Start: 2024-08-16 | End: 2024-08-20 | Stop reason: HOSPADM

## 2024-08-16 RX ORDER — POLYETHYLENE GLYCOL 3350 17 G/17G
17 POWDER, FOR SOLUTION ORAL DAILY PRN
Status: DISCONTINUED | OUTPATIENT
Start: 2024-08-16 | End: 2024-08-20 | Stop reason: HOSPADM

## 2024-08-16 RX ORDER — POTASSIUM CHLORIDE 7.45 MG/ML
10 INJECTION INTRAVENOUS PRN
Status: DISCONTINUED | OUTPATIENT
Start: 2024-08-16 | End: 2024-08-20 | Stop reason: HOSPADM

## 2024-08-16 ASSESSMENT — PAIN SCALES - PAIN ASSESSMENT IN ADVANCED DEMENTIA (PAINAD)
CONSOLABILITY: NO NEED TO CONSOLE
FACIALEXPRESSION: SMILING OR INEXPRESSIVE
BREATHING: NORMAL
BODYLANGUAGE: RELAXED
TOTALSCORE: 0

## 2024-08-16 ASSESSMENT — PAIN SCALES - GENERAL: PAINLEVEL_OUTOF10: 0

## 2024-08-16 ASSESSMENT — PAIN - FUNCTIONAL ASSESSMENT
PAIN_FUNCTIONAL_ASSESSMENT: PAIN ASSESSMENT IN ADVANCED DEMENTIA (PAINAD)
PAIN_FUNCTIONAL_ASSESSMENT: PAIN ASSESSMENT IN ADVANCED DEMENTIA (PAINAD)

## 2024-08-16 NOTE — ED NOTES
Pt bed alarm alerted staff. Pt sitting up and scooting to end of bed. Pt able to be redirected to scoot back into bed, without staff having to further intervene. Pt repositioned for comfort. Fall precautions remain in place.

## 2024-08-16 NOTE — ED PROVIDER NOTES
Monocytes % 8.0 %    Eosinophils % 0.6 %    Basophils % 0.7 %    Neutrophils Absolute 7.4 1.7 - 7.7 K/uL    Lymphocytes Absolute 0.7 (L) 1.0 - 5.1 K/uL    Monocytes Absolute 0.7 0.0 - 1.3 K/uL    Eosinophils Absolute 0.1 0.0 - 0.6 K/uL    Basophils Absolute 0.1 0.0 - 0.2 K/uL   BMP w/ Reflex to MG   Result Value Ref Range    Sodium 139 136 - 145 mmol/L    Potassium reflex Magnesium 4.2 3.5 - 5.1 mmol/L    Chloride 101 99 - 110 mmol/L    CO2 26 21 - 32 mmol/L    Anion Gap 12 3 - 16    Glucose 84 70 - 99 mg/dL    BUN 30 (H) 7 - 20 mg/dL    Creatinine 1.0 0.8 - 1.3 mg/dL    Est, Glom Filt Rate 76 >60    Calcium 9.4 8.3 - 10.6 mg/dL   EKG 12 Lead   Result Value Ref Range    Ventricular Rate 64 BPM    Atrial Rate 64 BPM    P-R Interval 156 ms    QRS Duration 82 ms    Q-T Interval 432 ms    QTc Calculation (Bazett) 445 ms    P Axis 61 degrees    R Axis 56 degrees    T Axis 64 degrees    Diagnosis       Normal sinus rhythmNormal ECGWhen compared with ECG of 19-APR-2023 10:58,QT has shortened       Procedures  Procedures    ED Course       Upon reassessment, Brent Mina continues to rest comfortably.      Diagnostic studies reviewed.  Patient has a nondepressed skull fracture    I discussed the results with patient/family including incidental findings.    Consults/discussion with other professionals: IP CONSULT TO NEUROSURGERY    Neurosurgery recommends transfer to [facility] for observation.  Also recommends Noncon CT C-spine    I spoke with hospitalist Dr. Torres, who accepted for admission      Clinical Impression:  1. Fall, initial encounter    2. Open fracture of skull, unspecified bone, initial encounter ([facility])          Disposition / Plan:  Decision To Transfer  Condition: stable  Blood pressure (!) 151/72, pulse 64, temperature 97.7 °F (36.5 °C), temperature source Oral, resp. rate 16, height 1.829 m (6'), weight 86.2 kg (190 lb), SpO2 98%.      Patient was given the following medications. I counseled patient how to

## 2024-08-16 NOTE — CONSULTS
Called to [facility] @ 1923 for [facility] Neurosurgery   PER:  Ranjit Rodriguez MD  RE:  skull fx  Connected to [facility] Neuro NP @ 1928, Dave Gregorio  [facility] Hospitalist called to speak with Dr. Rodriguez @ 1953  Patient was accepted.  Bed assignment: 5503  N2N: [phone]

## 2024-08-16 NOTE — ED NOTES
Completed wound care on pt's head laceration. Laceration was cleaned with chlorhexidine, gauze pads, and normal saline. Laceration was appropriately scrubbed out and irrigated. Laceration was dressed with Bacitracin and gauze. Pt tolerated well.

## 2024-08-16 NOTE — ED NOTES
Patient identified as a positive fall risk on the ED triage fall screening.  Patient placed in fall precautions which includes:  bed alarm, yellow fall risk bracelet on wrist and yellow socks on feet. Patient instructed on importance of not getting out of bed or ambulating without assistance for safety.  Pt with h/o dementia and cannot voice understanding. Pt is close to nursing station, in room 12, for close monitoring.

## 2024-08-17 PROCEDURE — 6370000000 HC RX 637 (ALT 250 FOR IP): Performed by: INTERNAL MEDICINE

## 2024-08-17 PROCEDURE — 6360000002 HC RX W HCPCS: Performed by: INTERNAL MEDICINE

## 2024-08-17 PROCEDURE — 6360000002 HC RX W HCPCS: Performed by: NURSE PRACTITIONER

## 2024-08-17 PROCEDURE — 2060000000 HC ICU INTERMEDIATE R&B

## 2024-08-17 PROCEDURE — 2580000003 HC RX 258: Performed by: INTERNAL MEDICINE

## 2024-08-17 RX ORDER — HYDRALAZINE HYDROCHLORIDE 20 MG/ML
5 INJECTION INTRAMUSCULAR; INTRAVENOUS ONCE
Status: COMPLETED | OUTPATIENT
Start: 2024-08-17 | End: 2024-08-17

## 2024-08-17 RX ADMIN — HYDRALAZINE HYDROCHLORIDE 5 MG: 20 INJECTION INTRAMUSCULAR; INTRAVENOUS at 16:43

## 2024-08-17 RX ADMIN — SODIUM CHLORIDE, PRESERVATIVE FREE 10 ML: 5 INJECTION INTRAVENOUS at 20:46

## 2024-08-17 RX ADMIN — ONDANSETRON 4 MG: 2 INJECTION INTRAMUSCULAR; INTRAVENOUS at 09:02

## 2024-08-17 RX ADMIN — SODIUM CHLORIDE, PRESERVATIVE FREE 10 ML: 5 INJECTION INTRAVENOUS at 09:22

## 2024-08-17 RX ADMIN — ACETAMINOPHEN 650 MG: 325 TABLET ORAL at 17:59

## 2024-08-17 RX ADMIN — SODIUM CHLORIDE, PRESERVATIVE FREE 10 ML: 5 INJECTION INTRAVENOUS at 03:10

## 2024-08-17 ASSESSMENT — PAIN SCALES - PAIN ASSESSMENT IN ADVANCED DEMENTIA (PAINAD)
BODYLANGUAGE: RELAXED
TOTALSCORE: 0
CONSOLABILITY: NO NEED TO CONSOLE
BODYLANGUAGE: RELAXED
BODYLANGUAGE: RELAXED
FACIALEXPRESSION: SMILING OR INEXPRESSIVE
CONSOLABILITY: NO NEED TO CONSOLE
BREATHING: NORMAL
BREATHING: NORMAL
TOTALSCORE: 0
FACIALEXPRESSION: SMILING OR INEXPRESSIVE
FACIALEXPRESSION: SMILING OR INEXPRESSIVE
BREATHING: NORMAL
CONSOLABILITY: NO NEED TO CONSOLE
TOTALSCORE: 0

## 2024-08-17 ASSESSMENT — PAIN SCALES - GENERAL
PAINLEVEL_OUTOF10: 0
PAINLEVEL_OUTOF10: 0
PAINLEVEL_OUTOF10: 2
PAINLEVEL_OUTOF10: 3
PAINLEVEL_OUTOF10: 6

## 2024-08-17 ASSESSMENT — PAIN DESCRIPTION - LOCATION
LOCATION: GENERALIZED
LOCATION: GENERALIZED

## 2024-08-17 ASSESSMENT — PAIN - FUNCTIONAL ASSESSMENT
PAIN_FUNCTIONAL_ASSESSMENT: ACTIVITIES ARE NOT PREVENTED
PAIN_FUNCTIONAL_ASSESSMENT: ACTIVITIES ARE NOT PREVENTED

## 2024-08-17 ASSESSMENT — PAIN DESCRIPTION - DESCRIPTORS
DESCRIPTORS: DISCOMFORT
DESCRIPTORS: DISCOMFORT

## 2024-08-17 NOTE — CARE COORDINATION
Case Management Assessment  Initial Evaluation    Date/Time of Evaluation: 8/17/2024 3:30 PM  Assessment Completed by: Evie Forde RN    If patient is discharged prior to next notation, then this note serves as note for discharge by case management.    Patient Name: Brent Mina                   YOB: 1945  Diagnosis: Skull fracture with cerebral contusion, closed, initial encounter ([facility]) [S02.91XA, S06.33AA]                   Date / Time: 8/16/2024  9:47 PM    Patient Admission Status: Inpatient   Readmission Risk (Low < 19, Mod (19-27), High > 27): Readmission Risk Score: 11.4    Current PCP: Aravind Reed MD  PCP verified by CM?      Chart Reviewed: Yes      History Provided by:    Patient Orientation:      Patient Cognition:      Hospitalization in the last 30 days (Readmission):  No    If yes, Readmission Assessment in CM Navigator will be completed.    Advance Directives:      Code Status: DNR-CCA   Patient's Primary Decision Maker is:      Primary Decision Maker: KeeleyLatanya M - Spouse - [phone]    Primary Decision Maker: Drew Mina  Child - [phone]    Discharge Planning:    Patient lives with: Alone Type of Home: Assisted living  Primary Care Giver:    Patient Support Systems include: Spouse/Significant Other   Current Financial resources:    Current community resources:    Current services prior to admission: None            Current DME:              Type of Home Care services:  None    ADLS  Prior functional level:    Current functional level:      PT AM-PAC:   /24  OT AM-PAC:   /24    Family can provide assistance at DC:    Would you like Case Management to discuss the discharge plan with any other family members/significant others, and if so, who?    Plans to Return to Present Housing:    Other Identified Issues/Barriers to RETURNING to current housing: may need SNF  Potential Assistance needed at discharge: N/A            Potential DME:    Patient expects to

## 2024-08-17 NOTE — CONSULTS
Neurosurgery Consult Note       Subjective:  CHIEF COMPLAINT / HPI:  Brent Mina is a 79 y.o. male patient being seen for complaints of Skull Fracture s/p fall    Brent Mina is a 79 y.o. male admitted from his memory care facility after an unwitnessed fall.  He was found by staff members with pain the back of his head with injury.     He has a history of dementia.  He is not on blood thinners.  No other known injuries.    During my consult with the patient he denies physical complaints.  No headache, vision changes, nausea.  No pain in his chest, and abdomen, neck, face, extremities.     CT head shows non displaced skull fracture of the vertex of skull         Past Medical History:   Diagnosis Date    ADHD (attention deficit hyperactivity disorder)     Alzheimer disease (HCC)     Cancer (HCC)     prostate    Hyperlipidemia     Hypertension     IFG (impaired fasting glucose)     OCD (obsessive compulsive disorder)     Osteoarthritis     Renal insufficiency     TIA (transient ischemic attack)      Past Surgical History:   Procedure Laterality Date    COLONOSCOPY  2004    EYE SURGERY      FEMUR FRACTURE SURGERY Left 4/20/2023    LEFT HIP GAMMA NAILING WITH CABLES                  GUERITA performed by Niko Higgins MD at [facility] OR    INTRACAPSULAR CATARACT EXTRACTION Right 10/21/2019    PHACOEMULSIFICATION OF CATARACT RIGHT EYE WITH INTRAOCULAR LENS IMPLANT performed by Jose Che MD at [facility] OR    INTRACAPSULAR CATARACT EXTRACTION Left 10/31/2019    PHACOEMULSIFICATION OF CATARACT LEFT EYE WITH INTRAOCULAR LENS IMPLANT performed by Jose Che MD at [facility] OR    PROSTATECTOMY       Pcn [penicillins] and Sulfa antibiotics    Current Facility-Administered Medications:     sodium chloride flush 0.9 % injection 5-40 mL, 5-40 mL, IntraVENous, 2 times per day, Levon Torres MD, 10 mL at 08/17/24 0310    sodium chloride flush 0.9 % injection 5-40 mL, 5-40 mL, IntraVENous, PRN, Levon Torres MD    0.9 %

## 2024-08-17 NOTE — PROGRESS NOTES
Pt declined breakfast, took small sips of OJ, became nauseated and had episode of emesis x1; gown/linen change completed, prn zofran administered. Will monitor and offer PO intake after one hour and as pt tolerates. Pt's attending provider made aware.

## 2024-08-17 NOTE — PROGRESS NOTES
4 Eyes Skin Assessment     NAME:  Brent Mina  YOB: 1945  MEDICAL RECORD NUMBER:  [id number]    The patient is being assessed for  Admission    I agree that at least one RN has performed a thorough Head to Toe Skin Assessment on the patient. ALL assessment sites listed below have been assessed.      Areas assessed by both nurses:    Head, Face, Ears, Shoulders, Back, Chest, Arms, Elbows, Hands, Sacrum. Buttock, Coccyx, Ischium, Legs. Feet and Heels, and Under Medical Devices   -laceration/ bruising on back of head  -generalized ecchymosis        Does the Patient have a Wound? No noted wound(s)       Eleuterio Prevention initiated by RN: No  Wound Care Orders initiated by RN: No    Pressure Injury (Stage 3,4, Unstageable, DTI, NWPT, and Complex wounds) if present, place Wound referral order by RN under : No    New Ostomies, if present place, Ostomy referral order under : No     Nurse 1 eSignature: Electronically signed by Telma Ruby RN on 8/17/24 at 3:56 AM EDT    **SHARE this note so that the co-signing nurse can place an eSignature**    Nurse 2 eSignature: Electronically signed by An Kennedy RN on 8/17/24 at 5:36 AM EDT

## 2024-08-17 NOTE — PROGRESS NOTES
Update on pt's status provided to pt's son, Grayson. Pt's son relayed he will be available through end of evening tonight but will be traveling out of country early tomorrow morning and will not be available, all calls and needs should be sent to Latanya (wife) at [phone]. Family relayed they'd prefer pt go to [facility] if he were to require skilled nursing care in lieu of assisted living at discharge.

## 2024-08-17 NOTE — PLAN OF CARE
Problem: Pain  Goal: Verbalizes/displays adequate comfort level or baseline comfort level  Outcome: Progressing  Note: Patient is encouraged to monitor pain and request assistance. Appropriate pain scale is being used.     Problem: Safety - Adult  Goal: Free from fall injury  Outcome: Progressing  Note: Pt is free of fall injury this shift. All proper safety precautions are in place. Call light is within reach. Bed alarm is on.

## 2024-08-17 NOTE — ED NOTES
Pt resting in bed with bed alarm in place, warm blanket provided, pt denies any needs. Pt placed in position of comfort.

## 2024-08-17 NOTE — PROGRESS NOTES
Patient admitted to unit overnight. Neuro critical team notified upon arrival. Patient is A&Ox 1. VSS this shift with exception of baseline HTN. Pt denies pain. Patient reports dizziness when sitting up. Voiding via incontinent briefs. Patient updated on plan of care. Fall and safety precautions in place, call light within reach.

## 2024-08-17 NOTE — H&P
V2.0  History and Physical      Name:  Brent Mina /Age/Sex: 1945  (79 y.o. male)   MRN & CSN:  [id number] & [id number] Encounter Date/Time: 2024 10:22 PM EDT   Location:  5503/5503-01 PCP: Aravind Reed MD       Hospital Day: 1    Assessment and Plan:   Brent Mina is a 79 y.o. male with PMH of dementia, lives at memory care unit, HTN, ORIF of left hip in , now presenting with fall and     Skull fracture with cerebral contusion, closed, initial encounter (HCC)- fracture is non displaced and non depressed  Small scalp hematoma    Case with d/w NS who recommended transfer to [facility] from H and observation  Small laceration on occiput  CT C spine negative  CT head shows non displaced skull #  monitoring    2. Dementia- chronic condition, oriented to self    Resume home meds  Obtain accurate home med rec    3. HTN- resume home meds    Hospital Problems             Last Modified POA    * (Principal) Skull fracture with cerebral contusion, closed, initial encounter ([facility]) 2024 Yes       Disposition:   Current Living situation: memory care unit  Expected Disposition: memory care unit  Estimated D/C: 24    Diet No diet orders on file   DVT Prophylaxis [] Lovenox, []  Heparin, [x] SCDs, [] Ambulation,  [] Eliquis, [] Xarelto, [] Coumadin   Code Status Full Code   Surrogate Decision Maker/ POA NA     Personally reviewed Lab Studies and Imaging     History from:     patient    History of Present Illness:     Chief Complaint: fall and skull pain    Brent Mina is a 79 y.o.  WM who presents from his memory care facility after an unwitnessed fall.  He was found by staff members with injury to his occiput.  He has a history of dementia.  He is not on blood thinners.  No other known injuries.  On arrival to the ER the patient denies physical complaints.  No headache, vision changes, nausea.  No pain in his chest, and abdomen, neck, face, extremities.    CT head shows non displaced  BACTERIA 4+ 01/06/2022 07:40 PM    CLARITYU Clear 04/19/2023 12:07 PM    SPECGRAV 1.025 12/28/2012 08:25 AM    LEUKOCYTESUR Negative 04/19/2023 12:07 PM    UROBILINOGEN 0.2 04/19/2023 12:07 PM    BILIRUBINUR Negative 04/19/2023 12:07 PM    BILIRUBINUR neg 12/28/2012 08:25 AM    BLOODU Negative 04/19/2023 12:07 PM    GLUCOSEU Negative 04/19/2023 12:07 PM    KETUA Negative 04/19/2023 12:07 PM     Urine Cultures:   Lab Results   Component Value Date/Time    LABURIN >100,000 CFU/ml 01/06/2022 07:40 PM     Blood Cultures: No results found for: \"BC\"  No results found for: \"BLOODCULT2\"  Organism:   Lab Results   Component Value Date/Time    ORG Escherichia coli 01/06/2022 07:40 PM       Imaging/Diagnostics Last 24 Hours   CT C-Spine W/O Contrast    Result Date: 8/16/2024  EXAMINATION: CT OF THE CERVICAL SPINE WITHOUT CONTRAST 8/16/2024 8:30 pm TECHNIQUE: CT of the cervical spine was performed without the administration of intravenous contrast. Multiplanar reformatted images are provided for review. Automated exposure control, iterative reconstruction, and/or weight based adjustment of the mA/kV was utilized to reduce the radiation dose to as low as reasonably achievable. COMPARISON: None. HISTORY: ORDERING SYSTEM PROVIDED HISTORY: fall TECHNOLOGIST PROVIDED HISTORY: Reason for exam:->fall Decision Support Exception - unselect if not a suspected or confirmed emergency medical condition->Emergency Medical Condition (MA) Reason for Exam: fall injury. hx of dementia FINDINGS: BONES/ALIGNMENT: There is no acute fracture or traumatic malalignment. DEGENERATIVE CHANGES: There is mild multilevel degenerative disease involving the cervical spine. SOFT TISSUES: There is no prevertebral soft tissue swelling.     No acute abnormality of the cervical spine.     CT HEAD WO CONTRAST    Result Date: 8/16/2024  EXAMINATION: CT OF THE HEAD WITHOUT CONTRAST  8/16/2024 5:35 pm TECHNIQUE: CT of the head was performed without the administration

## 2024-08-17 NOTE — ED NOTES
Wife Latanya called and aware pt to be transferred to [facility]. Family aware pf diagnosis.

## 2024-08-17 NOTE — PLAN OF CARE
Problem: Discharge Planning  Goal: Discharge to home or other facility with appropriate resources  8/17/2024 1021 by Erin Portillo RN  Outcome: Progressing    Problem: Pain  Goal: Verbalizes/displays adequate comfort level or baseline comfort level  8/17/2024 1021 by Erin Portillo RN  Outcome: Progressing  Note: Appropriate pain scale is being used; no s/s of pain assessed at this time, will continue to monitor.      Problem: Skin/Tissue Integrity  Goal: Absence of new skin breakdown  Description: 1.  Monitor for areas of redness and/or skin breakdown  2.  Assess vascular access sites hourly  3.  Every 4-6 hours minimum:  Change oxygen saturation probe site  4.  Every 4-6 hours:  If on nasal continuous positive airway pressure, respiratory therapy assess nares and determine need for appliance change or resting period.   8/17/2024 1021 by Erin Portillo RN  Outcome: Progressing   Skin assessed with no new areas of concern/breakdown.       Problem: Safety - Adult  Goal: Free from fall injury  8/17/2024 1021 by Erin Portillo RN  Outcome: Progressing  Note: Patient is a fall risk. Fall risk protocol in place as per fall risk score, see assessment for details. Bed is locked and in lowest possible position, side rails up x2, alarm in place and on, no slip footwear on. Call light/belongings within reach. Patient is impulsive at times. Continuous video monitoring in place. Hourly rounds in place for safety, pt remains free from fall/injury. Will continue to monitor.

## 2024-08-18 ENCOUNTER — APPOINTMENT (OUTPATIENT)
Dept: CT IMAGING | Age: 79
DRG: 564 | End: 2024-08-18
Attending: INTERNAL MEDICINE
Payer: MEDICARE

## 2024-08-18 PROCEDURE — 6360000002 HC RX W HCPCS: Performed by: NURSE PRACTITIONER

## 2024-08-18 PROCEDURE — 6370000000 HC RX 637 (ALT 250 FOR IP): Performed by: INTERNAL MEDICINE

## 2024-08-18 PROCEDURE — 6360000002 HC RX W HCPCS: Performed by: INTERNAL MEDICINE

## 2024-08-18 PROCEDURE — 2060000000 HC ICU INTERMEDIATE R&B

## 2024-08-18 PROCEDURE — 2580000003 HC RX 258: Performed by: INTERNAL MEDICINE

## 2024-08-18 PROCEDURE — 6370000000 HC RX 637 (ALT 250 FOR IP): Performed by: NURSE PRACTITIONER

## 2024-08-18 PROCEDURE — 97166 OT EVAL MOD COMPLEX 45 MIN: CPT

## 2024-08-18 PROCEDURE — 97162 PT EVAL MOD COMPLEX 30 MIN: CPT

## 2024-08-18 PROCEDURE — 6360000004 HC RX CONTRAST MEDICATION: Performed by: NURSE PRACTITIONER

## 2024-08-18 PROCEDURE — 70470 CT HEAD/BRAIN W/O & W/DYE: CPT

## 2024-08-18 PROCEDURE — 97530 THERAPEUTIC ACTIVITIES: CPT

## 2024-08-18 RX ORDER — IOPAMIDOL 755 MG/ML
75 INJECTION, SOLUTION INTRAVASCULAR
Status: COMPLETED | OUTPATIENT
Start: 2024-08-18 | End: 2024-08-18

## 2024-08-18 RX ORDER — AMLODIPINE BESYLATE 5 MG/1
5 TABLET ORAL DAILY
Status: DISCONTINUED | OUTPATIENT
Start: 2024-08-18 | End: 2024-08-19

## 2024-08-18 RX ORDER — ASPIRIN 81 MG/1
81 TABLET, CHEWABLE ORAL DAILY
Status: DISCONTINUED | OUTPATIENT
Start: 2024-08-18 | End: 2024-08-20 | Stop reason: HOSPADM

## 2024-08-18 RX ORDER — PROCHLORPERAZINE EDISYLATE 5 MG/ML
10 INJECTION INTRAMUSCULAR; INTRAVENOUS ONCE
Status: COMPLETED | OUTPATIENT
Start: 2024-08-18 | End: 2024-08-18

## 2024-08-18 RX ORDER — ACETAMINOPHEN 325 MG/1
650 TABLET ORAL EVERY 6 HOURS
Status: DISCONTINUED | OUTPATIENT
Start: 2024-08-18 | End: 2024-08-20 | Stop reason: HOSPADM

## 2024-08-18 RX ORDER — ATORVASTATIN CALCIUM 40 MG/1
40 TABLET, FILM COATED ORAL DAILY
Status: DISCONTINUED | OUTPATIENT
Start: 2024-08-18 | End: 2024-08-20 | Stop reason: HOSPADM

## 2024-08-18 RX ORDER — HYDRALAZINE HYDROCHLORIDE 20 MG/ML
10 INJECTION INTRAMUSCULAR; INTRAVENOUS ONCE
Status: COMPLETED | OUTPATIENT
Start: 2024-08-18 | End: 2024-08-18

## 2024-08-18 RX ADMIN — PROCHLORPERAZINE EDISYLATE 10 MG: 5 INJECTION INTRAMUSCULAR; INTRAVENOUS at 07:54

## 2024-08-18 RX ADMIN — FLUOXETINE HYDROCHLORIDE 60 MG: 20 CAPSULE ORAL at 12:35

## 2024-08-18 RX ADMIN — ACETAMINOPHEN 650 MG: 325 TABLET ORAL at 17:59

## 2024-08-18 RX ADMIN — HYDRALAZINE HYDROCHLORIDE 10 MG: 20 INJECTION INTRAMUSCULAR; INTRAVENOUS at 07:54

## 2024-08-18 RX ADMIN — ACETAMINOPHEN 650 MG: 325 TABLET ORAL at 23:19

## 2024-08-18 RX ADMIN — ASPIRIN 81 MG: 81 TABLET, CHEWABLE ORAL at 12:35

## 2024-08-18 RX ADMIN — SODIUM CHLORIDE, PRESERVATIVE FREE 10 ML: 5 INJECTION INTRAVENOUS at 07:46

## 2024-08-18 RX ADMIN — ACETAMINOPHEN 650 MG: 325 TABLET ORAL at 12:35

## 2024-08-18 RX ADMIN — IOPAMIDOL 75 ML: 755 INJECTION, SOLUTION INTRAVENOUS at 08:50

## 2024-08-18 RX ADMIN — AMLODIPINE BESYLATE 5 MG: 5 TABLET ORAL at 12:35

## 2024-08-18 RX ADMIN — ACETAMINOPHEN 650 MG: 325 TABLET ORAL at 03:41

## 2024-08-18 RX ADMIN — ONDANSETRON 4 MG: 2 INJECTION INTRAMUSCULAR; INTRAVENOUS at 07:46

## 2024-08-18 ASSESSMENT — PAIN SCALES - PAIN ASSESSMENT IN ADVANCED DEMENTIA (PAINAD)
CONSOLABILITY: NO NEED TO CONSOLE
CONSOLABILITY: NO NEED TO CONSOLE
BODYLANGUAGE: RELAXED
BODYLANGUAGE: RELAXED
BREATHING: NORMAL
FACIALEXPRESSION: SMILING OR INEXPRESSIVE
TOTALSCORE: 0
TOTALSCORE: 0
TOTALSCORE: 3
BREATHING: NORMAL
BODYLANGUAGE: TENSE, DISTRESSED PACING, FIDGETING
FACIALEXPRESSION: FACIAL GRIMACING
BREATHING: NORMAL
CONSOLABILITY: NO NEED TO CONSOLE
FACIALEXPRESSION: SMILING OR INEXPRESSIVE

## 2024-08-18 ASSESSMENT — PAIN SCALES - GENERAL
PAINLEVEL_OUTOF10: 3
PAINLEVEL_OUTOF10: 5
PAINLEVEL_OUTOF10: 0

## 2024-08-18 NOTE — PROGRESS NOTES
Patient alert, oriented to self. Delay responses. Follows simple command.   Poor fluid intake over night.   Able to turn self. Reporting generalized pain. PRN tylenol given.   Fall precautions in place.

## 2024-08-18 NOTE — PLAN OF CARE
Problem: Pain  Goal: Verbalizes/displays adequate comfort level or baseline comfort level  8/18/2024 1926 by Kerri To, RN  Outcome: Progressing  Note: Pt. Managing pain per MAR.     Problem: Safety - Adult  Goal: Free from fall injury  8/18/2024 1926 by Kerri To, RN  Outcome: Progressing  Note: All fall precautions in place and call light is within reach.       Detail Level: Detailed

## 2024-08-18 NOTE — PLAN OF CARE
Problem: Discharge Planning  Goal: Discharge to home or other facility with appropriate resources  Outcome: Progressing     Problem: Pain  Goal: Verbalizes/displays adequate comfort level or baseline comfort level  Outcome: Progressing  Pt exhibits facial grimacing and responds \"I dont know\" when pain is assessed, pt repositioned and offered emotional support, too soon to given prn tylenol again as given on HS shift. Pt now resting comfortably with both eyes closed, no longer facial grimacing.     Problem: Skin/Tissue Integrity  Goal: Absence of new skin breakdown  Description: 1.  Monitor for areas of redness and/or skin breakdown  2.  Assess vascular access sites hourly  3.  Every 4-6 hours minimum:  Change oxygen saturation probe site  4.  Every 4-6 hours:  If on nasal continuous positive airway pressure, respiratory therapy assess nares and determine need for appliance change or resting period.  Outcome: Progressing   Skin assessed with no new areas of breakdown or concern noted.    Problem: Safety - Adult  Goal: Free from fall injury  Outcome: Progressing  Patient is a fall risk. Fall risk protocol in place as per fall risk score, see assessment for details. Bed is locked and in lowest possible position, side rails up x2, alarm in place and on, no slip footwear on. Call light/belongings within reach. Live stream camera in place for safety. Hourly rounds in place for safety, pt remains free from fall/injury. Will continue to monitor.

## 2024-08-18 NOTE — PROGRESS NOTES
V2.0    [facility] [place]      Name:  Brent Mina /Age/Sex: 1945  (79 y.o. male)   MRN & CSN:  [id number] & [id number] Encounter Date/Time: 2024 10:57 AM EDT   Location:  5503/5503-01 PCP: Aravind Reed MD     Attending:Summer English MD       Hospital Day: 3    Assessment and Recommendations   Brent Mina is a 79 y.o. male with pmh of  of dementia, lives at memory care unit, HTN, ORIF of left hip in , now presenting with fall and  who presents with Skull fracture with cerebral contusion, closed, initial encounter (HCC)      Plan:     Skull fracture with cerebral contusion, closed, initial encounter (HCC)- fracture is non displaced and non depressed  Small scalp hematoma     Case with d/w NS who recommended transfer to [facility] from [facility] and observation  Small laceration on occiput  CT C spine negative  CT head shows non displaced skull #  Monitoring;    Patient was evaluated by neurosurgery team on .  No intracranial injury therefore no surgery required for skull fracture at this time.       2. Dementia- chronic condition, oriented to self     Resume home meds  Obtain accurate home med rec     3. HTN- resume home meds    Diet ADULT DIET; Regular  ADULT ORAL NUTRITION SUPPLEMENT; Breakfast, Lunch, Dinner; Standard High Calorie/High Protein Oral Supplement   DVT Prophylaxis [] Lovenox, []  Heparin, [] SCDs, [] Ambulation,  [] Eliquis, [] Xarelto  [] Coumadin   Code Status DNR-CCA   Disposition From: Memory care  Expected Disposition: Memory care with therapy  Estimated Date of Discharge: 1-2   Patient requires continued admission due to fall/ skull Fx   Surrogate Decision Maker/ POA       Personally reviewed Lab Studies and Imaging     CT head - non displaced skull   Subjective:     Chief Complaint: Morning blood pressure elevated to 191/78; small epistaxis; and nausea and vomiting; stat CT head showed redemonstration of left sided calvarial fracture; no acute intracranial  HEAD WO CONTRAST    Result Date: 8/16/2024  EXAMINATION: CT OF THE HEAD WITHOUT CONTRAST  8/16/2024 5:35 pm TECHNIQUE: CT of the head was performed without the administration of intravenous contrast. Automated exposure control, iterative reconstruction, and/or weight based adjustment of the mA/kV was utilized to reduce the radiation dose to as low as reasonably achievable. COMPARISON: 01/12/2024 HISTORY: ORDERING SYSTEM PROVIDED HISTORY: trauma TECHNOLOGIST PROVIDED HISTORY: Reason for exam:->trauma Has a \"code stroke\" or \"stroke alert\" been called?->No Decision Support Exception - unselect if not a suspected or confirmed emergency medical condition->Emergency Medical Condition (MA) Reason for Exam: Trauma. struck back of head, [place] Relevant Medical/Surgical History: Possible stroke FINDINGS: BRAIN/VENTRICLES: The ventricles are moderately enlarged and there is diffuse moderate prominence of the cortical sulci which is unchanged.  There is mild periventricular low density bilaterally which is unchanged.  No intracranial hemorrhage or edema is seen.  There is no extra-axial fluid collection or mass. ORBITS: The visualized portion of the orbits demonstrate no acute abnormality. SINUSES: The visualized paranasal sinuses and mastoid air cells demonstrate no acute abnormality. SOFT TISSUES/SKULL:  There is a nondisplaced and nondepressed linear skull fracture along the vertex of the skull extending anteriorly, just lateral to the fissure on the left and posteriorly along the right occipital region which is more apparent.  The cranial sutures are intact.  There is mild soft tissue swelling along the right occipital regions superiorly.     Nondisplaced and nondepressed linear skull fracture along the vertex of the skull extending anteriorly and posteriorly. Moderate ventriculomegaly which could represent normal pressure hydrocephalus or atrophy and is unchanged. Mild chronic microischemic changes scattered in the deep white  matter which is unchanged with no acute intracranial abnormality seen. Small scalp hematoma along the right occipital region.       CBC:   Recent Labs     08/16/24  1841   WBC 8.9   HGB 12.3*        BMP:    Recent Labs     08/16/24  1841      K 4.2      CO2 26   BUN 30*   CREATININE 1.0   GLUCOSE 84     Hepatic: No results for input(s): \"AST\", \"ALT\", \"BILITOT\", \"ALKPHOS\" in the last 72 hours.    Invalid input(s): \"ALB\"  Lipids:   Lab Results   Component Value Date/Time    CHOL 212 12/26/2015 10:41 AM    HDL 39 12/26/2015 10:41 AM    HDL 65 12/15/2011 03:30 PM    TRIG 210 12/26/2015 10:41 AM     Hemoglobin A1C:   Lab Results   Component Value Date/Time    LABA1C 5.4 12/26/2015 10:41 AM     TSH:   Lab Results   Component Value Date/Time    TSH 0.51 01/07/2022 06:54 AM     Troponin: No results found for: \"TROPONINT\"  Lactic Acid: No results for input(s): \"LACTA\" in the last 72 hours.  BNP: No results for input(s): \"PROBNP\" in the last 72 hours.  UA:  Lab Results   Component Value Date/Time    NITRU Negative 04/19/2023 12:07 PM    COLORU Yellow 04/19/2023 12:07 PM    PHUR 5.5 04/19/2023 12:07 PM    WBCUA >100 01/06/2022 07:40 PM    RBCUA 5-10 01/06/2022 07:40 PM    BACTERIA 4+ 01/06/2022 07:40 PM    CLARITYU Clear 04/19/2023 12:07 PM    SPECGRAV 1.025 12/28/2012 08:25 AM    LEUKOCYTESUR Negative 04/19/2023 12:07 PM    UROBILINOGEN 0.2 04/19/2023 12:07 PM    BILIRUBINUR Negative 04/19/2023 12:07 PM    BILIRUBINUR neg 12/28/2012 08:25 AM    BLOODU Negative 04/19/2023 12:07 PM    GLUCOSEU Negative 04/19/2023 12:07 PM    KETUA Negative 04/19/2023 12:07 PM     Urine Cultures:   Lab Results   Component Value Date/Time    LABURIN >100,000 CFU/ml 01/06/2022 07:40 PM     Blood Cultures: No results found for: \"BC\"  No results found for: \"BLOODCULT2\"  Organism:   Lab Results   Component Value Date/Time    ORG Escherichia coli 01/06/2022 07:40 PM         Electronically signed by ADRIANA Atkinson CNP on

## 2024-08-18 NOTE — PROGRESS NOTES
V2.0    [facility] [place]      Name:  Brent Mina /Age/Sex: 1945  (79 y.o. male)   MRN & CSN:  [id number] & [id number] Encounter Date/Time: 2024 10:57 AM EDT   Location:  5503/5503-01 PCP: Aravind Reed MD     Attending:Summer English MD       Hospital Day: 3    Assessment and Recommendations   Brent Mina is a 79 y.o. male with pmh of  of dementia, lives at memory care unit, HTN, ORIF of left hip in , now presenting with fall and  who presents with Skull fracture with cerebral contusion, closed, initial encounter (HCC)      Plan:     Skull fracture with cerebral contusion, closed, initial encounter (HCC)- fracture is non displaced and non depressed  Small scalp hematoma     Case with d/w NS who recommended transfer to [facility] from [facility] and observation  Small laceration on occiput  CT C spine negative  CT head shows non displaced skull #  Monitoring;    Patient was evaluated by neurosurgery team on .  No intracranial injury therefore no surgery required for skull fracture at this time.       2. Dementia- chronic condition, oriented to self     Resume home meds  Obtain accurate home med rec     3. HTN- resume home meds    Diet ADULT DIET; Regular  ADULT ORAL NUTRITION SUPPLEMENT; Breakfast, Lunch, Dinner; Standard High Calorie/High Protein Oral Supplement   DVT Prophylaxis [] Lovenox, []  Heparin, [] SCDs, [] Ambulation,  [] Eliquis, [] Xarelto  [] Coumadin   Code Status DNR-CCA   Disposition From: Memory care  Expected Disposition: Memory care with therapy  Estimated Date of Discharge: 1-2   Patient requires continued admission due to fall/ skull Fx   Surrogate Decision Maker/ POA       Personally reviewed Lab Studies and Imaging     CT head - non displaced skull   Subjective:     Chief Complaint: drowsy and arousable    Brent Mina is a 79 y.o. male who presents from his memory care facility after an unwitnessed fall.  He was found by staff members with injury to his  matter which is unchanged with no acute intracranial abnormality seen. Small scalp hematoma along the right occipital region.       CBC:   Recent Labs     08/16/24  1841   WBC 8.9   HGB 12.3*        BMP:    Recent Labs     08/16/24  1841      K 4.2      CO2 26   BUN 30*   CREATININE 1.0   GLUCOSE 84     Hepatic: No results for input(s): \"AST\", \"ALT\", \"BILITOT\", \"ALKPHOS\" in the last 72 hours.    Invalid input(s): \"ALB\"  Lipids:   Lab Results   Component Value Date/Time    CHOL 212 12/26/2015 10:41 AM    HDL 39 12/26/2015 10:41 AM    HDL 65 12/15/2011 03:30 PM    TRIG 210 12/26/2015 10:41 AM     Hemoglobin A1C:   Lab Results   Component Value Date/Time    LABA1C 5.4 12/26/2015 10:41 AM     TSH:   Lab Results   Component Value Date/Time    TSH 0.51 01/07/2022 06:54 AM     Troponin: No results found for: \"TROPONINT\"  Lactic Acid: No results for input(s): \"LACTA\" in the last 72 hours.  BNP: No results for input(s): \"PROBNP\" in the last 72 hours.  UA:  Lab Results   Component Value Date/Time    NITRU Negative 04/19/2023 12:07 PM    COLORU Yellow 04/19/2023 12:07 PM    PHUR 5.5 04/19/2023 12:07 PM    WBCUA >100 01/06/2022 07:40 PM    RBCUA 5-10 01/06/2022 07:40 PM    BACTERIA 4+ 01/06/2022 07:40 PM    CLARITYU Clear 04/19/2023 12:07 PM    SPECGRAV 1.025 12/28/2012 08:25 AM    LEUKOCYTESUR Negative 04/19/2023 12:07 PM    UROBILINOGEN 0.2 04/19/2023 12:07 PM    BILIRUBINUR Negative 04/19/2023 12:07 PM    BILIRUBINUR neg 12/28/2012 08:25 AM    BLOODU Negative 04/19/2023 12:07 PM    GLUCOSEU Negative 04/19/2023 12:07 PM    KETUA Negative 04/19/2023 12:07 PM     Urine Cultures:   Lab Results   Component Value Date/Time    LABURIN >100,000 CFU/ml 01/06/2022 07:40 PM     Blood Cultures: No results found for: \"BC\"  No results found for: \"BLOODCULT2\"  Organism:   Lab Results   Component Value Date/Time    ORG Escherichia coli 01/06/2022 07:40 PM         Electronically signed by ADRIANA Atkinson CNP on

## 2024-08-18 NOTE — PROGRESS NOTES
Physical Therapy/Occupational Therapy  No treatment  Chart reviewed for PT/OT evaluation.  Spoke with RN who reports patient is going to stat head CT due to vomitting and nosebleed.  Will hold evaluations at this time and complete as able.    Ines Johnson, PT [id number]  Dee Dee Strauss, OTR/L

## 2024-08-18 NOTE — PROGRESS NOTES
Pt attempting to get out of bed without assistance, assisted back into middle of bed with episode of emesis and small right nostril bleeding. Vitals assessed with /78; ELINOR Nunez attending provider notified. New orders for stat CT head, one time dose of hydralazine and compazine administered, repeat bp 136/78. Pt neuro status remains unchanged, follows simple commands, alert to self only, symmetrical  and strength BLE and BUE extremities. Pt previously facial grimmacing and answering \"I dont know\" when asked if in pain, pt resting comfortably after receiving medications. Nasal bleeding stopped. Provider assessed at bedside, awaiting CT.     Update @ 0950: Repeat CT result unchanged, nothing acute noted. Pt now resting in bed with both eyes closed, easily responds to verbal stimuli. Will monitor.

## 2024-08-18 NOTE — PROGRESS NOTES
Physical Therapy  Facility/Department: [facility] 5T ORTHO/NEURO  Physical Therapy Initial Assessment and treatment    Name: Brent Mina  : 1945  MRN: [id number]  Date of Service: 2024    Discharge Recommendations:  Subacute/Skilled Nursing Facility   PT Equipment Recommendations  Equipment Needed: No (defer to next level of care)      Patient Diagnosis(es): skull fracture  Past Medical History:  has a past medical history of ADHD (attention deficit hyperactivity disorder), Alzheimer disease (HCC), Cancer (HCC), Hyperlipidemia, Hypertension, IFG (impaired fasting glucose), OCD (obsessive compulsive disorder), Osteoarthritis, Renal insufficiency, and TIA (transient ischemic attack).  Past Surgical History:  has a past surgical history that includes Colonoscopy (); Eye surgery; Prostatectomy; Intracapsular cataract extraction (Right, 10/21/2019); Intracapsular cataract extraction (Left, 10/31/2019); and Femur fracture surgery (Left, 2023).    Assessment   Body Structures, Functions, Activity Limitations Requiring Skilled Therapeutic Intervention: Decreased functional mobility ;Decreased cognition;Decreased endurance;Decreased balance;Decreased strength;Decreased safe awareness  Assessment: Patient tolerated session fair with mobility being limited due lethargy and overall poor cognition.  Patient is a poor historian and unable to provide prior level of function but per RN, patient was ambulatory at assisted living/memory care facility.  Patient with eyes closed through most treatment but is able to open with continuous verbal cues and sit EOB with heavy assist x 2.  Patient initially dependent for static sitting balance but improved to min assist/CGA and was able to take a few bites of lunch while sitting EOB.  Patient unable to verbalize if he is having pain but moans at times throughout session.  Unsafe to attempt standing at this time due to lethary, inconsistent command following and poor static  noted           Bed mobility  Supine to Sit: Dependent/Total;2 Person assistance (max assist x 2, head of bed elevated)  Sit to Supine: 2 Person assistance;Dependent/Total (total assist x 2; head of bed flat)  Scooting: Dependent/Total;2 Person assistance (to scoot toward head of bed in supine position)           Balance  Sitting - Static: Poor  Comments: patient sat EOB x 10-12 minutes; initially dependent for static sitting balance due to posterior and left sided trunk lean, improved to CGA/min assist for static sitting           OutComes Score                                                  AM-PAC - Mobility    -PAC Basic Mobility - Inpatient   How much help is needed turning from your back to your side while in a flat bed without using bedrails?: Total  How much help is needed moving from lying on your back to sitting on the side of a flat bed without using bedrails?: Total  How much help is needed moving to and from a bed to a chair?: Total  How much help is needed standing up from a chair using your arms?: Total  How much help is needed walking in hospital room?: Total  How much help is needed climbing 3-5 steps with a railing?: Total  AM-PAC Inpatient Mobility Raw Score : 6  AM-PAC Inpatient T-Scale Score : 23.55  Mobility Inpatient CMS 0-100% Score: 100  Mobility Inpatient CMS G-Code Modifier : CN         Tinneti Score       Goals  Short Term Goals  Time Frame for Short Term Goals: discharge  Short Term Goal 1: patient will perform bed mobility with moderate assistance  Short Term Goal 2: patient will sit EOB x 5 minutes with SBA for static sitting balance  Short Term Goal 3: patient will participate in AAROM exercises x 10 reps to BLEs  Patient Goals   Patient Goals : unable to state       Education  Patient Education  Education Given To: Patient  Education Provided: Role of Therapy  Education Method: Verbal  Barriers to Learning: Cognition  Education Outcome: Continued education needed      Therapy

## 2024-08-18 NOTE — PROGRESS NOTES
Occupational Therapy  Facility/Department: [facility] ORTHO/NEURO  Occupational Therapy Initial Assessment and Tx    Name: Brent Mina  : 1945  MRN: [id number]  Date of Service: 2024    Discharge Recommendations:  Subacute/Skilled Nursing Facility  OT Equipment Recommendations  Other: defer       Patient Diagnosis(es): skull fx with cerebellar contusion closed  Past Medical History:  has a past medical history of ADHD (attention deficit hyperactivity disorder), Alzheimer disease (HCC), Cancer (HCC), Hyperlipidemia, Hypertension, IFG (impaired fasting glucose), OCD (obsessive compulsive disorder), Osteoarthritis, Renal insufficiency, and TIA (transient ischemic attack).  Past Surgical History:  has a past surgical history that includes Colonoscopy (); Eye surgery; Prostatectomy; Intracapsular cataract extraction (Right, 10/21/2019); Intracapsular cataract extraction (Left, 10/31/2019); and Femur fracture surgery (Left, 2023).           Assessment   Performance deficits / Impairments: Decreased functional mobility ;Decreased balance;Decreased safe awareness;Decreased cognition;Decreased ADL status;Decreased endurance;Decreased strength;Decreased posture  Assessment: Pt is 79 y.o male who presents from assisted. Pt presents below baseline and demos above deficits impacting ADL performance. Pt requires assist of 2 for bed mobility and CGA to total A to maintain EOB. Pt requires SBA to Remi for feeding/grooming ADLs. Pt with dec cognition with eyes closed for most of session requiring v.cues for attention to task. Pt will benefit from continued skilled OT to address above deficits and progress towards PLOF.  Prognosis: Fair  Decision Making: Medium Complexity  REQUIRES OT FOLLOW-UP: Yes  Activity Tolerance  Activity Tolerance: Treatment limited secondary to decreased cognition        Plan   Occupational Therapy Plan  Times Per Week: 2-5x  Current Treatment Recommendations: Strengthening, Balance  X2  Balance  Sitting: Impaired (initially total A with posterior L lateral lean progressing to CGA to Remi, noted delayed righting reactions; tolerates EOB for ~10-12 mins; needs cues for eyes open)  Transfer Training  Transfer Training:  (not attempted 2/2 cognition/lethargy)     AROM: Generally decreased, functional (limited assessment 2/2 cognition)  PROM: Generally decreased, functional  Strength: Generally decreased, functional  Coordination: Generally decreased, functional  Tone: Normal  Sensation: Intact  ADL  Feeding: Minimal assistance;Scoop assist  Grooming: Contact guard assistance;Setup  Grooming Skilled Clinical Factors: wash face sitting EOB  LE Dressing: Dependent/Total  LE Dressing Skilled Clinical Factors: don socks  Skin Care: Bath wipes     Activity Tolerance  Activity Tolerance: Patient limited by endurance;Treatment limited secondary to decreased cognition        Vision  Vision:  (patient keeps eyes closed for most of session, is able to visually track to find pears on food tray with continuous verbal cues)  Hearing  Hearing: Within functional limits  Cognition  Overall Cognitive Status: Exceptions  Arousal/Alertness: Inconsistent responses to stimuli  Following Commands: Impaired;Inconsistently follows commands  Attention Span: Difficulty attending to directions;Unable to maintain attention  Memory: Impaired;Decreased recall of biographical Information  Safety Judgement: Decreased awareness of need for safety;Decreased awareness of need for assistance  Problem Solving: Decreased awareness of errors  Insights: Not aware of deficits  Initiation: Requires cues for all  Sequencing: Requires cues for all  Cognition Comment: baseline memory care; unsure if current cognition is baseline  Orientation  Overall Orientation Status: Impaired  Orientation Level: Oriented to person;Disoriented to time;Disoriented to situation;Disoriented to place                  Education Given To: Patient  Education

## 2024-08-19 PROCEDURE — 2580000003 HC RX 258: Performed by: INTERNAL MEDICINE

## 2024-08-19 PROCEDURE — 2060000000 HC ICU INTERMEDIATE R&B

## 2024-08-19 PROCEDURE — 6370000000 HC RX 637 (ALT 250 FOR IP): Performed by: NURSE PRACTITIONER

## 2024-08-19 RX ORDER — AMLODIPINE BESYLATE 10 MG/1
10 TABLET ORAL DAILY
Status: DISCONTINUED | OUTPATIENT
Start: 2024-08-19 | End: 2024-08-20 | Stop reason: HOSPADM

## 2024-08-19 RX ORDER — AMLODIPINE BESYLATE 5 MG/1
5 TABLET ORAL ONCE
Status: COMPLETED | OUTPATIENT
Start: 2024-08-19 | End: 2024-08-19

## 2024-08-19 RX ORDER — AMLODIPINE BESYLATE 10 MG/1
10 TABLET ORAL DAILY
Qty: 30 TABLET | Refills: 3 | Status: SHIPPED | OUTPATIENT
Start: 2024-08-20

## 2024-08-19 RX ADMIN — SODIUM CHLORIDE, PRESERVATIVE FREE 10 ML: 5 INJECTION INTRAVENOUS at 08:33

## 2024-08-19 RX ADMIN — AMLODIPINE BESYLATE 5 MG: 5 TABLET ORAL at 08:28

## 2024-08-19 RX ADMIN — AMLODIPINE BESYLATE 5 MG: 5 TABLET ORAL at 10:03

## 2024-08-19 RX ADMIN — ACETAMINOPHEN 650 MG: 325 TABLET ORAL at 10:31

## 2024-08-19 RX ADMIN — FLUOXETINE HYDROCHLORIDE 60 MG: 20 CAPSULE ORAL at 08:28

## 2024-08-19 RX ADMIN — ASPIRIN 81 MG: 81 TABLET, CHEWABLE ORAL at 08:28

## 2024-08-19 RX ADMIN — ACETAMINOPHEN 650 MG: 325 TABLET ORAL at 16:49

## 2024-08-19 RX ADMIN — ACETAMINOPHEN 650 MG: 325 TABLET ORAL at 03:41

## 2024-08-19 ASSESSMENT — PAIN SCALES - GENERAL
PAINLEVEL_OUTOF10: 0

## 2024-08-19 NOTE — PLAN OF CARE
Problem: Discharge Planning  Goal: Discharge to home or other facility with appropriate resources  Outcome: Progressing  Flowsheets (Taken 8/19/2024 0872)  Discharge to home or other facility with appropriate resources: Refer to discharge planning if patient needs post-hospital services based on physician order or complex needs related to functional status, cognitive ability or social support system     Problem: Pain  Goal: Verbalizes/displays adequate comfort level or baseline comfort level  Outcome: Progressing     Problem: Skin/Tissue Integrity  Goal: Absence of new skin breakdown  Description: 1.  Monitor for areas of redness and/or skin breakdown  2.  Assess vascular access sites hourly  3.  Every 4-6 hours minimum:  Change oxygen saturation probe site  4.  Every 4-6 hours:  If on nasal continuous positive airway pressure, respiratory therapy assess nares and determine need for appliance change or resting period.  Outcome: Progressing     Problem: Safety - Adult  Goal: Free from fall injury  Outcome: Progressing

## 2024-08-19 NOTE — DISCHARGE SUMMARY
V2.0  Discharge Summary    Name:  Brent Mina /Age/Sex: 1945 (79 y.o. male)   Admit Date: 2024  Discharge Date: 24    MRN & CSN:  [id number] & [id number] Encounter Date and Time 24 11:05 AM EDT    Attending:  Summer English MD Discharging Provider: ADRIANA Atkinson [facility] Course:     Brief HPI: Brent Mina is a 79 y.o. male who presented with with pmh of  of dementia, lives at memory care unit, HTN, ORIF of left hip in , now presenting with fall and  who presents with Skull fracture with cerebral contusion, closed     Brief Problem Based Course:   Skull fracture with cerebral contusion, closed, initial encounter ([facility])- fracture is non displaced and non depressed  Small scalp hematoma     Case with d/w NS who recommended transfer to [facility] from OSH and observation  Small laceration on occiput  CT C spine negative  CT head shows non displaced skull #  Monitoring;     Patient was evaluated by neurosurgery team on .  No intracranial injury therefore no surgery required for skull fracture at this time.  Patient is cleared for discharge from neurosurgery perspective on 2024 PT OT saw him requesting subacute rehab.  Orders        2. Dementia- chronic condition, oriented to self     Resume home meds  Obtain accurate home med rec     3. HTN- resume home meds      The patient expressed appropriate understanding of, and agreement with the discharge recommendations, medications, and plan.     Consults this admission:  IP CONSULT TO NEUROSURGERY    Discharge Diagnosis:   Skull fracture with cerebral contusion, closed, initial encounter ([facility])        Discharge Instruction:   Follow up appointments:   Primary care physician: Aravind Reed MD within 1 week  Diet: regular diet   Activity: activity as tolerated  Disposition: Discharged to:   []Home, []HHC, [x]SNF, []Acute Rehab, []Hospice   Condition on discharge: Stable  Labs and Tests to be Followed up as an  outpatient by PCP or Specialist:     Discharge Medications:        Medication List        START taking these medications      amLODIPine 10 MG tablet  Commonly known as: NORVASC  Take 1 tablet by mouth daily  Start taking on: August 20, 2024            CONTINUE taking these medications      acetaminophen 325 MG tablet  Commonly known as: TYLENOL  Take 2 tablets by mouth in the morning and 2 tablets at noon and 2 tablets in the evening and 2 tablets before bedtime.     aspirin 81 MG tablet     atorvastatin 40 MG tablet  Commonly known as: LIPITOR     fish oil 1000 MG capsule     FLUoxetine 20 MG capsule  Commonly known as: PROZAC     multivitamin per tablet     OSTEO BI-FLEX JOINT SHIELD PO     rivastigmine 6 MG capsule  Commonly known as: EXELON     vitamin C 250 MG tablet            STOP taking these medications      ATENOLOL PO     METAMUCIL PO     OLANZapine 5 MG tablet  Commonly known as: ZYPREXA               Where to Get Your Medications        These medications were sent to [facility] [id number] [facility] [address] - P [phone] - F [phone] [address] [facility] [address]      Phone: [phone]   amLODIPine 10 MG tablet        Objective Findings at Discharge:   BP (!) 183/76   Pulse 58   Temp 97.4 °F (36.3 °C) (Axillary)   Resp 16   Wt 64.8 kg (142 lb 13.7 oz)   SpO2 98%   BMI 19.38 kg/m²       Physical Exam:     General: NAD  Eyes: EOMI  ENT: neck supple  Cardiovascular: Regular rate.  Respiratory: Clear to auscultation  Gastrointestinal: Soft, non tender  Genitourinary: no suprapubic tenderness  Musculoskeletal: No edema  Skin: warm, dry  Neuro: Alert.  Psych: Mood appropriate.         Labs and Imaging   CT HEAD W WO CONTRAST    Result Date: 8/18/2024  CT abdomen with and without contrast HISTORY: Altered mental status. COMPARISON: June 24, 2023. August 16, 2024. Individualized dose optimization technique was used in order to meet ALARA standards for radiation

## 2024-08-19 NOTE — PROGRESS NOTES
Pt removed IV. RN notified provider inquiring if IV needed to replaced d/t pt not getting anything through IV, is DNR, and has discharge order. No answer from provider, IV replaced.

## 2024-08-19 NOTE — PROGRESS NOTES
Pt A&Ox 1 on RA. AM assessment and vitals completed and put into flowsheets. AM medications given with no s/s of aspiration. Pt with no questions or concerns voiced to RN at this time. Fall precautions in place and call light within reach.     Pt assisted with eating breakfast. Tolerated well.    Vitals:    08/19/24 0745   BP: (!) 183/76   Pulse: 58   Resp: 16   Temp: 97.4 °F (36.3 °C)   SpO2: 98%       Provider ordered increase in amlodipine dose after morning meds given. This RN messaged provider regarding if they want to give a one time dose of 5mg of amlodipine to get to new order of 10mg or if they would like full 10 mg of new order given. Awaiting response.

## 2024-08-19 NOTE — CARE COORDINATION
UPDATE: CM received a VM from Lakia at [facility] reporting they are unable to accept the pt. They did not have a positive experience of interactions between the pt and pt's wife at [facility] (pt's wife is a resident at [facility]). CM to reach out to family for additional SNF options.  Electronically signed by GURDEEP Sparks on 8/19/2024 at 4:25 PM  [phone]    CM following: CM spoke to Lakia at [facility] who reports she did not receive the referral for this pt. CM provided referral information and will review and reach back out to the CM with an answer. CM will continue to follow for discharge planning.  Electronically signed by GURDEEP Sparks on 8/19/2024 at 11:16 AM  [phone]

## 2024-08-19 NOTE — PROGRESS NOTES
Pt. Oriented to self only. VSS on RA with exception to elevated BP, no PRN coverage needed. No neuro changes overnight. Incontinence care as needed. All fall precautions in place and call light is within reach.

## 2024-08-19 NOTE — PLAN OF CARE
Problem: Pain  Goal: Verbalizes/displays adequate comfort level or baseline comfort level  8/19/2024 1923 by Kerri To, RN  Outcome: Progressing  Note: Pt. Managing pain per MAR.     Problem: Safety - Adult  Goal: Free from fall injury  8/19/2024 1923 by Kerri To, RN  Outcome: Progressing  Note: All fall precautions in place and call light is within reach.

## 2024-08-20 VITALS
HEART RATE: 92 BPM | WEIGHT: 141.31 LBS | DIASTOLIC BLOOD PRESSURE: 76 MMHG | TEMPERATURE: 97.6 F | OXYGEN SATURATION: 97 % | BODY MASS INDEX: 19.17 KG/M2 | SYSTOLIC BLOOD PRESSURE: 130 MMHG | RESPIRATION RATE: 18 BRPM

## 2024-08-20 PROCEDURE — 2580000003 HC RX 258: Performed by: INTERNAL MEDICINE

## 2024-08-20 PROCEDURE — 6370000000 HC RX 637 (ALT 250 FOR IP): Performed by: NURSE PRACTITIONER

## 2024-08-20 RX ADMIN — ACETAMINOPHEN 650 MG: 325 TABLET ORAL at 00:14

## 2024-08-20 RX ADMIN — ACETAMINOPHEN 650 MG: 325 TABLET ORAL at 05:54

## 2024-08-20 RX ADMIN — SODIUM CHLORIDE, PRESERVATIVE FREE 10 ML: 5 INJECTION INTRAVENOUS at 11:13

## 2024-08-20 RX ADMIN — ACETAMINOPHEN 650 MG: 325 TABLET ORAL at 11:11

## 2024-08-20 RX ADMIN — FLUOXETINE HYDROCHLORIDE 60 MG: 20 CAPSULE ORAL at 11:12

## 2024-08-20 RX ADMIN — AMLODIPINE BESYLATE 10 MG: 10 TABLET ORAL at 11:12

## 2024-08-20 RX ADMIN — ASPIRIN 81 MG: 81 TABLET, CHEWABLE ORAL at 11:12

## 2024-08-20 ASSESSMENT — PAIN SCALES - PAIN ASSESSMENT IN ADVANCED DEMENTIA (PAINAD)
TOTALSCORE: 0
BREATHING: NORMAL
BODYLANGUAGE: RELAXED
BREATHING: NORMAL
FACIALEXPRESSION: SMILING OR INEXPRESSIVE
TOTALSCORE: 0
TOTALSCORE: 0
CONSOLABILITY: NO NEED TO CONSOLE
BREATHING: NORMAL
CONSOLABILITY: NO NEED TO CONSOLE
FACIALEXPRESSION: SMILING OR INEXPRESSIVE
BODYLANGUAGE: RELAXED
BODYLANGUAGE: RELAXED
CONSOLABILITY: NO NEED TO CONSOLE
FACIALEXPRESSION: SMILING OR INEXPRESSIVE

## 2024-08-20 ASSESSMENT — PAIN SCALES - GENERAL
PAINLEVEL_OUTOF10: 0

## 2024-08-20 NOTE — PROGRESS NOTES
Discharge order received. Patient informed of discharge order. Discharge instructions sent with transport team IV and telemetry removed. All patient belongings packed and sent with patient upon discharge. Patient left on stretcher to ambulance for transport to [facility].     Right elbow was bumped when being pulled over onto stretcher by transport team. Mepilex applied to a small skin tear by this RN.  Soft tissues surrounding the were non tender. Called Tawana at [facility] to update.

## 2024-08-20 NOTE — PROGRESS NOTES
Pt. Oriented to self only. VSS on RA. Pt. Denies pain. Incontinence care provided as needed. All fall precautions in place and call light is within reach.

## 2024-08-20 NOTE — DISCHARGE INSTR - COC
Continuity of Care Form    Patient Name: Brent Mina   :  1945  MRN:  [id number]    Admit date:  2024  Discharge date:  ***    Code Status Order: DNR-CCA   Advance Directives:   Advance Care Flowsheet Documentation             Admitting Physician:  Levon Torres MD  PCP: Aravind Reed MD    Discharging Nurse: Ace De La Cruz RN  Discharging Hospital Unit/Room#: 5503/5503-01  Discharging Unit Phone Number: [phone]    Emergency Contact:   Extended Emergency Contact Information  Primary Emergency Contact: Latanya Mina  Address: [address] [place] [address]  Home Phone: [phone]  Mobile Phone: [phone]  Relation: Spouse  Secondary Emergency Contact: Drew Mina   [facility]  Home Phone: [phone]  Mobile Phone: [phone]  Relation: Child    Past Surgical History:  Past Surgical History:   Procedure Laterality Date    COLONOSCOPY      EYE SURGERY      FEMUR FRACTURE SURGERY Left 2023    LEFT HIP GAMMA NAILING WITH CABLES                  GUERITA performed by Niko Higgins MD at [facility] OR    INTRACAPSULAR CATARACT EXTRACTION Right 10/21/2019    PHACOEMULSIFICATION OF CATARACT RIGHT EYE WITH INTRAOCULAR LENS IMPLANT performed by Jose Che MD at [facility] OR    INTRACAPSULAR CATARACT EXTRACTION Left 10/31/2019    PHACOEMULSIFICATION OF CATARACT LEFT EYE WITH INTRAOCULAR LENS IMPLANT performed by Jose Che MD at [facility] OR    PROSTATECTOMY         Immunization History:   Immunization History   Administered Date(s) Administered    COVID-19, MODERNA BLUE border, Primary or Immunocompromised, (age 12y+), IM, 100 mcg/0.5mL 2021    Influenza 2012    Influenza Virus Vaccine 2013    Influenza, FLUZONE High Dose, (age 65 y+), IM, Trivalent PF, 0.5mL 2015    Pneumococcal Conjugate 7-valent (Prevnar7) 2011    TDaP, ADACEL (age 10y-64y), BOOSTRIX (age 10y+), IM, 0.5mL 2006    Zoster  Etiology Traumatic 08/20/24 0800   Dressing Status Other (Comment) 08/17/24 0917   Wound Assessment Dry 08/20/24 0800   Drainage Amount None (dry) 08/20/24 0800   Odor None 08/20/24 0800   Number of days: 3        Elimination:  Continence:   Bowel: No  Bladder: No  Urinary Catheter: None   Colostomy/Ileostomy/Ileal Conduit: No       Date of Last BM:     Intake/Output Summary (Last 24 hours) at 8/20/2024 1246  Last data filed at 8/20/2024 1124  Gross per 24 hour   Intake 690 ml   Output 700 ml   Net -10 ml     I/O last 3 completed shifts:  In: 560 [P.O.:560]  Out: 700 [Urine:700]    Safety Concerns:     History of Falls (last 30 days) and At Risk for Falls    Impairments/Disabilities:      None    Nutrition Therapy:  Current Nutrition Therapy:   - Oral Diet:  General    Routes of Feeding: Oral  Liquids: Thin Liquids  Daily Fluid Restriction: no  Last Modified Barium Swallow with Video (Video Swallowing Test): not done    Treatments at the Time of Hospital Discharge:   Respiratory Treatments:   Oxygen Therapy:  is not on home oxygen therapy.  Ventilator:    - No ventilator support    Rehab Therapies: Physical Therapy and Occupational Therapy  Weight Bearing Status/Restrictions: No weight bearing restrictions  Other Medical Equipment (for information only, NOT a DME order):  walker  Other Treatments:     Patient's personal belongings (please select all that are sent with patient):  Shoes, shirt, pants, socks and admission chart stated his glasses are at home.      RN SIGNATURE:  Electronically signed by Maeve De La Cruz RN on 8/20/24 at 3:29 PM EDT    CASE MANAGEMENT/SOCIAL WORK SECTION    Inpatient Status Date: ***    Readmission Risk Assessment Score:  Readmission Risk              Risk of Unplanned Readmission:  11           Discharging to Facility/ Agency   Name: [facility]  Address: [address] [place] [address]  Phone: [phone]    / signature: Electronically signed by Evie Forde RN

## 2024-08-20 NOTE — CARE COORDINATION
Case Management Assessment            Discharge Note                    Date / Time of Note: 8/20/2024 12:48 PM                  Discharge Note Completed by: Evie Forde RN    Patient Name: Bernt Mina   YOB: 1945  Diagnosis: Skull fracture with cerebral contusion, closed, initial encounter ([facility]) [S02.91XA, S06.33AA]   Date / Time: 8/16/2024  9:47 PM    Current PCP: Aravind Reed MD  Clinic patient: No    Hospitalization in the last 30 days: No       Advance Directives:  Code Status: DNR-CCA  [place] DNR form completed and on chart: Yes    Financial:  Payor: MEDICARE / Plan: MEDICARE PART A AND B / Product Type: *No Product type* /      Pharmacy:    [facility] [id number] - [facility] [address] [place] [address] -  [phone] - F [phone]  [address] [facility] [address]  Phone: [phone] Fax: [phone]    [facility] - [address] -  [phone] - F [phone]  [address] [facility] [address]  Phone: [phone] Fax: [phone]      Assistance purchasing medications?:    Assistance provided by Case Management: None at this time    Does patient want to participate in local refill/ meds to beds program?:      Meds To Beds General Rules:  1. Can ONLY be done Monday- Friday between 8:30am-5pm  2. Prescription(s) must be in pharmacy by 3pm to be filled same day  3.Copy of patient's insurance/ prescription drug card and patient face sheet must be sent along with the prescription(s)  4. Cost of Rx cannot be added to hospital bill. If financial assistance is needed, please contact unit  or ;  or  CANNOT provide pharmacy voucher for patients co-pays  5. Patients can then  the prescription on their way out of the hospital at discharge, or pharmacy can deliver to the bedside if staff is available. (payment due at time of pick-up or delivery - cash, check, or  card accepted)     Able to afford home medications/ co-pay costs: Yes    ADLS:  Current PT AM-PAC Score: 6 /24  Current OT AM-PAC Score: 13 /24    DISCHARGE Disposition: Skilled Nursing Facility (SNF): [facility] Phone: [phone] Fax: [phone]    LOC at discharge: Skilled  ROBERTO Completed: Yes    Notification completed in HENS/PAS?:  Yes : CM has completed HENS online through secure website for SNF admission at [facility].   Document ID #: [id number]    IMM Completed:   Yes, Case management has presented and reviewed IMM letter #2 to the patient and/or family/ POA. Patient and/or family/POA verbalized understanding of their medicare rights and appeal process if needed. Patient and/or family/POA verbalized understanding of DC within 4 hours of signing IMM letter #2. Patient and/or family/POA has signed and placed today's date (8/20/24) and time (1355) on IMM letter #2 on the the appropriate lines. Patient and/or family/POA, provided copy of letter and they are aware that original copy of IMM letter #2 is available prior to discharge from the paper chart on the unit.  Electronic documentation has been entered into epic for IMM letter #2 and original paper copy has been added to the paper chart at the nurses station.CM provided and explained IMM and 4 hour window to Pt's spouse Latanya . They expressed understanding of their rights to appeal and that they may/will leave the hospital w/o having received the IMM letter 4 hours prior to DC. CM provided Pt's spouse Latanya  a copy of IMM and placed original signed copy on the paperchart. Pt's spouse Latanya  is in agreement w/DC to SNF  today.             Transportation:  Transportation PLAN for discharge: EMS transportation   Mode of Transport: Ambulance stretcher - BLS  Reason for medical transport: Confused due to dementia  and requires ambulance transport due to needs supervision  Name of Transport Company: [employer]  Phone: [phone]  Time  of Transport: 1600    Transport form completed: Yes    Home Care:  Home Care ordered at discharge: Not Indicated  Home Care Agency: Not Applicable  Orders faxed: No    Durable Medical Equipment:  DME Provider: n/a  Equipment obtained during hospitalization: n/a      Additional CM Notes: Patient is discharging to SNF at [facility].  Orders faxed to [phone], nurse to call report to [phone].  Patient is scheduled for transport at 1600 via [facility].  Patient's wife Latanya aware and agreeable to plan.    COVID Result:    Lab Results   Component Value Date/Time    COVID19 DETECTED 01/12/2024 01:12 PM       The Plan for Transition of Care is related to the following treatment goals of Skull fracture with cerebral contusion, closed, initial encounter (HCC) [S02.91XA, S06.33AA]    The Patient and/or patient representative Brent and his family were provided with a choice of provider and agrees with the discharge plan Yes    Freedom of choice list was provided with basic dialogue that supports the patient's individualized plan of care/goals and shares the quality data associated with the providers. Yes    Care Transitions patient: No    Evie Forde RN  [facility]  Case Management Department  Ph: [phone]  Fax: [phone]

## 2024-08-20 NOTE — PROGRESS NOTES
No acute events overnight.  Awaiting placement    Vitals:    08/20/24 0815   BP: (!) 146/78   Pulse:    Resp: 16   Temp: 97.5 °F (36.4 °C)   SpO2: 99%     Electronically signed by ADRIANA Gilbert CNP on 8/20/2024 at 10:54 AM

## 2024-08-20 NOTE — PROGRESS NOTES
Called report to Tawana at the [facility]. Provided number for any further questions.

## 2024-08-20 NOTE — PLAN OF CARE
Problem: Pain  Goal: Verbalizes/displays adequate comfort level or baseline comfort level  Outcome: Progressing   No evidence of pain per the Advance dementia scale.    Problem: Skin/Tissue Integrity  Goal: Absence of new skin breakdown  Description: 1.  Monitor for areas of redness and/or skin breakdown  2.  Assess vascular access sites hourly  3.  Every 4-6 hours minimum:  Change oxygen saturation probe site  4.  Every 4-6 hours:  If on nasal continuous positive airway pressure, respiratory therapy assess nares and determine need for appliance change or resting period.  Outcome: Progressing   No new skin issues at this time.     Problem: Safety - Adult  Goal: Free from fall injury  Outcome: Progressing   Patient at risk for falls. Patient resting quietly in bed. Side rails up x 2. Bed locked in lowest position. Bed alarm on. Bedside table and call light within reach. Patient instructed to call for assistance. Patient verbalized understanding. Will continue to monitor.

## 2024-08-28 NOTE — PROGRESS NOTES
Physician Progress Note      PATIENT:               ROMEO SAUNDERS  [facility] #:                  [id number]  :                       1945  ADMIT DATE:       2024 9:47 PM  DISCH DATE:        2024 4:01 PM  RESPONDING  PROVIDER #:        ROXANA MARTINEZ          QUERY TEXT:    Patient admitted with skull fracture. Noted documentation of cerebral   contusion in  HP. In order to support the diagnosis of cerebral contusion,   please include additional clinical indicators in your documentation.  Or   please document if the diagnosis of cerebral contusion has been ruled out   after further study.    The medical record reflects the following:  Risk Factors: s/p fall with skull fracture    Clinical Indicators: Per  HP- Skull fracture with cerebral contusion,   closed, initial encounter (HCC)- fracture is non displaced and non depressed.  Small scalp hematoma.   CT Head- Small scalp hematoma along the right occipital region.    Treatment: Imaging, NS consult  Options provided:  -- Cerebral contusion present as evidenced by, Please document evidence.  -- Cerebral contusion ruled out, scalp hematoma confirmed  -- Other - I will add my own diagnosis  -- Disagree - Not applicable / Not valid  -- Disagree - Clinically unable to determine / Unknown  -- Refer to Clinical Documentation Reviewer    PROVIDER RESPONSE TEXT:    Cerebral contusion ruled out, scalp hematoma confirmed    Query created by: Ritika Valencia on 2024 1:08 PM      Electronically signed by:  ROXANA MARTINEZ 2024 8:32 AM

## 2024-11-09 ENCOUNTER — APPOINTMENT (OUTPATIENT)
Dept: CT IMAGING | Age: 79
DRG: 699 | End: 2024-11-09
Payer: MEDICARE

## 2024-11-09 ENCOUNTER — HOSPITAL ENCOUNTER (EMERGENCY)
Age: 79
Discharge: HOME | DRG: 699 | End: 2024-11-10
Attending: EMERGENCY MEDICINE
Payer: MEDICARE

## 2024-11-09 DIAGNOSIS — R93.41 ABNORMAL CT SCAN, BLADDER: ICD-10-CM

## 2024-11-09 DIAGNOSIS — N39.0 URINARY TRACT INFECTION WITH HEMATURIA, SITE UNSPECIFIED: ICD-10-CM

## 2024-11-09 DIAGNOSIS — R31.0 GROSS HEMATURIA: Primary | ICD-10-CM

## 2024-11-09 DIAGNOSIS — R31.9 URINARY TRACT INFECTION WITH HEMATURIA, SITE UNSPECIFIED: ICD-10-CM

## 2024-11-09 LAB
ALBUMIN SERPL-MCNC: 3.6 G/DL (ref 3.4–5)
ALBUMIN/GLOB SERPL: 1.6 {RATIO} (ref 1.1–2.2)
ALP SERPL-CCNC: 63 U/L (ref 40–129)
ALT SERPL-CCNC: <5 U/L (ref 10–40)
ANION GAP SERPL CALCULATED.3IONS-SCNC: 11 MMOL/L (ref 3–16)
AST SERPL-CCNC: 19 U/L (ref 15–37)
BASOPHILS # BLD: 0.1 K/UL (ref 0–0.2)
BASOPHILS NFR BLD: 1 %
BILIRUB SERPL-MCNC: 0.3 MG/DL (ref 0–1)
BILIRUB UR QL STRIP.AUTO: ABNORMAL
BUN SERPL-MCNC: 27 MG/DL (ref 7–20)
CALCIUM SERPL-MCNC: 8.5 MG/DL (ref 8.3–10.6)
CHARACTER UR: ABNORMAL
CHLORIDE SERPL-SCNC: 104 MMOL/L (ref 99–110)
CLARITY UR: ABNORMAL
CO2 SERPL-SCNC: 23 MMOL/L (ref 21–32)
COLOR UR: ABNORMAL
CREAT SERPL-MCNC: 0.8 MG/DL (ref 0.8–1.3)
DEPRECATED RDW RBC AUTO: 13.6 % (ref 12.4–15.4)
EOSINOPHIL # BLD: 0.1 K/UL (ref 0–0.6)
EOSINOPHIL NFR BLD: 1.6 %
GFR SERPLBLD CREATININE-BSD FMLA CKD-EPI: 90 ML/MIN/{1.73_M2}
GLUCOSE SERPL-MCNC: 97 MG/DL (ref 70–99)
GLUCOSE UR STRIP.AUTO-MCNC: NEGATIVE MG/DL
HCT VFR BLD AUTO: 32.4 % (ref 40.5–52.5)
HGB BLD-MCNC: 11.1 G/DL (ref 13.5–17.5)
HGB UR QL STRIP.AUTO: ABNORMAL
KETONES UR STRIP.AUTO-MCNC: 15 MG/DL
LEUKOCYTE ESTERASE UR QL STRIP.AUTO: ABNORMAL
LYMPHOCYTES # BLD: 1 K/UL (ref 1–5.1)
LYMPHOCYTES NFR BLD: 16 %
MCH RBC QN AUTO: 31.9 PG (ref 26–34)
MCHC RBC AUTO-ENTMCNC: 34.2 G/DL (ref 31–36)
MCV RBC AUTO: 93.2 FL (ref 80–100)
MONOCYTES # BLD: 0.6 K/UL (ref 0–1.3)
MONOCYTES NFR BLD: 9.4 %
NEUTROPHILS # BLD: 4.4 K/UL (ref 1.7–7.7)
NEUTROPHILS NFR BLD: 72 %
NITRITE UR QL STRIP.AUTO: POSITIVE
PH UR STRIP.AUTO: 6.5 [PH] (ref 5–8)
PLATELET # BLD AUTO: 276 K/UL (ref 135–450)
PMV BLD AUTO: 7.5 FL (ref 5–10.5)
POTASSIUM SERPL-SCNC: 4.6 MMOL/L (ref 3.5–5.1)
PROT SERPL-MCNC: 5.9 G/DL (ref 6.4–8.2)
PROT UR STRIP.AUTO-MCNC: >=300 MG/DL
RBC # BLD AUTO: 3.48 M/UL (ref 4.2–5.9)
RBC #/AREA URNS HPF: >100 /HPF (ref 0–4)
SODIUM SERPL-SCNC: 138 MMOL/L (ref 136–145)
SP GR UR STRIP.AUTO: 1.02 (ref 1–1.03)
UA DIPSTICK W REFLEX MICRO PNL UR: YES
URN SPEC COLLECT METH UR: ABNORMAL
UROBILINOGEN UR STRIP-ACNC: 2 E.U./DL
WBC # BLD AUTO: 6.1 K/UL (ref 4–11)
WBC #/AREA URNS HPF: ABNORMAL /HPF (ref 0–5)

## 2024-11-09 PROCEDURE — 36415 COLL VENOUS BLD VENIPUNCTURE: CPT

## 2024-11-09 PROCEDURE — 85025 COMPLETE CBC W/AUTO DIFF WBC: CPT

## 2024-11-09 PROCEDURE — 80053 COMPREHEN METABOLIC PANEL: CPT

## 2024-11-09 PROCEDURE — 99285 EMERGENCY DEPT VISIT HI MDM: CPT

## 2024-11-09 PROCEDURE — 81001 URINALYSIS AUTO W/SCOPE: CPT

## 2024-11-09 RX ORDER — IOPAMIDOL 755 MG/ML
75 INJECTION, SOLUTION INTRAVASCULAR
Status: COMPLETED | OUTPATIENT
Start: 2024-11-09 | End: 2024-11-10

## 2024-11-09 ASSESSMENT — PAIN - FUNCTIONAL ASSESSMENT: PAIN_FUNCTIONAL_ASSESSMENT: NONE - DENIES PAIN

## 2024-11-10 ENCOUNTER — APPOINTMENT (OUTPATIENT)
Dept: CT IMAGING | Age: 79
DRG: 699 | End: 2024-11-10
Payer: MEDICARE

## 2024-11-10 VITALS
TEMPERATURE: 97.9 F | HEART RATE: 55 BPM | DIASTOLIC BLOOD PRESSURE: 68 MMHG | SYSTOLIC BLOOD PRESSURE: 151 MMHG | RESPIRATION RATE: 16 BRPM | OXYGEN SATURATION: 100 %

## 2024-11-10 PROCEDURE — 2580000003 HC RX 258: Performed by: NURSE PRACTITIONER

## 2024-11-10 PROCEDURE — 6360000002 HC RX W HCPCS: Performed by: NURSE PRACTITIONER

## 2024-11-10 PROCEDURE — 6360000004 HC RX CONTRAST MEDICATION: Performed by: NURSE PRACTITIONER

## 2024-11-10 PROCEDURE — 96365 THER/PROPH/DIAG IV INF INIT: CPT

## 2024-11-10 PROCEDURE — 74177 CT ABD & PELVIS W/CONTRAST: CPT

## 2024-11-10 RX ORDER — CIPROFLOXACIN 500 MG/1
500 TABLET, FILM COATED ORAL 2 TIMES DAILY
Qty: 14 TABLET | Refills: 0 | Status: ON HOLD | OUTPATIENT
Start: 2024-11-10 | End: 2024-11-18 | Stop reason: HOSPADM

## 2024-11-10 RX ADMIN — CEFTRIAXONE SODIUM 1000 MG: 1 INJECTION, POWDER, FOR SOLUTION INTRAMUSCULAR; INTRAVENOUS at 00:39

## 2024-11-10 RX ADMIN — IOPAMIDOL 75 ML: 755 INJECTION, SOLUTION INTRAVENOUS at 00:13

## 2024-11-10 NOTE — ED NOTES
Mary Kay at [facility] called nurse and updated her. Went over discharge instructions and antibiotics

## 2024-11-10 NOTE — DISCHARGE INSTRUCTIONS
Your CT scan showed an abnormality in your bladder.  It is possible a blood clot however a mass cannot be ruled out.  Follow-up with your primary care provider for further evaluation of this.

## 2024-11-10 NOTE — ED NOTES
Report call to patient's ECF, reviewed AVS, follow up care, and prescription with ECF nurse. Nurse voiced understanding.

## 2024-11-10 NOTE — ED PROVIDER NOTES
[facility]  ED  EMERGENCY DEPARTMENT ENCOUNTER        Pt Name: Brent Mina  MRN: [id number]  Birthdate 1945  Date of evaluation: 11/9/2024  Provider: ADRIANA Arizmendi - CNP  PCP: Aravind Reed MD  Note Started: 10:42 PM EST 11/9/24       I have seen and evaluated this patient with my supervising physician Dr. Ponce      CHIEF COMPLAINT       Chief Complaint   Patient presents with    Hematuria     Per EMS SNF staff stated noted blood in urine this afternoon. This evening patient incontinent of urine. Not normal for patient. From Memory care at [place]       HISTORY OF PRESENT ILLNESS: 1 or more Elements     History From: nursing home  Limitations to history : None    Brent Mina is a 79 y.o. male who presents to the ER from nursing facility with complaints of blood in his urine, nursing staff noted that he was incontinent of urine he is from a memory care unit in a nursing facility.  Patient denied any pain, he does not appear to be distressed.  He is here for further evaluation.    Nursing Notes were all reviewed and agreed with or any disagreements were addressed in the HPI.    REVIEW OF SYSTEMS :      Review of Systems    Positives and Pertinent negatives as per HPI.     SURGICAL HISTORY     Past Surgical History:   Procedure Laterality Date    COLONOSCOPY  2004    EYE SURGERY      FEMUR FRACTURE SURGERY Left 4/20/2023    LEFT HIP GAMMA NAILING WITH CABLES                  GUERITA performed by Niko Higgins MD at [facility] OR    INTRACAPSULAR CATARACT EXTRACTION Right 10/21/2019    PHACOEMULSIFICATION OF CATARACT RIGHT EYE WITH INTRAOCULAR LENS IMPLANT performed by Jose Che MD at [facility] OR    INTRACAPSULAR CATARACT EXTRACTION Left 10/31/2019    PHACOEMULSIFICATION OF CATARACT LEFT EYE WITH INTRAOCULAR LENS IMPLANT performed by Jose Che MD at [facility] OR    PROSTATECTOMY         CURRENTMEDICATIONS       Discharge Medication List as of 11/10/2024  1:27 AM

## 2024-11-10 NOTE — ED PROVIDER NOTES
[facility]  ED     EMERGENCY DEPARTMENT ENCOUNTER     Location: [facility]  ED  11/9/2024  Note Started: 4:22 AM EST 11/10/24      Patient Identification  Brent Mina is a 79 y.o. male      HPI:Brent Mina was evaluated in the Emergency Department for hematuria. Although initial history and physical exam information was obtained by JUSTIN/NPP/MD/ (who also dictated a record of this visit), I personally saw the patient and performed and made/approved the management plan and take responsibility for the patient management.      PHYSICAL EXAM:  Adult male, bradycardic, normal respiration, normal oxygen saturation.  Hypertensive    EKG Interpretation      Patient seen and evaluated.  Relevant records reviewed.  MDM: Adult male who comes in from nursing facility for hematuria.  Diagnostic workup is performed and does reveal gross hematuria with possible urinary tract infection.  CT scan of the abdomen pelvis done and there is what appears to be a clot versus a bladder mass.  This point I believe the patient can be discharged home.  He will be given antibiotics for home.  Outpatient follow-up recommended to determine further approach to the bladder.    I independently interpreted the following studies:     I personally discussed the patients care with     CLINICAL IMPRESSION  1. Gross hematuria    2. Urinary tract infection with hematuria, site unspecified    3. Abnormal CT scan, bladder          I, Cesia Ponce MD, am the primary clinician of record.   I personally saw the patient and independently provided 0 minutes of non-concurrent critical care out of the total shared critical care time excluding separately billable procedures.    This chart was generated in part by using Dragon Dictation system and may contain errors related to that system including errors in grammar, punctuation, and spelling, as well as words and phrases that may be inappropriate. If there are any

## 2024-11-12 ENCOUNTER — HOSPITAL ENCOUNTER (INPATIENT)
Age: 79
LOS: 7 days | Discharge: NURSING, REHAB OR LONG-TERM CARE | DRG: 699 | End: 2024-11-19
Attending: EMERGENCY MEDICINE | Admitting: INTERNAL MEDICINE
Payer: MEDICARE

## 2024-11-12 DIAGNOSIS — R31.0 GROSS HEMATURIA: Primary | ICD-10-CM

## 2024-11-12 PROBLEM — D62 ACUTE BLOOD LOSS ANEMIA (ABLA): Status: ACTIVE | Noted: 2024-11-12

## 2024-11-12 LAB
ABO + RH BLD: NORMAL
ANION GAP SERPL CALCULATED.3IONS-SCNC: 13 MMOL/L (ref 3–16)
BASOPHILS # BLD: 0.1 K/UL (ref 0–0.2)
BASOPHILS NFR BLD: 0.8 %
BILIRUB UR QL STRIP.AUTO: ABNORMAL
BLD GP AB SCN SERPL QL: NORMAL
BUN SERPL-MCNC: 33 MG/DL (ref 7–20)
CALCIUM SERPL-MCNC: 8.9 MG/DL (ref 8.3–10.6)
CHLORIDE SERPL-SCNC: 104 MMOL/L (ref 99–110)
CLARITY UR: ABNORMAL
CO2 SERPL-SCNC: 23 MMOL/L (ref 21–32)
COLOR UR: ABNORMAL
CREAT SERPL-MCNC: 1.2 MG/DL (ref 0.8–1.3)
DEPRECATED RDW RBC AUTO: 13.8 % (ref 12.4–15.4)
EOSINOPHIL # BLD: 0.1 K/UL (ref 0–0.6)
EOSINOPHIL NFR BLD: 0.7 %
GFR SERPLBLD CREATININE-BSD FMLA CKD-EPI: 61 ML/MIN/{1.73_M2}
GLUCOSE SERPL-MCNC: 121 MG/DL (ref 70–99)
GLUCOSE UR STRIP.AUTO-MCNC: ABNORMAL MG/DL
HCT VFR BLD AUTO: 24.5 % (ref 40.5–52.5)
HGB BLD-MCNC: 8.2 G/DL (ref 13.5–17.5)
HGB UR QL STRIP.AUTO: ABNORMAL
INR PPP: 0.97 (ref 0.85–1.15)
KETONES UR STRIP.AUTO-MCNC: ABNORMAL MG/DL
LEUKOCYTE ESTERASE UR QL STRIP.AUTO: ABNORMAL
LYMPHOCYTES # BLD: 0.9 K/UL (ref 1–5.1)
LYMPHOCYTES NFR BLD: 9.7 %
MCH RBC QN AUTO: 31.8 PG (ref 26–34)
MCHC RBC AUTO-ENTMCNC: 33.6 G/DL (ref 31–36)
MCV RBC AUTO: 94.6 FL (ref 80–100)
MONOCYTES # BLD: 0.7 K/UL (ref 0–1.3)
MONOCYTES NFR BLD: 7.7 %
NEUTROPHILS # BLD: 7.2 K/UL (ref 1.7–7.7)
NEUTROPHILS NFR BLD: 81.1 %
NITRITE UR QL STRIP.AUTO: ABNORMAL
PH UR STRIP.AUTO: ABNORMAL [PH] (ref 5–8)
PLATELET # BLD AUTO: 261 K/UL (ref 135–450)
PLATELET BLD QL SMEAR: ADEQUATE
PMV BLD AUTO: 8.5 FL (ref 5–10.5)
POTASSIUM SERPL-SCNC: 4.4 MMOL/L (ref 3.5–5.1)
PROT UR STRIP.AUTO-MCNC: ABNORMAL MG/DL
PROTHROMBIN TIME: 13.1 SEC (ref 11.9–14.9)
RBC # BLD AUTO: 2.59 M/UL (ref 4.2–5.9)
RBC #/AREA URNS HPF: >100 /HPF (ref 0–4)
SLIDE REVIEW: ABNORMAL
SODIUM SERPL-SCNC: 140 MMOL/L (ref 136–145)
SP GR UR STRIP.AUTO: ABNORMAL (ref 1–1.03)
UA COMPLETE W REFLEX CULTURE PNL UR: ABNORMAL
UA DIPSTICK W REFLEX MICRO PNL UR: YES
URN SPEC COLLECT METH UR: ABNORMAL
UROBILINOGEN UR STRIP-ACNC: ABNORMAL E.U./DL
WBC # BLD AUTO: 8.9 K/UL (ref 4–11)
WBC #/AREA URNS HPF: ABNORMAL /HPF (ref 0–5)

## 2024-11-12 PROCEDURE — 1200000000 HC SEMI PRIVATE

## 2024-11-12 PROCEDURE — 85610 PROTHROMBIN TIME: CPT

## 2024-11-12 PROCEDURE — 87077 CULTURE AEROBIC IDENTIFY: CPT

## 2024-11-12 PROCEDURE — 36415 COLL VENOUS BLD VENIPUNCTURE: CPT

## 2024-11-12 PROCEDURE — 80048 BASIC METABOLIC PNL TOTAL CA: CPT

## 2024-11-12 PROCEDURE — 99285 EMERGENCY DEPT VISIT HI MDM: CPT

## 2024-11-12 PROCEDURE — 81001 URINALYSIS AUTO W/SCOPE: CPT

## 2024-11-12 PROCEDURE — 6370000000 HC RX 637 (ALT 250 FOR IP): Performed by: NURSE PRACTITIONER

## 2024-11-12 PROCEDURE — 86900 BLOOD TYPING SEROLOGIC ABO: CPT

## 2024-11-12 PROCEDURE — 86901 BLOOD TYPING SEROLOGIC RH(D): CPT

## 2024-11-12 PROCEDURE — 87086 URINE CULTURE/COLONY COUNT: CPT

## 2024-11-12 PROCEDURE — 86850 RBC ANTIBODY SCREEN: CPT

## 2024-11-12 PROCEDURE — 6360000002 HC RX W HCPCS: Performed by: EMERGENCY MEDICINE

## 2024-11-12 PROCEDURE — 85025 COMPLETE CBC W/AUTO DIFF WBC: CPT

## 2024-11-12 PROCEDURE — 2580000003 HC RX 258: Performed by: NURSE PRACTITIONER

## 2024-11-12 PROCEDURE — 96374 THER/PROPH/DIAG INJ IV PUSH: CPT

## 2024-11-12 RX ORDER — ATORVASTATIN CALCIUM 40 MG/1
40 TABLET, FILM COATED ORAL NIGHTLY
Status: DISCONTINUED | OUTPATIENT
Start: 2024-11-12 | End: 2024-11-19 | Stop reason: HOSPADM

## 2024-11-12 RX ORDER — MORPHINE SULFATE 4 MG/ML
4 INJECTION, SOLUTION INTRAMUSCULAR; INTRAVENOUS ONCE
Status: COMPLETED | OUTPATIENT
Start: 2024-11-12 | End: 2024-11-12

## 2024-11-12 RX ORDER — SODIUM CHLORIDE 0.9 % (FLUSH) 0.9 %
5-40 SYRINGE (ML) INJECTION PRN
Status: DISCONTINUED | OUTPATIENT
Start: 2024-11-12 | End: 2024-11-19 | Stop reason: HOSPADM

## 2024-11-12 RX ORDER — LIDOCAINE HYDROCHLORIDE 20 MG/ML
JELLY TOPICAL PRN
Status: DISCONTINUED | OUTPATIENT
Start: 2024-11-12 | End: 2024-11-19 | Stop reason: HOSPADM

## 2024-11-12 RX ORDER — ACETAMINOPHEN 325 MG/1
650 TABLET ORAL EVERY 6 HOURS PRN
Status: DISCONTINUED | OUTPATIENT
Start: 2024-11-12 | End: 2024-11-19 | Stop reason: HOSPADM

## 2024-11-12 RX ORDER — RIVASTIGMINE TARTRATE 1.5 MG/1
6 CAPSULE ORAL 2 TIMES DAILY
Status: DISCONTINUED | OUTPATIENT
Start: 2024-11-12 | End: 2024-11-19 | Stop reason: HOSPADM

## 2024-11-12 RX ORDER — SODIUM CHLORIDE 0.9 % (FLUSH) 0.9 %
5-40 SYRINGE (ML) INJECTION EVERY 12 HOURS SCHEDULED
Status: DISCONTINUED | OUTPATIENT
Start: 2024-11-12 | End: 2024-11-19 | Stop reason: HOSPADM

## 2024-11-12 RX ORDER — AMLODIPINE BESYLATE 5 MG/1
10 TABLET ORAL DAILY
Status: DISCONTINUED | OUTPATIENT
Start: 2024-11-12 | End: 2024-11-19 | Stop reason: HOSPADM

## 2024-11-12 RX ORDER — M-VIT,TX,IRON,MINS/CALC/FOLIC 27MG-0.4MG
1 TABLET ORAL DAILY
Status: DISCONTINUED | OUTPATIENT
Start: 2024-11-12 | End: 2024-11-19 | Stop reason: HOSPADM

## 2024-11-12 RX ORDER — CIPROFLOXACIN 2 MG/ML
400 INJECTION, SOLUTION INTRAVENOUS ONCE
Status: COMPLETED | OUTPATIENT
Start: 2024-11-12 | End: 2024-11-12

## 2024-11-12 RX ORDER — PROCHLORPERAZINE EDISYLATE 5 MG/ML
10 INJECTION INTRAMUSCULAR; INTRAVENOUS EVERY 6 HOURS PRN
Status: DISCONTINUED | OUTPATIENT
Start: 2024-11-12 | End: 2024-11-19 | Stop reason: HOSPADM

## 2024-11-12 RX ORDER — ACETAMINOPHEN 650 MG/1
650 SUPPOSITORY RECTAL EVERY 6 HOURS PRN
Status: DISCONTINUED | OUTPATIENT
Start: 2024-11-12 | End: 2024-11-19 | Stop reason: HOSPADM

## 2024-11-12 RX ORDER — POLYETHYLENE GLYCOL 3350 17 G/17G
17 POWDER, FOR SOLUTION ORAL DAILY PRN
Status: DISCONTINUED | OUTPATIENT
Start: 2024-11-12 | End: 2024-11-19 | Stop reason: HOSPADM

## 2024-11-12 RX ORDER — CIPROFLOXACIN 500 MG/1
500 TABLET, FILM COATED ORAL 2 TIMES DAILY
Status: ACTIVE | OUTPATIENT
Start: 2024-11-13 | End: 2024-11-18

## 2024-11-12 RX ORDER — SODIUM CHLORIDE 9 MG/ML
INJECTION, SOLUTION INTRAVENOUS PRN
Status: DISCONTINUED | OUTPATIENT
Start: 2024-11-12 | End: 2024-11-19 | Stop reason: HOSPADM

## 2024-11-12 RX ADMIN — ATORVASTATIN CALCIUM 40 MG: 40 TABLET, FILM COATED ORAL at 21:56

## 2024-11-12 RX ADMIN — FLUOXETINE HYDROCHLORIDE 60 MG: 20 CAPSULE ORAL at 21:56

## 2024-11-12 RX ADMIN — CIPROFLOXACIN 400 MG: 400 INJECTION, SOLUTION INTRAVENOUS at 19:43

## 2024-11-12 RX ADMIN — SODIUM CHLORIDE, PRESERVATIVE FREE 10 ML: 5 INJECTION INTRAVENOUS at 22:51

## 2024-11-12 RX ADMIN — RIVASTIGMINE TARTRATE 6 MG: 1.5 CAPSULE ORAL at 22:48

## 2024-11-12 RX ADMIN — AMLODIPINE BESYLATE 10 MG: 5 TABLET ORAL at 21:56

## 2024-11-12 RX ADMIN — MORPHINE SULFATE 4 MG: 4 INJECTION, SOLUTION INTRAMUSCULAR; INTRAVENOUS at 19:20

## 2024-11-12 NOTE — ED PROVIDER NOTES
Emergency Department Provider Note  Location: [facility]  ED  11/12/2024     Patient Identification  Brent Mina is a 79 y.o. male    Chief Complaint  Hematuria (Blood in urine. Pt arrived via EMS from [facility]. Here Sat for same, diagnosed w UTI. Nurses at facility noted new mass and excess hematuria and sent him for evaluation.)          HPI  (History provided by patient and EMS)  Patient is a 79-year-old male who arrives from a [facility] unit for ongoing gross hematuria.  He was seen 2 days ago for similar symptoms and had questionable UTI and a CT showing either a large clot or bladder wall mass 8 x 8 x 5 cm.  Was discharged home.  Sent back for ongoing hematuria and concern for the mass.  Patient is a poor historian.      Nursing Notes were all reviewed and agreed with, or any disagreements were addressed in the HPI:  Allergies:   Allergies   Allergen Reactions    Pcn [Penicillins] Other (See Comments)     Possible reaction of rash when he was 6    Sulfa Antibiotics Other (See Comments)     Poss rash when he was 6       Past medical history:  has a past medical history of ADHD (attention deficit hyperactivity disorder), Alzheimer disease (HCC), Cancer (HCC), Hyperlipidemia, Hypertension, IFG (impaired fasting glucose), OCD (obsessive compulsive disorder), Osteoarthritis, Renal insufficiency, and TIA (transient ischemic attack).    Past surgical history:  has a past surgical history that includes Colonoscopy (2004); Eye surgery; Prostatectomy; Intracapsular cataract extraction (Right, 10/21/2019); Intracapsular cataract extraction (Left, 10/31/2019); and Femur fracture surgery (Left, 4/20/2023).    Home medications:   Prior to Admission medications    Medication Sig Start Date End Date Taking? Authorizing Provider   ciprofloxacin (CIPRO) 500 MG tablet Take 1 tablet by mouth 2 times daily for 7 days 11/10/24 11/17/24  Cesia Ponce MD   amLODIPine (NORVASC) 10 MG tablet Take

## 2024-11-13 ENCOUNTER — ANESTHESIA (OUTPATIENT)
Dept: OPERATING ROOM | Age: 79
End: 2024-11-13
Payer: MEDICARE

## 2024-11-13 ENCOUNTER — ANESTHESIA EVENT (OUTPATIENT)
Dept: OPERATING ROOM | Age: 79
End: 2024-11-13
Payer: MEDICARE

## 2024-11-13 LAB
ANION GAP SERPL CALCULATED.3IONS-SCNC: 10 MMOL/L (ref 3–16)
BUN SERPL-MCNC: 30 MG/DL (ref 7–20)
CALCIUM SERPL-MCNC: 8.5 MG/DL (ref 8.3–10.6)
CHLORIDE SERPL-SCNC: 105 MMOL/L (ref 99–110)
CO2 SERPL-SCNC: 25 MMOL/L (ref 21–32)
CREAT SERPL-MCNC: 0.8 MG/DL (ref 0.8–1.3)
EKG ATRIAL RATE: 59 BPM
EKG DIAGNOSIS: NORMAL
EKG P AXIS: 68 DEGREES
EKG P-R INTERVAL: 152 MS
EKG Q-T INTERVAL: 486 MS
EKG QRS DURATION: 80 MS
EKG QTC CALCULATION (BAZETT): 481 MS
EKG R AXIS: 43 DEGREES
EKG T AXIS: 63 DEGREES
EKG VENTRICULAR RATE: 59 BPM
GFR SERPLBLD CREATININE-BSD FMLA CKD-EPI: 90 ML/MIN/{1.73_M2}
GLUCOSE BLD-MCNC: 113 MG/DL (ref 70–99)
GLUCOSE SERPL-MCNC: 98 MG/DL (ref 70–99)
HCT VFR BLD AUTO: 23.9 % (ref 40.5–52.5)
HCT VFR BLD AUTO: 24.6 % (ref 40.5–52.5)
HCT VFR BLD AUTO: 24.8 % (ref 40.5–52.5)
HGB BLD-MCNC: 8 G/DL (ref 13.5–17.5)
HGB BLD-MCNC: 8.3 G/DL (ref 13.5–17.5)
HGB BLD-MCNC: 8.4 G/DL (ref 13.5–17.5)
PERFORMED ON: ABNORMAL
POTASSIUM SERPL-SCNC: 4.1 MMOL/L (ref 3.5–5.1)
SODIUM SERPL-SCNC: 140 MMOL/L (ref 136–145)

## 2024-11-13 PROCEDURE — 85014 HEMATOCRIT: CPT

## 2024-11-13 PROCEDURE — 3600000014 HC SURGERY LEVEL 4 ADDTL 15MIN: Performed by: UROLOGY

## 2024-11-13 PROCEDURE — 2580000003 HC RX 258: Performed by: NURSE PRACTITIONER

## 2024-11-13 PROCEDURE — 2500000003 HC RX 250 WO HCPCS: Performed by: NURSE ANESTHETIST, CERTIFIED REGISTERED

## 2024-11-13 PROCEDURE — 3600000004 HC SURGERY LEVEL 4 BASE: Performed by: UROLOGY

## 2024-11-13 PROCEDURE — 7100000000 HC PACU RECOVERY - FIRST 15 MIN: Performed by: UROLOGY

## 2024-11-13 PROCEDURE — 6370000000 HC RX 637 (ALT 250 FOR IP): Performed by: NURSE PRACTITIONER

## 2024-11-13 PROCEDURE — 93005 ELECTROCARDIOGRAM TRACING: CPT | Performed by: NURSE PRACTITIONER

## 2024-11-13 PROCEDURE — 3700000000 HC ANESTHESIA ATTENDED CARE: Performed by: UROLOGY

## 2024-11-13 PROCEDURE — 80048 BASIC METABOLIC PNL TOTAL CA: CPT

## 2024-11-13 PROCEDURE — 2720000010 HC SURG SUPPLY STERILE: Performed by: UROLOGY

## 2024-11-13 PROCEDURE — 2709999900 HC NON-CHARGEABLE SUPPLY: Performed by: UROLOGY

## 2024-11-13 PROCEDURE — A4217 STERILE WATER/SALINE, 500 ML: HCPCS | Performed by: UROLOGY

## 2024-11-13 PROCEDURE — 6360000002 HC RX W HCPCS

## 2024-11-13 PROCEDURE — 6360000002 HC RX W HCPCS: Performed by: NURSE ANESTHETIST, CERTIFIED REGISTERED

## 2024-11-13 PROCEDURE — 3700000001 HC ADD 15 MINUTES (ANESTHESIA): Performed by: UROLOGY

## 2024-11-13 PROCEDURE — 0TCB8ZZ EXTIRPATION OF MATTER FROM BLADDER, VIA NATURAL OR ARTIFICIAL OPENING ENDOSCOPIC: ICD-10-PCS | Performed by: UROLOGY

## 2024-11-13 PROCEDURE — 85018 HEMOGLOBIN: CPT

## 2024-11-13 PROCEDURE — 93010 ELECTROCARDIOGRAM REPORT: CPT | Performed by: INTERNAL MEDICINE

## 2024-11-13 PROCEDURE — 94761 N-INVAS EAR/PLS OXIMETRY MLT: CPT

## 2024-11-13 PROCEDURE — 2700000000 HC OXYGEN THERAPY PER DAY

## 2024-11-13 PROCEDURE — 2580000003 HC RX 258: Performed by: UROLOGY

## 2024-11-13 PROCEDURE — 1200000000 HC SEMI PRIVATE

## 2024-11-13 PROCEDURE — 7100000001 HC PACU RECOVERY - ADDTL 15 MIN: Performed by: UROLOGY

## 2024-11-13 PROCEDURE — 36415 COLL VENOUS BLD VENIPUNCTURE: CPT

## 2024-11-13 RX ORDER — LABETALOL HYDROCHLORIDE 5 MG/ML
10 INJECTION, SOLUTION INTRAVENOUS
Status: DISCONTINUED | OUTPATIENT
Start: 2024-11-13 | End: 2024-11-13 | Stop reason: HOSPADM

## 2024-11-13 RX ORDER — SODIUM CHLORIDE 9 MG/ML
INJECTION, SOLUTION INTRAVENOUS PRN
Status: DISCONTINUED | OUTPATIENT
Start: 2024-11-13 | End: 2024-11-13 | Stop reason: HOSPADM

## 2024-11-13 RX ORDER — OXYCODONE HYDROCHLORIDE 5 MG/1
5 TABLET ORAL
Status: DISCONTINUED | OUTPATIENT
Start: 2024-11-13 | End: 2024-11-13 | Stop reason: HOSPADM

## 2024-11-13 RX ORDER — MEPERIDINE HYDROCHLORIDE 50 MG/ML
12.5 INJECTION INTRAMUSCULAR; INTRAVENOUS; SUBCUTANEOUS EVERY 5 MIN PRN
Status: DISCONTINUED | OUTPATIENT
Start: 2024-11-13 | End: 2024-11-13 | Stop reason: HOSPADM

## 2024-11-13 RX ORDER — CEFAZOLIN SODIUM 1 G/3ML
INJECTION, POWDER, FOR SOLUTION INTRAMUSCULAR; INTRAVENOUS
Status: DISCONTINUED | OUTPATIENT
Start: 2024-11-13 | End: 2024-11-13 | Stop reason: SDUPTHER

## 2024-11-13 RX ORDER — MAGNESIUM HYDROXIDE 1200 MG/15ML
LIQUID ORAL CONTINUOUS PRN
Status: COMPLETED | OUTPATIENT
Start: 2024-11-13 | End: 2024-11-13

## 2024-11-13 RX ORDER — LIDOCAINE HYDROCHLORIDE 20 MG/ML
INJECTION, SOLUTION INFILTRATION; PERINEURAL
Status: DISCONTINUED | OUTPATIENT
Start: 2024-11-13 | End: 2024-11-13 | Stop reason: SDUPTHER

## 2024-11-13 RX ORDER — SODIUM CHLORIDE 0.9 % (FLUSH) 0.9 %
5-40 SYRINGE (ML) INJECTION EVERY 12 HOURS SCHEDULED
Status: DISCONTINUED | OUTPATIENT
Start: 2024-11-13 | End: 2024-11-13 | Stop reason: HOSPADM

## 2024-11-13 RX ORDER — LORAZEPAM 2 MG/ML
0.5 INJECTION INTRAMUSCULAR
Status: DISCONTINUED | OUTPATIENT
Start: 2024-11-13 | End: 2024-11-13 | Stop reason: HOSPADM

## 2024-11-13 RX ORDER — ONDANSETRON 2 MG/ML
4 INJECTION INTRAMUSCULAR; INTRAVENOUS
Status: DISCONTINUED | OUTPATIENT
Start: 2024-11-13 | End: 2024-11-13 | Stop reason: HOSPADM

## 2024-11-13 RX ORDER — ONDANSETRON 2 MG/ML
INJECTION INTRAMUSCULAR; INTRAVENOUS
Status: DISCONTINUED | OUTPATIENT
Start: 2024-11-13 | End: 2024-11-13 | Stop reason: SDUPTHER

## 2024-11-13 RX ORDER — PHENYLEPHRINE HCL IN 0.9% NACL 1 MG/10 ML
SYRINGE (ML) INTRAVENOUS
Status: DISCONTINUED | OUTPATIENT
Start: 2024-11-13 | End: 2024-11-13 | Stop reason: SDUPTHER

## 2024-11-13 RX ORDER — SODIUM CHLORIDE 0.9 % (FLUSH) 0.9 %
5-40 SYRINGE (ML) INJECTION PRN
Status: DISCONTINUED | OUTPATIENT
Start: 2024-11-13 | End: 2024-11-13 | Stop reason: HOSPADM

## 2024-11-13 RX ORDER — FENTANYL CITRATE 50 UG/ML
INJECTION, SOLUTION INTRAMUSCULAR; INTRAVENOUS
Status: DISCONTINUED | OUTPATIENT
Start: 2024-11-13 | End: 2024-11-13 | Stop reason: SDUPTHER

## 2024-11-13 RX ORDER — DEXAMETHASONE SODIUM PHOSPHATE 4 MG/ML
INJECTION, SOLUTION INTRA-ARTICULAR; INTRALESIONAL; INTRAMUSCULAR; INTRAVENOUS; SOFT TISSUE
Status: DISCONTINUED | OUTPATIENT
Start: 2024-11-13 | End: 2024-11-13 | Stop reason: SDUPTHER

## 2024-11-13 RX ORDER — NALOXONE HYDROCHLORIDE 0.4 MG/ML
INJECTION, SOLUTION INTRAMUSCULAR; INTRAVENOUS; SUBCUTANEOUS PRN
Status: DISCONTINUED | OUTPATIENT
Start: 2024-11-13 | End: 2024-11-13 | Stop reason: HOSPADM

## 2024-11-13 RX ORDER — PROPOFOL 10 MG/ML
INJECTION, EMULSION INTRAVENOUS
Status: DISCONTINUED | OUTPATIENT
Start: 2024-11-13 | End: 2024-11-13 | Stop reason: SDUPTHER

## 2024-11-13 RX ORDER — PROCHLORPERAZINE EDISYLATE 5 MG/ML
5 INJECTION INTRAMUSCULAR; INTRAVENOUS
Status: DISCONTINUED | OUTPATIENT
Start: 2024-11-13 | End: 2024-11-13 | Stop reason: HOSPADM

## 2024-11-13 RX ORDER — DIPHENHYDRAMINE HYDROCHLORIDE 50 MG/ML
12.5 INJECTION INTRAMUSCULAR; INTRAVENOUS
Status: DISCONTINUED | OUTPATIENT
Start: 2024-11-13 | End: 2024-11-13 | Stop reason: HOSPADM

## 2024-11-13 RX ADMIN — ATORVASTATIN CALCIUM 40 MG: 40 TABLET, FILM COATED ORAL at 20:04

## 2024-11-13 RX ADMIN — LIDOCAINE HYDROCHLORIDE 60 MG: 20 INJECTION, SOLUTION INFILTRATION; PERINEURAL at 14:09

## 2024-11-13 RX ADMIN — AMLODIPINE BESYLATE 10 MG: 5 TABLET ORAL at 09:42

## 2024-11-13 RX ADMIN — CIPROFLOXACIN HYDROCHLORIDE 500 MG: 500 TABLET, FILM COATED ORAL at 09:42

## 2024-11-13 RX ADMIN — DEXAMETHASONE SODIUM PHOSPHATE 4 MG: 4 INJECTION, SOLUTION INTRAMUSCULAR; INTRAVENOUS at 14:18

## 2024-11-13 RX ADMIN — PROPOFOL 100 MG: 10 INJECTION, EMULSION INTRAVENOUS at 14:09

## 2024-11-13 RX ADMIN — FENTANYL CITRATE 25 MCG: 50 INJECTION, SOLUTION INTRAMUSCULAR; INTRAVENOUS at 14:18

## 2024-11-13 RX ADMIN — CEFAZOLIN 2 G: 1 INJECTION, POWDER, FOR SOLUTION INTRAMUSCULAR; INTRAVENOUS at 14:13

## 2024-11-13 RX ADMIN — SODIUM CHLORIDE, PRESERVATIVE FREE 10 ML: 5 INJECTION INTRAVENOUS at 11:39

## 2024-11-13 RX ADMIN — ONDANSETRON 4 MG: 2 INJECTION INTRAMUSCULAR; INTRAVENOUS at 14:31

## 2024-11-13 RX ADMIN — FLUOXETINE HYDROCHLORIDE 60 MG: 20 CAPSULE ORAL at 09:42

## 2024-11-13 RX ADMIN — SODIUM CHLORIDE, PRESERVATIVE FREE 10 ML: 5 INJECTION INTRAVENOUS at 20:04

## 2024-11-13 RX ADMIN — RIVASTIGMINE TARTRATE 6 MG: 1.5 CAPSULE ORAL at 11:39

## 2024-11-13 RX ADMIN — CIPROFLOXACIN HYDROCHLORIDE 500 MG: 500 TABLET, FILM COATED ORAL at 20:04

## 2024-11-13 RX ADMIN — RIVASTIGMINE TARTRATE 6 MG: 1.5 CAPSULE ORAL at 20:03

## 2024-11-13 RX ADMIN — Medication 100 MCG: at 14:25

## 2024-11-13 RX ADMIN — SODIUM CHLORIDE: 9 INJECTION, SOLUTION INTRAVENOUS at 13:55

## 2024-11-13 RX ADMIN — Medication 1 TABLET: at 09:42

## 2024-11-13 ASSESSMENT — PAIN - FUNCTIONAL ASSESSMENT: PAIN_FUNCTIONAL_ASSESSMENT: 0-10

## 2024-11-13 NOTE — ED NOTES
Brent Mina is a 79 y.o. male admitted for  Principal Problem:    Acute blood loss anemia (ABLA)  Resolved Problems:    * No resolved hospital problems. *  .   Patient Home via EMS transportation with   Chief Complaint   Patient presents with    Hematuria     Blood in urine. Pt arrived via EMS from [facility]. Here Sat for same, diagnosed w UTI. Nurses at facility noted new mass and excess hematuria and sent him for evaluation.   .  Patient is alert and Person  Patient's baseline mobility: Baseline Mobility: Bed bound   Code Status: Prior   Cardiac Rhythm:       Is patient on baseline Oxygen: no:   Abnormal Assessment Findings: hematuria    NIH Score:    C-SSRS: Risk of Suicide: No Risk  Bedside swallow:        Active LDA's:   Peripheral IV 11/12/24 Left Antecubital (Active)         Family/Caregiver Present no Any Concerns: no   Restraints no  Sitter no         Vitals:      Vitals:    11/12/24 1703 11/12/24 1709 11/12/24 1734 11/12/24 1933   BP: 117/67  123/62 (!) 157/73   Pulse: 71  71 74   Resp: (!) 35  23 27   Temp:  98.1 °F (36.7 °C)     SpO2: 100%  100% 100%       Last documented pain score (0-10 scale)    Pain medication administered Yes- see MAR.    Pertinent or High Risk Medications/Drips: No.    Pending Blood Product Administration: maybe    Abnormal labs:   Abnormal Labs Reviewed   URINALYSIS WITH REFLEX TO CULTURE - Abnormal; Notable for the following components:       Result Value    Color, UA RED (*)     Clarity, UA TURBID (*)     Glucose, Ur Color Interfer (*)     Bilirubin, Urine Color Interfer (*)     Ketones, Urine Color Interfer (*)     Blood, Urine Color Interfer (*)     pH, Urine Color Interfer (*)     Protein, UA Color Interfer (*)     Urobilinogen, Urine Color Interfer (*)     Nitrite, Urine Color Interfer (*)     Leukocyte Esterase, Urine Color Interfer (*)     All other components within normal limits   CBC WITH AUTO DIFFERENTIAL - Abnormal; Notable for the following components:

## 2024-11-13 NOTE — ANESTHESIA POSTPROCEDURE EVALUATION
Department of Anesthesiology  Postprocedure Note    Patient: Brent Mina  MRN: [id number]  YOB: 1945  Date of evaluation: 11/13/2024    Procedure Summary       Date: 11/13/24 Room / Location: [address]    Anesthesia Start: 1403 Anesthesia Stop: 1440    Procedure: CYSTOSCOPY EVACUATION OF CLOTS (Bladder) Diagnosis:       Gross hematuria      (Gross hematuria [R31.0])    Surgeons: Paz Huang MD Responsible Provider: Lonnie Robbins MD    Anesthesia Type: general ASA Status: 3            Anesthesia Type: No value filed.    Santos Phase I: Santos Score: 10    Santos Phase II:      Anesthesia Post Evaluation    Patient location during evaluation: PACU  Patient participation: complete - patient participated  Level of consciousness: awake and alert  Airway patency: patent  Nausea & Vomiting: no vomiting and no nausea  Cardiovascular status: hemodynamically stable and blood pressure returned to baseline  Respiratory status: acceptable  Hydration status: euvolemic  Comments: --------------------            11/13/24               1500     --------------------   BP:       116/61     Pulse:      66       Resp:       20       Temp:                SpO2:      100%     --------------------    Pain management: adequate    No notable events documented.

## 2024-11-13 NOTE — CONSULTS
Consult Call Back    Who:Susan Beckett, APRN - CNP   Date:11/13/2024,  Time:11:23 AM    Electronically signed by Tricia Murillo on 11/13/24 at 11:23 AM EST

## 2024-11-13 NOTE — OP NOTE
Operative Note      Patient: Brent Mina  YOB: 1945  MRN: [id number]    Date of Procedure: 11/13/2024    Pre-Op Diagnosis Codes:      * Gross hematuria [R31.0]    Post-Op Diagnosis: Same, bladder neck contracture       Procedure(s):  Cystoscopy  Clot evacuation and fulguration  Transurethral incision of bladder neck contracture    Surgeon(s):  Paz Huang MD    Assistant:   Surgical Assistant: Wiley Mccurdy    Anesthesia: General    Estimated Blood Loss (mL): Minimal    Complications: None    Specimens:   * No specimens in log *    Implants:  * No implants in log *      Drains:   Urinary Catheter 11/13/24 3 Way (Active)       [REMOVED] Urinary Catheter 11/12/24 3 Way (Removed)   Urine Color Pink 11/13/24 0947   Urine Appearance Red flecks 11/13/24 0947   Catheter Care  Perineal wipes 11/13/24 0238   CBI Irrigation Intake (mL) 3000 mL 11/13/24 0947   CBI Sagastume Output (mL) 2975 mL 11/13/24 0947   CBI Net Output (mL) -25 mL 11/13/24 0947       Findings:  Infection Present At Time Of Surgery (PATOS) (choose all levels that have infection present):  No infection present  Other Findings: Approximately 16 Hong [place]er bladder neck contracture which was bleeding-this was incised-clots evacuated from bladder-no bladder tumors appreciated    Detailed Description of Procedure:   The patient was brought to the operating room antibiotics were given.  Existing Sagastume was removed.  The patient was placed in dorsal lithotomy position prepped draped usual fashion.  I inserted a 21 [place]er rigid cystoscope per urethra.  There was an approximately 16 [place] Konger bladder neck contracture appreciated.  There was bleeding circumferentially from this.  I exchanged out for a 25 [place]er resectoscope.  I incised the bladder neck contracture until I was able to advance the 25 [place] Konger resectoscope through. I then did a used a Hannah syringe and evacuated clot for about 10 minutes.  There was copious clot irrigated out.  I then

## 2024-11-13 NOTE — ANESTHESIA PRE PROCEDURE
Department of Anesthesiology  Preprocedure Note       Name:  Brent Mina   Age:  79 y.o.  :  1945                                          MRN:  [id number]         Date:  2024      Surgeon: Surgeon(s):  Paz Huang MD    Procedure: Procedure(s):  CYSTOSCOPY EVACUATION OF CLOTS    Medications prior to admission:   Prior to Admission medications    Medication Sig Start Date End Date Taking? Authorizing Provider   ciprofloxacin (CIPRO) 500 MG tablet Take 1 tablet by mouth 2 times daily for 7 days 11/10/24 11/17/24  Cesia Ponce MD   amLODIPine (NORVASC) 10 MG tablet Take 1 tablet by mouth daily 24   Shanda Nunez APRN - CNP   acetaminophen (TYLENOL) 325 MG tablet Take 2 tablets by mouth in the morning and 2 tablets at noon and 2 tablets in the evening and 2 tablets before bedtime. 23   Nemo Arreola PA   rivastigmine (EXELON) 6 MG capsule Take 1 capsule by mouth 2 times daily 19   Amy Arango MD   atorvastatin (LIPITOR) 40 MG tablet daily  19   Amy Arango MD   FLUoxetine (PROZAC) 20 MG capsule Take 3 capsules by mouth daily    Amy Arango MD   aspirin 81 MG tablet Take 1 tablet by mouth daily    Amy Arango MD   multivitamin (THERAGRAN) per tablet Take 1 tablet by mouth daily    Amy Arango MD   Ascorbic Acid (VITAMIN C) 250 MG tablet Take 2 tablets by mouth daily    Amy Arango MD   Omega-3 Fatty Acids (FISH OIL) 1000 MG CAPS Take 2 capsules by mouth 2 times daily    Amy Arango MD   Misc Natural Products (OSTEO BI-FLEX JOINT SHIELD PO) Take  by mouth.      Amy Arango MD       Current medications:    Current Facility-Administered Medications   Medication Dose Route Frequency Provider Last Rate Last Admin   • lidocaine (XYLOCAINE) 2 % uro-jet   Topical PRN Jason Mckenzie MD       • amLODIPine (NORVASC) tablet 10 mg  10 mg Oral Daily Braden Hayes APRN - CNP   10 mg at

## 2024-11-13 NOTE — CARE COORDINATION
Patient unable to complete assessment vm left with Latanya [phone] as [phone] not working number.BARNEY Ley

## 2024-11-13 NOTE — CONSULTS
Consult Placed     Who:montana   Date:11/12/24  Time:2112   Perfect served  Electronically signed by Nava Nash on 11/12/2024 at 9:17 PM

## 2024-11-13 NOTE — CARE COORDINATION
Case Management Assessment  Initial Evaluation    Date/Time of Evaluation: 11/13/2024 11:52 AM  Assessment Completed by: BARNEY Ley    If patient is discharged prior to next notation, then this note serves as note for discharge by case management.    Patient Name: Brent Mina                   YOB: 1945  Diagnosis: Gross hematuria [R31.0]  Acute blood loss anemia (ABLA) [D62]                   Date / Time: 11/12/2024  4:56 PM    Patient Admission Status: Inpatient   Readmission Risk (Low < 19, Mod (19-27), High > 27): Readmission Risk Score: 19.6    Current PCP: Aravind Reed MD  PCP verified by CM? Yes    Chart Reviewed: Yes      History Provided by: Spouse  Patient Orientation: Person    Patient Cognition: Dementia / Early Alzheimer's    Hospitalization in the last 30 days (Readmission):  No      Advance Directives:      Code Status: DNR-CCA   Patient's Primary Decision Maker is: Named in Scanned ACP Document    Primary Decision Maker: Theodore Minalene INEZ - Spouse - [phone]    Primary Decision Maker: KeeleyDrew Tammy Child - [phone]    Discharge Planning:    Patient lives with: Other (Comment) (memory care) Type of Home: Other (Comment) ([place] memory care)  Primary Care Giver: Other (Comment) (memory care)  Patient Support Systems include: Children, Spouse/Significant Other, Home Care Staff   Current Financial resources: Medicare  Current community resources: None  Current services prior to admission: Home Care, Durable Medical Equipment            Current DME: Wheelchair    ADLS  Prior functional level: Assistance with the following:, Bathing, Dressing, Toileting, Cooking, Housework, Shopping, Mobility  Current functional level: Assistance with the following:, Bathing, Dressing, Toileting, Cooking, Housework, Shopping, Mobility    Family can provide assistance at DC: No  Would you like Case Management to discuss the discharge plan with any other family members/significant

## 2024-11-13 NOTE — PLAN OF CARE
Problem: Safety - Adult  Goal: Free from fall injury  11/13/2024 1223 by Dee Dee Hall, RN  Outcome: Progressing     Problem: Skin/Tissue Integrity  Goal: Absence of new skin breakdown  Description: 1.  Monitor for areas of redness and/or skin breakdown  2.  Assess vascular access sites hourly  3.  Every 4-6 hours minimum:  Change oxygen saturation probe site  4.  Every 4-6 hours:  If on nasal continuous positive airway pressure, respiratory therapy assess nares and determine need for appliance change or resting period.  11/13/2024 1223 by Dee Dee Hall, RN  Outcome: Progressing

## 2024-11-13 NOTE — CONSULTS
Reason for Consult: gross hematuria since 11/9, bladder mass vs clot on CT 11/9 returned to ED for ongoing hematuria and now anemia    History of Present Illness: Brent Mina is a 79 y.o. male with history of Alzheimer's, HTN, HLD, mary 9 prostate cancer s/p prostatectomy in 2015 with subsequent salvage radiation. He was last seen in office 7/2020. He came to the hospital on 11/9/24 for gross hematuria and had a CT performed at that time with a 8.5cm clot within the bladder. He had evidence of a UTI so was discharged on Cipro and has returned for continued gross hematuria. HGB has also dropped from 11.1 on 11/9/24 to now 8.0 today. Patient is comfortable at this time.       ROS:  General: no fever or chills  CV: No chest pain  Pulm: No SOB  GI: No nausea, vomiting, diarrhea or constipation  Skin: No rash  : as above      Past Medical History:   Past Medical History:   Diagnosis Date    ADHD (attention deficit hyperactivity disorder)     Alzheimer disease (HCC)     Cancer (HCC)     prostate    Hyperlipidemia     Hypertension     IFG (impaired fasting glucose)     OCD (obsessive compulsive disorder)     Osteoarthritis     Renal insufficiency     TIA (transient ischemic attack)        Past Surgical History:  Past Surgical History:   Procedure Laterality Date    COLONOSCOPY  2004    EYE SURGERY      FEMUR FRACTURE SURGERY Left 4/20/2023    LEFT HIP GAMMA NAILING WITH CABLES                  GUERITA performed by Niko Higgins MD at [facility] OR    INTRACAPSULAR CATARACT EXTRACTION Right 10/21/2019    PHACOEMULSIFICATION OF CATARACT RIGHT EYE WITH INTRAOCULAR LENS IMPLANT performed by Jose Che MD at [facility] OR    INTRACAPSULAR CATARACT EXTRACTION Left 10/31/2019    PHACOEMULSIFICATION OF CATARACT LEFT EYE WITH INTRAOCULAR LENS IMPLANT performed by Jose Che MD at [facility] OR    PROSTATECTOMY         Social History:  Social History     Socioeconomic History    Marital status:      Spouse

## 2024-11-14 LAB
BACTERIA UR CULT: ABNORMAL
BACTERIA UR CULT: ABNORMAL
ORGANISM: ABNORMAL

## 2024-11-14 PROCEDURE — 6370000000 HC RX 637 (ALT 250 FOR IP): Performed by: NURSE PRACTITIONER

## 2024-11-14 PROCEDURE — 1200000000 HC SEMI PRIVATE

## 2024-11-14 RX ADMIN — CIPROFLOXACIN HYDROCHLORIDE 500 MG: 500 TABLET, FILM COATED ORAL at 23:56

## 2024-11-14 RX ADMIN — ACETAMINOPHEN 650 MG: 325 TABLET ORAL at 18:48

## 2024-11-14 RX ADMIN — AMLODIPINE BESYLATE 10 MG: 5 TABLET ORAL at 09:33

## 2024-11-14 RX ADMIN — ATORVASTATIN CALCIUM 40 MG: 40 TABLET, FILM COATED ORAL at 23:56

## 2024-11-14 RX ADMIN — CIPROFLOXACIN HYDROCHLORIDE 500 MG: 500 TABLET, FILM COATED ORAL at 09:32

## 2024-11-14 RX ADMIN — RIVASTIGMINE TARTRATE 6 MG: 1.5 CAPSULE ORAL at 09:48

## 2024-11-14 RX ADMIN — Medication 1 TABLET: at 09:33

## 2024-11-14 RX ADMIN — FLUOXETINE HYDROCHLORIDE 60 MG: 20 CAPSULE ORAL at 09:32

## 2024-11-14 RX ADMIN — RIVASTIGMINE TARTRATE 6 MG: 1.5 CAPSULE ORAL at 23:57

## 2024-11-14 ASSESSMENT — PAIN SCALES - GENERAL: PAINLEVEL_OUTOF10: 3

## 2024-11-14 NOTE — CARE COORDINATION
Hospital day 2: Patient on C3 re acute blood loss anemia care managed by IM and Urology. Patient from [facility]. Patient CBI stopped, plans for void trial in am. Writer spoke with [facility] this date they report Patient was active with power back therapy will need resumption of care orders. SW following.BARNEY Ley

## 2024-11-14 NOTE — DISCHARGE INSTR - COC
Continuity of Care Form    Patient Name: Brent Mnia   :  1945  MRN:  [id number]    Admit date:  2024  Discharge date:  2024    Code Status Order: Limited   Advance Directives:   Advance Care Flowsheet Documentation        Date/Time Healthcare Directive Type of Healthcare Directive Copy in Chart Healthcare Agent Appointed Healthcare Agent's Name Healthcare Agent's Phone Number    24 [phone] Yes, patient has an advance directive for healthcare treatment  Health care treatment directive  Yes, copy in chart  Spouse  Latanya Mina  --                     Admitting Physician:  Keyla Keller MD  PCP: Aravind Reed MD    Discharging Nurse: Edilson Lorenzo RN  Discharging Hospital Unit/Room#: 0329/0329-01  Discharging Unit Phone Number: [phone]    Emergency Contact:   Extended Emergency Contact Information  Primary Emergency Contact: Latanya Mina  Address: [address] [place] [address]  Home Phone: [phone]  Mobile Phone: [phone]  Relation: Spouse  Secondary Emergency Contact: ThaiDrew bolaños   [facility]  Home Phone: [phone]  Mobile Phone: [phone]  Relation: Child    Past Surgical History:  Past Surgical History:   Procedure Laterality Date    COLONOSCOPY      CYSTOSCOPY N/A 2024    CYSTOSCOPY EVACUATION OF CLOTS performed by Paz Huang MD at [facility] OR    EYE SURGERY      FEMUR FRACTURE SURGERY Left 2023    LEFT HIP GAMMA NAILING WITH CABLES                  GUERITA performed by Niko Higgins MD at [facility] OR    INTRACAPSULAR CATARACT EXTRACTION Right 10/21/2019    PHACOEMULSIFICATION OF CATARACT RIGHT EYE WITH INTRAOCULAR LENS IMPLANT performed by Jose Che MD at [facility] OR    INTRACAPSULAR CATARACT EXTRACTION Left 10/31/2019    PHACOEMULSIFICATION OF CATARACT LEFT EYE WITH INTRAOCULAR LENS IMPLANT performed by Jose Che MD at [facility] OR    PROSTATECTOMY         Immunization

## 2024-11-14 NOTE — CONSULTS
Advance Care Planning     Advance Care Planning (ACP) Note    Date of ACP Conversation: 11/14/2024  Persons included in Conversation: POA  Length of ACP Conversation in minutes: 20 minutes    Conversation requested by:  CODE STATUS CLARIFICATION    Authorized Decision Maker (if patient is incapable of making informed decisions):   This person is:  Latanya Mina  Named in Advance Directive or Healthcare Power of       Primary Decision Maker: Latanya Mina M - Spouse - [phone]    Secondary Decision Maker: Drew Mina - Child - [phone]    General ACP for ALL Patients with Decision Making Capacity:    Review of Existing Advance Directive: (Select questions covered)  Reviewed the AD's (HCPOA & Living Will) in this EMR and pt designated his spouse as his healthcare proxy.  He named son, Truman, as the alternate.     Interventions Provided:  Spoke w/ Mrs. Mina via phone.  She affirmed understanding of pts wish for DNR.  She was very agreeable to furthering our discussion to help team understand pt wishes re: short term intubation, if that became necessary.  We discussed examples as to why someone might need intubation and the fact that the goal would be short term to support the pt over the medical issue @ hand.  Writer also discussed the alternative for comfort support if more aggressive, non-invasive efforts weren't improving the situation.  Mrs. Mina said that her  would want effort made w/ short term intubation if that became necessary.    Will further modify code status to a 'limited' w/ 'yes' to intubation and 'no' to the other options.

## 2024-11-15 LAB
ANION GAP SERPL CALCULATED.3IONS-SCNC: 11 MMOL/L (ref 3–16)
BUN SERPL-MCNC: 22 MG/DL (ref 7–20)
CALCIUM SERPL-MCNC: 8.1 MG/DL (ref 8.3–10.6)
CHLORIDE SERPL-SCNC: 101 MMOL/L (ref 99–110)
CO2 SERPL-SCNC: 22 MMOL/L (ref 21–32)
CREAT SERPL-MCNC: 0.9 MG/DL (ref 0.8–1.3)
GFR SERPLBLD CREATININE-BSD FMLA CKD-EPI: 86 ML/MIN/{1.73_M2}
GLUCOSE SERPL-MCNC: 83 MG/DL (ref 70–99)
POTASSIUM SERPL-SCNC: 3.9 MMOL/L (ref 3.5–5.1)
SODIUM SERPL-SCNC: 134 MMOL/L (ref 136–145)

## 2024-11-15 PROCEDURE — 80048 BASIC METABOLIC PNL TOTAL CA: CPT

## 2024-11-15 PROCEDURE — 51798 US URINE CAPACITY MEASURE: CPT

## 2024-11-15 PROCEDURE — 6370000000 HC RX 637 (ALT 250 FOR IP): Performed by: NURSE PRACTITIONER

## 2024-11-15 PROCEDURE — 36415 COLL VENOUS BLD VENIPUNCTURE: CPT

## 2024-11-15 PROCEDURE — 1200000000 HC SEMI PRIVATE

## 2024-11-15 PROCEDURE — 97530 THERAPEUTIC ACTIVITIES: CPT

## 2024-11-15 PROCEDURE — 97162 PT EVAL MOD COMPLEX 30 MIN: CPT

## 2024-11-15 PROCEDURE — 97166 OT EVAL MOD COMPLEX 45 MIN: CPT

## 2024-11-15 RX ORDER — LORAZEPAM 0.5 MG/1
0.5 TABLET ORAL ONCE
Status: COMPLETED | OUTPATIENT
Start: 2024-11-16 | End: 2024-11-15

## 2024-11-15 RX ADMIN — Medication 1 TABLET: at 09:07

## 2024-11-15 RX ADMIN — CIPROFLOXACIN HYDROCHLORIDE 500 MG: 500 TABLET, FILM COATED ORAL at 20:25

## 2024-11-15 RX ADMIN — RIVASTIGMINE TARTRATE 6 MG: 1.5 CAPSULE ORAL at 10:39

## 2024-11-15 RX ADMIN — ATORVASTATIN CALCIUM 40 MG: 40 TABLET, FILM COATED ORAL at 20:25

## 2024-11-15 RX ADMIN — LORAZEPAM 0.5 MG: 0.5 TABLET ORAL at 23:48

## 2024-11-15 RX ADMIN — FLUOXETINE HYDROCHLORIDE 60 MG: 20 CAPSULE ORAL at 09:07

## 2024-11-15 RX ADMIN — CIPROFLOXACIN HYDROCHLORIDE 500 MG: 500 TABLET, FILM COATED ORAL at 09:07

## 2024-11-15 RX ADMIN — AMLODIPINE BESYLATE 10 MG: 5 TABLET ORAL at 09:07

## 2024-11-15 RX ADMIN — RIVASTIGMINE TARTRATE 6 MG: 1.5 CAPSULE ORAL at 20:54

## 2024-11-15 ASSESSMENT — PAIN SCALES - GENERAL: PAINLEVEL_OUTOF10: 0

## 2024-11-15 NOTE — CARE COORDINATION
Hospital day 3: Patient on C3 care managed by IM and Urology. Patient from [facility], with power back therapy . Plans for void trail this date. SW following.BARNEY Ley  7433: Spoke wit RN trouble voiding. If would need cho at dc will need skilled nursing referral, [facility] following for possible need.Patient will also need leg BARNEY Luna

## 2024-11-15 NOTE — PLAN OF CARE
Problem: Safety - Adult  Goal: Free from fall injury  Outcome: Progressing     Problem: Skin/Tissue Integrity  Goal: Absence of new skin breakdown  Description: 1.  Monitor for areas of redness and/or skin breakdown  2.  Assess vascular access sites hourly  3.  Every 4-6 hours minimum:  Change oxygen saturation probe site  4.  Every 4-6 hours:  If on nasal continuous positive airway pressure, respiratory therapy assess nares and determine need for appliance change or resting period.  Outcome: Progressing     Problem: Discharge Planning  Goal: Discharge to home or other facility with appropriate resources  Outcome: Progressing     Problem: Pain  Goal: Verbalizes/displays adequate comfort level or baseline comfort level  Outcome: Progressing

## 2024-11-16 LAB
DEPRECATED RDW RBC AUTO: 13.5 % (ref 12.4–15.4)
HCT VFR BLD AUTO: 24.2 % (ref 40.5–52.5)
HGB BLD-MCNC: 8.4 G/DL (ref 13.5–17.5)
MCH RBC QN AUTO: 32.4 PG (ref 26–34)
MCHC RBC AUTO-ENTMCNC: 34.8 G/DL (ref 31–36)
MCV RBC AUTO: 93.4 FL (ref 80–100)
PLATELET # BLD AUTO: 351 K/UL (ref 135–450)
PMV BLD AUTO: 7.7 FL (ref 5–10.5)
RBC # BLD AUTO: 2.59 M/UL (ref 4.2–5.9)
WBC # BLD AUTO: 5.8 K/UL (ref 4–11)

## 2024-11-16 PROCEDURE — 6370000000 HC RX 637 (ALT 250 FOR IP): Performed by: UROLOGY

## 2024-11-16 PROCEDURE — 51702 INSERT TEMP BLADDER CATH: CPT

## 2024-11-16 PROCEDURE — 6370000000 HC RX 637 (ALT 250 FOR IP)

## 2024-11-16 PROCEDURE — 1200000000 HC SEMI PRIVATE

## 2024-11-16 PROCEDURE — 85027 COMPLETE CBC AUTOMATED: CPT

## 2024-11-16 PROCEDURE — 36415 COLL VENOUS BLD VENIPUNCTURE: CPT

## 2024-11-16 RX ORDER — FLUOXETINE 20 MG/5ML
60 SOLUTION ORAL DAILY
Status: DISCONTINUED | OUTPATIENT
Start: 2024-11-16 | End: 2024-11-19 | Stop reason: HOSPADM

## 2024-11-16 RX ADMIN — RIVASTIGMINE TARTRATE 6 MG: 1.5 CAPSULE ORAL at 11:16

## 2024-11-16 RX ADMIN — Medication 1 TABLET: at 11:16

## 2024-11-16 RX ADMIN — RIVASTIGMINE TARTRATE 6 MG: 1.5 CAPSULE ORAL at 20:58

## 2024-11-16 RX ADMIN — CIPROFLOXACIN HYDROCHLORIDE 500 MG: 500 TABLET, FILM COATED ORAL at 11:16

## 2024-11-16 RX ADMIN — FLUOXETINE HYDROCHLORIDE 60 MG: 20 SOLUTION ORAL at 11:28

## 2024-11-16 RX ADMIN — ATORVASTATIN CALCIUM 40 MG: 40 TABLET, FILM COATED ORAL at 20:58

## 2024-11-16 RX ADMIN — CIPROFLOXACIN HYDROCHLORIDE 500 MG: 500 TABLET, FILM COATED ORAL at 20:58

## 2024-11-16 RX ADMIN — AMLODIPINE BESYLATE 10 MG: 5 TABLET ORAL at 11:16

## 2024-11-16 NOTE — FLOWSHEET NOTE
Np advised to wait an hour and see how pt responds. States to give the pt a break. Charge RN notified.

## 2024-11-16 NOTE — FLOWSHEET NOTE
Pt has been sleeping comfortably since Sagastume placed at 0130. 750 ml emptied at this time. Clear, yellow urine. No s/s of distress or discomfort. All fall precautions in place. Call light in reach.

## 2024-11-16 NOTE — FLOWSHEET NOTE
Pt is very anxious and worried about having a catheter placed. Pt will not allow catheter to be placed and has agreed that if he has meds that will calm his nerves he will allow catheter to be placed. Will notify MD.

## 2024-11-16 NOTE — FLOWSHEET NOTE
Pt has not voided since having cho removed on day shift. External catheter placed, but still no urine output. Bladder scanned for 720. Will place cho.

## 2024-11-16 NOTE — FLOWSHEET NOTE
PCA was giving pt CHG bath and pt became combative and resistant to any care. Male RN brought in to assist and bath was completed along with having Cho catheter placed. Immediate urine return with 600 ML out and more draining. Pt tolerated well. Pt is now sleeping comfortably and not bothering cho. All fall precautions in place. Bed alarm on. Tele-camera monitoring at all times. Call light in reach.

## 2024-11-16 NOTE — FLOWSHEET NOTE
Pt was pre-medicated with Ativan to help him with his nerves before placing Cho. Pt is very anxious, wringing hands, trying to take ID bracelet off. Tried lowering pt's head and pt started swinging at this nurse. Advised pt that hitting was unacceptable. Pt adamantly stated \"No\" to having cho placed. Np messaged requesting restraints to place catheter. Charge RN notified.

## 2024-11-16 NOTE — PLAN OF CARE
Problem: Safety - Adult  Goal: Free from fall injury  11/15/2024 2156 by Renee Benitez RN  Outcome: Progressing  11/15/2024 1639 by Dee Dee Hall RN  Outcome: Progressing   Pt free from falls this shift. Fall precautions in place at all times. Call light always within reach. Pt able and agreeable to contact for safety appropriately.    Problem: Skin/Tissue Integrity  Goal: Absence of new skin breakdown  Description: 1.  Monitor for areas of redness and/or skin breakdown  2.  Assess vascular access sites hourly  3.  Every 4-6 hours minimum:  Change oxygen saturation probe site  4.  Every 4-6 hours:  If on nasal continuous positive airway pressure, respiratory therapy assess nares and determine need for appliance change or resting period.  11/15/2024 2156 by Renee Benitez RN  Outcome: Progressing  11/15/2024 1639 by Dee Dee Hall RN  Outcome: Progressing   Skin assessment performed each shift per protocol.  Patient turned and repositioned every two hours and prn with pillow support. Patient checked for incontence every two hours.     Problem: Discharge Planning  Goal: Discharge to home or other facility with appropriate resources  11/15/2024 2156 by Renee Benitez RN  Outcome: Progressing  11/15/2024 1639 by Dee Dee Hall RN  Outcome: Progressing   Continuing to work with patient and health care team on discharge plan. Discharge instructions and medication management will be reviewed prior to discharge.   Problem: Pain  Goal: Verbalizes/displays adequate comfort level or baseline comfort level  11/15/2024 2156 by Renee Benitez RN  Outcome: Progressing  11/15/2024 1639 by Dee Dee Hall RN  Outcome: Progressing   Pt able to express presence/absence of pain and rate pain appropriately using numerical scale. Pain/discomfort being managed with PRN analgesics per MD orders (see MAR). Pain assessed every shift and after interventions.

## 2024-11-16 NOTE — PLAN OF CARE
Problem: Safety - Adult  Goal: Free from fall injury  11/16/2024 1848 by Susan Kay RN  Outcome: Progressing  Flowsheets (Taken 11/15/2024 2202 by Renee Benitez, RN)  Free From Fall Injury: Instruct family/caregiver on patient safety  11/16/2024 1848 by Susan Kay RN  Outcome: Progressing     Problem: Skin/Tissue Integrity  Goal: Absence of new skin breakdown  Description: 1.  Monitor for areas of redness and/or skin breakdown  2.  Assess vascular access sites hourly  3.  Every 4-6 hours minimum:  Change oxygen saturation probe site  4.  Every 4-6 hours:  If on nasal continuous positive airway pressure, respiratory therapy assess nares and determine need for appliance change or resting period.  11/16/2024 1848 by Susan Kay RN  Outcome: Progressing  11/16/2024 1848 by Susan Kay RN  Outcome: Progressing     Problem: Discharge Planning  Goal: Discharge to home or other facility with appropriate resources  11/16/2024 1848 by Susan Kay RN  Outcome: Progressing  Flowsheets (Taken 11/16/2024 1848)  Discharge to home or other facility with appropriate resources:   Identify barriers to discharge with patient and caregiver   Arrange for needed discharge resources and transportation as appropriate   Identify discharge learning needs (meds, wound care, etc)   Arrange for interpreters to assist at discharge as needed   Refer to discharge planning if patient needs post-hospital services based on physician order or complex needs related to functional status, cognitive ability or social support system  11/16/2024 1848 by Susan Kay RN  Outcome: Progressing  Flowsheets (Taken 11/16/2024 1848)  Discharge to home or other facility with appropriate resources:   Identify barriers to discharge with patient and caregiver   Arrange for needed discharge resources and transportation as appropriate   Identify discharge learning needs (meds, wound care, etc)   Arrange for

## 2024-11-17 LAB
ANION GAP SERPL CALCULATED.3IONS-SCNC: 13 MMOL/L (ref 3–16)
BASOPHILS # BLD: 0.1 K/UL (ref 0–0.2)
BASOPHILS NFR BLD: 1.1 %
BUN SERPL-MCNC: 14 MG/DL (ref 7–20)
CALCIUM SERPL-MCNC: 8.6 MG/DL (ref 8.3–10.6)
CHLORIDE SERPL-SCNC: 100 MMOL/L (ref 99–110)
CO2 SERPL-SCNC: 23 MMOL/L (ref 21–32)
CREAT SERPL-MCNC: 0.7 MG/DL (ref 0.8–1.3)
DEPRECATED RDW RBC AUTO: 13.6 % (ref 12.4–15.4)
EOSINOPHIL # BLD: 0.2 K/UL (ref 0–0.6)
EOSINOPHIL NFR BLD: 3 %
GFR SERPLBLD CREATININE-BSD FMLA CKD-EPI: >90 ML/MIN/{1.73_M2}
GLUCOSE SERPL-MCNC: 94 MG/DL (ref 70–99)
HCT VFR BLD AUTO: 25.1 % (ref 40.5–52.5)
HGB BLD-MCNC: 8.7 G/DL (ref 13.5–17.5)
LYMPHOCYTES # BLD: 0.9 K/UL (ref 1–5.1)
LYMPHOCYTES NFR BLD: 16.8 %
MCH RBC QN AUTO: 32.6 PG (ref 26–34)
MCHC RBC AUTO-ENTMCNC: 34.8 G/DL (ref 31–36)
MCV RBC AUTO: 93.6 FL (ref 80–100)
MONOCYTES # BLD: 0.5 K/UL (ref 0–1.3)
MONOCYTES NFR BLD: 9.8 %
NEUTROPHILS # BLD: 3.7 K/UL (ref 1.7–7.7)
NEUTROPHILS NFR BLD: 69.3 %
PLATELET # BLD AUTO: 382 K/UL (ref 135–450)
PMV BLD AUTO: 7.7 FL (ref 5–10.5)
POTASSIUM SERPL-SCNC: 3.8 MMOL/L (ref 3.5–5.1)
RBC # BLD AUTO: 2.68 M/UL (ref 4.2–5.9)
SODIUM SERPL-SCNC: 136 MMOL/L (ref 136–145)
WBC # BLD AUTO: 5.3 K/UL (ref 4–11)

## 2024-11-17 PROCEDURE — 6370000000 HC RX 637 (ALT 250 FOR IP): Performed by: STUDENT IN AN ORGANIZED HEALTH CARE EDUCATION/TRAINING PROGRAM

## 2024-11-17 PROCEDURE — 1200000000 HC SEMI PRIVATE

## 2024-11-17 PROCEDURE — 6370000000 HC RX 637 (ALT 250 FOR IP)

## 2024-11-17 PROCEDURE — 85025 COMPLETE CBC W/AUTO DIFF WBC: CPT

## 2024-11-17 PROCEDURE — 36415 COLL VENOUS BLD VENIPUNCTURE: CPT

## 2024-11-17 PROCEDURE — 80048 BASIC METABOLIC PNL TOTAL CA: CPT

## 2024-11-17 PROCEDURE — 6370000000 HC RX 637 (ALT 250 FOR IP): Performed by: UROLOGY

## 2024-11-17 RX ORDER — HYDROXYZINE HYDROCHLORIDE 10 MG/1
10 TABLET, FILM COATED ORAL ONCE
Status: DISCONTINUED | OUTPATIENT
Start: 2024-11-17 | End: 2024-11-19 | Stop reason: HOSPADM

## 2024-11-17 RX ADMIN — CIPROFLOXACIN HYDROCHLORIDE 500 MG: 500 TABLET, FILM COATED ORAL at 10:50

## 2024-11-17 RX ADMIN — AMLODIPINE BESYLATE 10 MG: 5 TABLET ORAL at 10:50

## 2024-11-17 RX ADMIN — Medication 1 TABLET: at 10:50

## 2024-11-17 RX ADMIN — FLUOXETINE HYDROCHLORIDE 60 MG: 20 SOLUTION ORAL at 10:50

## 2024-11-17 RX ADMIN — RIVASTIGMINE TARTRATE 6 MG: 1.5 CAPSULE ORAL at 10:50

## 2024-11-17 NOTE — PLAN OF CARE
Problem: Safety - Adult  Goal: Free from fall injury  11/17/2024 1210 by Susan Kay RN  Outcome: Progressing  Flowsheets (Taken 11/15/2024 2202 by Renee Benitez, RN)  Free From Fall Injury: Instruct family/caregiver on patient safety  11/16/2024 2241 by Paula Lubin RN  Outcome: Progressing     Problem: Skin/Tissue Integrity  Goal: Absence of new skin breakdown  Description: 1.  Monitor for areas of redness and/or skin breakdown  2.  Assess vascular access sites hourly  3.  Every 4-6 hours minimum:  Change oxygen saturation probe site  4.  Every 4-6 hours:  If on nasal continuous positive airway pressure, respiratory therapy assess nares and determine need for appliance change or resting period.  11/17/2024 1210 by Susan Kay RN  Outcome: Progressing  11/16/2024 2241 by Paula Lubin RN  Outcome: Progressing     Problem: Discharge Planning  Goal: Discharge to home or other facility with appropriate resources  11/17/2024 1210 by Susan Kay RN  Outcome: Progressing  Flowsheets (Taken 11/16/2024 1848)  Discharge to home or other facility with appropriate resources:   Identify barriers to discharge with patient and caregiver   Arrange for needed discharge resources and transportation as appropriate   Identify discharge learning needs (meds, wound care, etc)   Arrange for interpreters to assist at discharge as needed   Refer to discharge planning if patient needs post-hospital services based on physician order or complex needs related to functional status, cognitive ability or social support system  11/16/2024 2241 by Paula Lubin RN  Flowsheets (Taken 11/16/2024 1848 by Susan Kay RN)  Discharge to home or other facility with appropriate resources:   Identify barriers to discharge with patient and caregiver   Arrange for needed discharge resources and transportation as appropriate   Identify discharge learning needs (meds, wound care, etc)   Arrange for interpreters to

## 2024-11-17 NOTE — PLAN OF CARE
Problem: Safety - Adult  Goal: Free from fall injury  11/16/2024 2241 by Paula Lubin RN  Outcome: Progressing  11/16/2024 1848 by Susan Kay RN  Outcome: Progressing  Flowsheets (Taken 11/15/2024 2202 by Renee Benitez, RN)  Free From Fall Injury: Instruct family/caregiver on patient safety  11/16/2024 1848 by Susan Kay RN  Outcome: Progressing     Problem: Skin/Tissue Integrity  Goal: Absence of new skin breakdown  Description: 1.  Monitor for areas of redness and/or skin breakdown  2.  Assess vascular access sites hourly  3.  Every 4-6 hours minimum:  Change oxygen saturation probe site  4.  Every 4-6 hours:  If on nasal continuous positive airway pressure, respiratory therapy assess nares and determine need for appliance change or resting period.  11/16/2024 2241 by Paula Lubin RN  Outcome: Progressing  11/16/2024 1848 by Susan Kay RN  Outcome: Progressing  11/16/2024 1848 by Susan Kay RN  Outcome: Progressing     Problem: Discharge Planning  Goal: Discharge to home or other facility with appropriate resources  11/16/2024 2241 by Paula Lubin RN  Flowsheets (Taken 11/16/2024 1848 by Susan Kay RN)  Discharge to home or other facility with appropriate resources:   Identify barriers to discharge with patient and caregiver   Arrange for needed discharge resources and transportation as appropriate   Identify discharge learning needs (meds, wound care, etc)   Arrange for interpreters to assist at discharge as needed   Refer to discharge planning if patient needs post-hospital services based on physician order or complex needs related to functional status, cognitive ability or social support system  11/16/2024 1848 by Susan Kay RN  Outcome: Progressing  Flowsheets (Taken 11/16/2024 1848)  Discharge to home or other facility with appropriate resources:   Identify barriers to discharge with patient and caregiver   Arrange for needed discharge

## 2024-11-18 LAB
ANION GAP SERPL CALCULATED.3IONS-SCNC: 10 MMOL/L (ref 3–16)
BASOPHILS # BLD: 0 K/UL (ref 0–0.2)
BASOPHILS NFR BLD: 0.5 %
BUN SERPL-MCNC: 19 MG/DL (ref 7–20)
CALCIUM SERPL-MCNC: 8.8 MG/DL (ref 8.3–10.6)
CHLORIDE SERPL-SCNC: 99 MMOL/L (ref 99–110)
CO2 SERPL-SCNC: 25 MMOL/L (ref 21–32)
CREAT SERPL-MCNC: 0.8 MG/DL (ref 0.8–1.3)
DEPRECATED RDW RBC AUTO: 13.7 % (ref 12.4–15.4)
EOSINOPHIL # BLD: 0.1 K/UL (ref 0–0.6)
EOSINOPHIL NFR BLD: 1.7 %
GFR SERPLBLD CREATININE-BSD FMLA CKD-EPI: 90 ML/MIN/{1.73_M2}
GLUCOSE SERPL-MCNC: 106 MG/DL (ref 70–99)
HCT VFR BLD AUTO: 24.7 % (ref 40.5–52.5)
HGB BLD-MCNC: 8.5 G/DL (ref 13.5–17.5)
LYMPHOCYTES # BLD: 1 K/UL (ref 1–5.1)
LYMPHOCYTES NFR BLD: 11 %
MAGNESIUM SERPL-MCNC: 1.98 MG/DL (ref 1.8–2.4)
MCH RBC QN AUTO: 31.8 PG (ref 26–34)
MCHC RBC AUTO-ENTMCNC: 34.2 G/DL (ref 31–36)
MCV RBC AUTO: 92.9 FL (ref 80–100)
MONOCYTES # BLD: 0.8 K/UL (ref 0–1.3)
MONOCYTES NFR BLD: 9.8 %
NEUTROPHILS # BLD: 6.6 K/UL (ref 1.7–7.7)
NEUTROPHILS NFR BLD: 77 %
PHOSPHATE SERPL-MCNC: 3.7 MG/DL (ref 2.5–4.9)
PLATELET # BLD AUTO: 419 K/UL (ref 135–450)
PMV BLD AUTO: 7.2 FL (ref 5–10.5)
POTASSIUM SERPL-SCNC: 3.9 MMOL/L (ref 3.5–5.1)
RBC # BLD AUTO: 2.66 M/UL (ref 4.2–5.9)
SODIUM SERPL-SCNC: 134 MMOL/L (ref 136–145)
WBC # BLD AUTO: 8.6 K/UL (ref 4–11)

## 2024-11-18 PROCEDURE — 84100 ASSAY OF PHOSPHORUS: CPT

## 2024-11-18 PROCEDURE — 99222 1ST HOSP IP/OBS MODERATE 55: CPT | Performed by: STUDENT IN AN ORGANIZED HEALTH CARE EDUCATION/TRAINING PROGRAM

## 2024-11-18 PROCEDURE — 6370000000 HC RX 637 (ALT 250 FOR IP): Performed by: UROLOGY

## 2024-11-18 PROCEDURE — 97530 THERAPEUTIC ACTIVITIES: CPT

## 2024-11-18 PROCEDURE — 1200000000 HC SEMI PRIVATE

## 2024-11-18 PROCEDURE — 6370000000 HC RX 637 (ALT 250 FOR IP)

## 2024-11-18 PROCEDURE — 97535 SELF CARE MNGMENT TRAINING: CPT

## 2024-11-18 PROCEDURE — 83735 ASSAY OF MAGNESIUM: CPT

## 2024-11-18 PROCEDURE — 80048 BASIC METABOLIC PNL TOTAL CA: CPT

## 2024-11-18 PROCEDURE — 36415 COLL VENOUS BLD VENIPUNCTURE: CPT

## 2024-11-18 PROCEDURE — 85025 COMPLETE CBC W/AUTO DIFF WBC: CPT

## 2024-11-18 PROCEDURE — 97110 THERAPEUTIC EXERCISES: CPT

## 2024-11-18 RX ORDER — LIDOCAINE HYDROCHLORIDE 20 MG/ML
JELLY TOPICAL PRN
Qty: 14 EACH | Refills: 0 | Status: ON HOLD | OUTPATIENT
Start: 2024-11-18 | End: 2024-12-02

## 2024-11-18 RX ORDER — POLYETHYLENE GLYCOL 3350 17 G/17G
17 POWDER, FOR SOLUTION ORAL DAILY PRN
Qty: 30 PACKET | Refills: 0 | Status: ON HOLD | OUTPATIENT
Start: 2024-11-18 | End: 2024-12-18

## 2024-11-18 RX ADMIN — ATORVASTATIN CALCIUM 40 MG: 40 TABLET, FILM COATED ORAL at 20:09

## 2024-11-18 RX ADMIN — RIVASTIGMINE TARTRATE 6 MG: 1.5 CAPSULE ORAL at 09:48

## 2024-11-18 RX ADMIN — Medication 1 TABLET: at 09:48

## 2024-11-18 RX ADMIN — AMLODIPINE BESYLATE 10 MG: 5 TABLET ORAL at 09:48

## 2024-11-18 RX ADMIN — RIVASTIGMINE TARTRATE 6 MG: 1.5 CAPSULE ORAL at 20:09

## 2024-11-18 RX ADMIN — FLUOXETINE HYDROCHLORIDE 60 MG: 20 SOLUTION ORAL at 10:00

## 2024-11-18 NOTE — PLAN OF CARE
Problem: Safety - Adult  Goal: Free from fall injury  11/18/2024 0110 by Paula Lubin RN  Outcome: Progressing  11/17/2024 1210 by Susan Kay RN  Outcome: Progressing  Flowsheets (Taken 11/15/2024 2202 by Renee Benitez, RN)  Free From Fall Injury: Instruct family/caregiver on patient safety     Problem: Skin/Tissue Integrity  Goal: Absence of new skin breakdown  Description: 1.  Monitor for areas of redness and/or skin breakdown  2.  Assess vascular access sites hourly  3.  Every 4-6 hours minimum:  Change oxygen saturation probe site  4.  Every 4-6 hours:  If on nasal continuous positive airway pressure, respiratory therapy assess nares and determine need for appliance change or resting period.  11/18/2024 0110 by Paula Lubin RN  Outcome: Progressing  11/17/2024 1210 by Susan Kay RN  Outcome: Progressing     Problem: Discharge Planning  Goal: Discharge to home or other facility with appropriate resources  11/18/2024 0110 by Paula Lubin RN  Outcome: Progressing  11/17/2024 1210 by Susan Kay RN  Outcome: Progressing  Flowsheets (Taken 11/16/2024 1848)  Discharge to home or other facility with appropriate resources:   Identify barriers to discharge with patient and caregiver   Arrange for needed discharge resources and transportation as appropriate   Identify discharge learning needs (meds, wound care, etc)   Arrange for interpreters to assist at discharge as needed   Refer to discharge planning if patient needs post-hospital services based on physician order or complex needs related to functional status, cognitive ability or social support system     Problem: Pain  Goal: Verbalizes/displays adequate comfort level or baseline comfort level  11/18/2024 0110 by Paula Lubin RN  Outcome: Progressing  11/17/2024 1210 by Susan Kay RN  Outcome: Progressing  Flowsheets (Taken 11/16/2024 1848)  Verbalizes/displays adequate comfort level or baseline comfort level:

## 2024-11-18 NOTE — PLAN OF CARE
Problem: Safety - Adult  Goal: Free from fall injury  11/18/2024 0740 by Yadira Jacobsen RN  Outcome: Progressing  Flowsheets (Taken 11/18/2024 0740)  Free From Fall Injury:   Instruct family/caregiver on patient safety   Based on caregiver fall risk screen, instruct family/caregiver to ask for assistance with transferring infant if caregiver noted to have fall risk factors  11/18/2024 0110 by Paula Lubin RN  Outcome: Progressing     Problem: Skin/Tissue Integrity  Goal: Absence of new skin breakdown  Description: 1.  Monitor for areas of redness and/or skin breakdown  2.  Assess vascular access sites hourly  3.  Every 4-6 hours minimum:  Change oxygen saturation probe site  4.  Every 4-6 hours:  If on nasal continuous positive airway pressure, respiratory therapy assess nares and determine need for appliance change or resting period.  11/18/2024 0110 by Paula Lubin RN  Outcome: Progressing     Problem: Discharge Planning  Goal: Discharge to home or other facility with appropriate resources  11/18/2024 0740 by Yadira Jacobsen RN  Outcome: Progressing  Flowsheets (Taken 11/18/2024 0740)  Discharge to home or other facility with appropriate resources:   Identify barriers to discharge with patient and caregiver   Identify discharge learning needs (meds, wound care, etc)   Refer to discharge planning if patient needs post-hospital services based on physician order or complex needs related to functional status, cognitive ability or social support system   Arrange for interpreters to assist at discharge as needed   Arrange for needed discharge resources and transportation as appropriate  11/18/2024 0110 by Paula Lubin RN  Outcome: Progressing     Problem: Pain  Goal: Verbalizes/displays adequate comfort level or baseline comfort level  11/18/2024 0740 by Yadira Jacobsen RN  Outcome: Progressing  Flowsheets (Taken 11/18/2024 0740)  Verbalizes/displays adequate comfort level or baseline comfort level:

## 2024-11-18 NOTE — CARE COORDINATION
Hospital day 6: Patient on C3 re acute blood loss anemia care managed by IM and Urology- pending Neurology. Plan is to dc with cho and have outpatient follow up with Urology. Patient will need [facility] skilled nursing to return and leg bag to manage cho. Spoke with Son Grayson this date and updated. Following.BARNEY Ley

## 2024-11-18 NOTE — CONSULTS
Consult Placed   Consult sent via perfect serve  Who:  Dr. Abraham  Date: 11/18/2024  Time: 10:59 AM       Electronically signed by Tricia Murillo on 11/18/2024 at 10:58 AM

## 2024-11-18 NOTE — CONSULTS
Inpatient Neurology Consult  [facility]  Ricardo Abraham MD    Brent Mina  1945    Date of Service: 11/18/2024    Referring Physician: Cristi Duran MD    Reason for the consult and CC: Alzheimer's disease and tremor    HPI:   Brent Mina is a 79 y.o. male who  has a past medical history of ADHD (attention deficit hyperactivity disorder), Alzheimer disease ([facility]), Hyperlipidemia, Hypertension, IFG (impaired fasting glucose), OCD (obsessive compulsive disorder), Osteoarthritis, Prostate cancer (HCC), Renal insufficiency, and TIA (transient ischemic attack). Admitted for acute on chronic anemia due to gross hematuria complicated by SUBHASH (improved) s/p cystoscopy and transurethral incision of bladder neck contracture and clot evacuation 11/13/24. No previous neurology notes found. Afebrile and hemodynamically stable during admission. Pertinent PTA medications include aspirin 81mg daily, atorvastatin 40mg/day, rivastigmine 6mg BID, Prozac 60mg/day.     Patient interview: He has very poor insight. Tremor does not bother him.   Interview with wife: He has longstanding memory issues. Gradual progressive. He was wandering outside of the home when living with wife then 2 years ago moved to memory care unit. Not sure how long he has been using rivastigmine and Prozac nor if they have been helpful. He did have a fall with skull fracture somewhat recently. She has not appreciated a tremor. No observed visual hallucinations. He has depression that appears to be well controlled with medications. However, he is very anxious in the hospital.     I personally reviewed and updated social history, past medical history, medications, allergy, surgical history, and family history as documented in the patient's electronic health records.     ROS: 10-14 ROS reviewed with the patient/nurse/family which were unremarkable except mentioned in H&P.    Physical Examination  Vitals:    11/17/24 2320 11/18/24 0945

## 2024-11-19 VITALS
TEMPERATURE: 97.8 F | HEART RATE: 65 BPM | OXYGEN SATURATION: 97 % | RESPIRATION RATE: 16 BRPM | BODY MASS INDEX: 18.66 KG/M2 | DIASTOLIC BLOOD PRESSURE: 68 MMHG | SYSTOLIC BLOOD PRESSURE: 129 MMHG | WEIGHT: 137.57 LBS

## 2024-11-19 LAB
ANION GAP SERPL CALCULATED.3IONS-SCNC: 13 MMOL/L (ref 3–16)
BASOPHILS # BLD: 0.1 K/UL (ref 0–0.2)
BASOPHILS NFR BLD: 0.9 %
BUN SERPL-MCNC: 18 MG/DL (ref 7–20)
CALCIUM SERPL-MCNC: 9.2 MG/DL (ref 8.3–10.6)
CHLORIDE SERPL-SCNC: 100 MMOL/L (ref 99–110)
CO2 SERPL-SCNC: 25 MMOL/L (ref 21–32)
CREAT SERPL-MCNC: 1.1 MG/DL (ref 0.8–1.3)
DEPRECATED RDW RBC AUTO: 13.7 % (ref 12.4–15.4)
EOSINOPHIL # BLD: 0.1 K/UL (ref 0–0.6)
EOSINOPHIL NFR BLD: 2.3 %
GFR SERPLBLD CREATININE-BSD FMLA CKD-EPI: 68 ML/MIN/{1.73_M2}
GLUCOSE SERPL-MCNC: 91 MG/DL (ref 70–99)
HCT VFR BLD AUTO: 28.2 % (ref 40.5–52.5)
HGB BLD-MCNC: 9.8 G/DL (ref 13.5–17.5)
LYMPHOCYTES # BLD: 1 K/UL (ref 1–5.1)
LYMPHOCYTES NFR BLD: 15.5 %
MCH RBC QN AUTO: 32.3 PG (ref 26–34)
MCHC RBC AUTO-ENTMCNC: 34.6 G/DL (ref 31–36)
MCV RBC AUTO: 93.3 FL (ref 80–100)
MONOCYTES # BLD: 0.7 K/UL (ref 0–1.3)
MONOCYTES NFR BLD: 10.2 %
NEUTROPHILS # BLD: 4.6 K/UL (ref 1.7–7.7)
NEUTROPHILS NFR BLD: 71.1 %
PLATELET # BLD AUTO: 398 K/UL (ref 135–450)
PMV BLD AUTO: 8.7 FL (ref 5–10.5)
POTASSIUM SERPL-SCNC: 4 MMOL/L (ref 3.5–5.1)
RBC # BLD AUTO: 3.02 M/UL (ref 4.2–5.9)
SODIUM SERPL-SCNC: 138 MMOL/L (ref 136–145)
WBC # BLD AUTO: 6.4 K/UL (ref 4–11)

## 2024-11-19 PROCEDURE — 85025 COMPLETE CBC W/AUTO DIFF WBC: CPT

## 2024-11-19 PROCEDURE — 36415 COLL VENOUS BLD VENIPUNCTURE: CPT

## 2024-11-19 PROCEDURE — 6370000000 HC RX 637 (ALT 250 FOR IP)

## 2024-11-19 PROCEDURE — 6370000000 HC RX 637 (ALT 250 FOR IP): Performed by: UROLOGY

## 2024-11-19 PROCEDURE — 80048 BASIC METABOLIC PNL TOTAL CA: CPT

## 2024-11-19 ASSESSMENT — PAIN SCALES - GENERAL: PAINLEVEL_OUTOF10: 0

## 2024-11-19 NOTE — PROGRESS NOTES
Pt Name: Brent Mina  Medical Record Number: [id number]  Date of Birth 1945   Today's Date: 11/16/2024      Subjective: Patient failed voiding trial and had to replace Sagastume.    ROS: Constitutional: No fever    Vitals:  Vitals:    11/15/24 0900 11/15/24 2015 11/15/24 2333 11/16/24 1116   BP: 106/67 110/68  121/71   Pulse: 77 74     Resp: 16 16 18    Temp: 97.4 °F (36.3 °C) 97.6 °F (36.4 °C)     TempSrc: Axillary Oral Oral    SpO2: 98% 98%     Weight:         I/O last 3 completed shifts:  In: -   Out: 2050 [Urine:2050]    Exam:  General: Awake, forgetful, no acute distress  Respiratory: Nonlabored breathing  Abdomen: Soft, non-tender, non-distended, no masses  : voiding trial  Skin: Skin color, texture, turgor normal, no rashes or lesions  Neurologic: no gross deficits    CURRENT MEDICATIONS   Scheduled Meds:   FLUoxetine  60 mg Oral Daily    amLODIPine  10 mg Oral Daily    atorvastatin  40 mg Oral Nightly    ciprofloxacin  500 mg Oral BID    therapeutic multivitamin-minerals  1 tablet Oral Daily    rivastigmine  6 mg Oral BID    sodium chloride flush  5-40 mL IntraVENous 2 times per day     Continuous Infusions:   sodium chloride       PRN Meds:.lidocaine, sodium chloride flush, sodium chloride, polyethylene glycol, acetaminophen **OR** acetaminophen, prochlorperazine    LABS     Recent Labs     11/13/24  1739 11/15/24  0426 11/16/24  0436   WBC  --   --  5.8   HGB 8.3*  --  8.4*   HCT 24.6*  --  24.2*   PLT  --   --  351   NA  --  134*  --    K  --  3.9  --    CL  --  101  --    CO2  --  22  --    BUN  --  22*  --    CREATININE  --  0.9  --    CALCIUM  --  8.1*  --      ASSESSMENT   Hospital day # 4  79y.o. male who was diagnosed with an 8.5cm blood clot within the bladder per CT on 11/10/24 during ED visit. Discharged back to nursing facility and returned with continued/worsening GH.  S/p cystoscopy, transurethral incision of bladder neck contracture and clot evacuation with Dr. Huang on 
    Pt Name: Brent Mina  Medical Record Number: [id number]  Date of Birth 1945   Today's Date: 11/14/2024      Subjective:  Awake in bed, eating breakfast. No complaints. Doesn't remember going to OR yesterday. CBI minimally running with clear/yellow output. This was stopped.     ROS: Constitutional: No fever    Vitals:  Vitals:    11/13/24 1545 11/13/24 2131 11/13/24 2324 11/14/24 0821   BP: 115/62 (!) 99/56 111/62 108/61   Pulse: 60 70 67 59   Resp: 13 16 18 16   Temp:  98.5 °F (36.9 °C) 98.3 °F (36.8 °C) 97.6 °F (36.4 °C)   TempSrc:  Oral Oral Oral   SpO2:  98% 97% 98%   Weight:         I/O last 3 completed shifts:  In: 650 [P.O.:640; I.V.:10]  Out: 1275 [Urine:1275]    Exam:  General: Awake, forgetful, no acute distress  Respiratory: Nonlabored breathing  Abdomen: Soft, non-tender, non-distended, no masses  : Sagastume catheter intact with clear/yellow output  Skin: Skin color, texture, turgor normal, no rashes or lesions  Neurologic: no gross deficits    CURRENT MEDICATIONS   Scheduled Meds:   amLODIPine  10 mg Oral Daily    atorvastatin  40 mg Oral Nightly    ciprofloxacin  500 mg Oral BID    FLUoxetine  60 mg Oral Daily    therapeutic multivitamin-minerals  1 tablet Oral Daily    rivastigmine  6 mg Oral BID    sodium chloride flush  5-40 mL IntraVENous 2 times per day     Continuous Infusions:   sodium chloride       PRN Meds:.lidocaine, sodium chloride flush, sodium chloride, polyethylene glycol, acetaminophen **OR** acetaminophen, prochlorperazine    LABS     Recent Labs     11/12/24  1740 11/12/24  1800 11/13/24  0059 11/13/24  0702 11/13/24  1739   WBC 8.9  --   --   --   --    HGB 8.2*  --  8.4* 8.0* 8.3*   HCT 24.5*  --  24.8* 23.9* 24.6*     --   --   --   --      --   --  140  --    K 4.4  --   --  4.1  --      --   --  105  --    CO2 23  --   --  25  --    BUN 33*  --   --  30*  --    CREATININE 1.2  --   --  0.8  --    CALCIUM 8.9  --   --  8.5  --    INR  --  0.97  --   
  4 Eyes Skin Assessment and Patient belongings     The patient is being assess for  Admission    I agree that 2 Nurses have performed a thorough Head to Toe Skin Assessment on the patient. ALL assessment sites listed below have been assessed.       Areas assessed by both nurses: Vasyl MCGUIRE / Fortunato MCGUIRE   [x]   Head, Face, and Ears   [x]   Shoulders, Back, and Chest  [x]   Arms, Elbows, and Hands   [x]   Coccyx, Sacrum, and IschIum  [x]   Legs, Feet, and Heels        Does the Patient have Skin Breakdown?  No         Eleuterio Prevention initiated:  No   Wound Care Orders initiated:  No      [facility] nurse consulted for Pressure Injury (Stage 3,4, Unstageable, DTI, NWPT, and Complex wounds), New and Established Ostomies:  No      I agree that 2 Nurses have reviewed patient belongings with the patient/family and documented in the flowsheet upon admission or transfer to the unit.     Belongings  Dental Appliances: None  Vision - Corrective Lenses: None  Hearing Aid: None  Clothing: Pants, Shirt, Footwear  Jewelry: None  Electronic Devices: None  Weapons (Notify Protective Services/Security): None  Home Medications: None  Valuables Given To: Security  Provide Name(s) of Who Valuable(s) Were Given To: PATIENT ROOM       Nurse 1 eSignature: Electronically signed by Vasyl López RN on 11/13/24 at 4:49 AM EST    **SHARE this note so that the co-signing nurse is able to place an eSignature**    Nurse 2 eSignature: Electronically signed by Fortunato Blue RN on 11/13/24 at 6:36 AM EST  
  [facility] Medicine Progress Note  V 10.25      Date of Admission: 11/12/2024    Hospital Day: 7      Chief Admission Complaint:  Gross hematuria    Subjective:  Patient seen at bedside this morning. No acute concerns. Patient cho in place, draining well, no hematuria. Patient no pain anywhere, fevers, chills.    Presenting Admission History:       79 y.o. male with a significant past medical history of hypertension, hyperlipidemia, prior prostate cancer, and Alzheimer disease who presents to [facility]'s emergency department from a local memory care unit due to concerns of hematuria. Of note, he was actually seen here on 11/9 for same complaint when he developed hematuria that same day. He was seen here that day, CT showed bladder mass versus clot burden, also had evidence of possible UTI so he was treated outpatient with ciprofloxacin. He was discharged on the morning of 11/10 from the ED, but now has returned. There is no other documentation of complaints or concerns by the staff there, simply just due to ongoing hematuria. His evaluation here included laboratory studies. Most notable on labs is hemoglobin went from 11.1-8.2 this evening. Does have a slightly elevated creatinine of 1.2 which was last 0.8 on 11/9. Urinalysis tonight continues to show turbid red urine with greater than 100 RBCs on microscopy. He was treated with IV ciprofloxacin 400 mg this evening. Hospital team was consulted to admit.     Assessment/Plan:      Current Principal Problem:  Acute blood loss anemia (ABLA)    #Acute on chronic anemia secondary to gross hematuria  S/p cystoscopy, transurethral incision of bladder neck contracture and clot evacuation 11/13/24   #SUBHASH, improved  #Prostate cancer hx  Ciprofloxacin course completed  Plan:  Urology consulted, appreciate recommendations  Continue to monitor  Cho catheter in place, voiding trial per Urology  Continue to trend Cr daily    #HTN  Plan:  Continue 
  Hospital Medicine Progress Note  V 10.25      Date of Admission: 11/12/2024  Hospital Day: 4    Chief Admission Complaint:  Gross hematuria    Subjective: Sagastume removed this AM; no urination after 6 hrs but bladder scan was low. He is not drinking much fluids today. He has no complaints.     Presenting Admission History:       79 y.o. male with a significant past medical history of hypertension, hyperlipidemia, prior prostate cancer, and Alzheimer disease who presents to [facility]'s emergency department from a local memory care unit due to concerns of hematuria.  Of note, he was actually seen here on 11/9 for same complaint when he developed hematuria that same day.  He was seen here that day, CT showed bladder mass versus clot burden, also had evidence of possible UTI so he was treated outpatient with ciprofloxacin.  He was discharged on the morning of 11/10 from the ED, but now has returned.  There is no other documentation of complaints or concerns by the staff there, simply just due to ongoing hematuria.  His evaluation here included laboratory studies.  Most notable on labs is hemoglobin went from 11.1-8.2 this evening.  Does have a slightly elevated creatinine of 1.2 which was last 0.8 on 11/9.  Urinalysis tonight continues to show turbid red urine with greater than 100 RBCs on microscopy.  He was treated with IV ciprofloxacin 400 mg this evening.  Hospital team was consulted to admit.     Assessment/Plan:       Acute on Chronic Anemia 2/2 Acute Blood Loss Anemia  Gross Hematuria  - Hold ASA  - Urology consulted; on Cystoscopy, bladder neck stricture was noted and was likely source of bleeding. Dilation performed, hemostasis obtained and bladder irrigated until clear.  No further hematuria; CBI stopped. Voiding trial 11/15; may need IVF if low urine output/bladder scan.   -Monitor serial Hb      Renal Insufficiency  - Cr 1.2 on admission, 0.8 on 11/9  - Trend creatinine, baseline 0.8-1.2 on 
  [facility] Medicine Progress Note  V 10.25      Date of Admission: 11/12/2024  Hospital Day: 2    Chief Admission Complaint:  Gross hematuria    Subjective:  Seen after cystoscopy. No current hematuria noted, with CBI running. He is calm, pleasant, not well oriented. Offers no complaints.     Presenting Admission History:       79 y.o. male with a significant past medical history of hypertension, hyperlipidemia, prior prostate cancer, and Alzheimer disease who presents to [facility]'s emergency department from a local memory care unit due to concerns of hematuria.  Of note, he was actually seen here on 11/9 for same complaint when he developed hematuria that same day.  He was seen here that day, CT showed bladder mass versus clot burden, also had evidence of possible UTI so he was treated outpatient with ciprofloxacin.  He was discharged on the morning of 11/10 from the ED, but now has returned.  There is no other documentation of complaints or concerns by the staff there, simply just due to ongoing hematuria.  His evaluation here included laboratory studies.  Most notable on labs is hemoglobin went from 11.1-8.2 this evening.  Does have a slightly elevated creatinine of 1.2 which was last 0.8 on 11/9.  Urinalysis tonight continues to show turbid red urine with greater than 100 RBCs on microscopy.  He was treated with IV ciprofloxacin 400 mg this evening.  Hospital team was consulted to admit.     Assessment/Plan:       Acute on Chronic Anemia 2/2 Acute Blood Loss Anemia  Gross Hematuria  - Hold ASA  - Urology consulted; on Cystoscopy, bladder neck stricture was noted and was likely source of bleeding. Dilation performed, hemostasis obtained and bladder irrigated until clear.  Continue CBI.  -Monitor serial Hb and transfuse as needed.      Renal Insufficiency  - Cr 1.2 on admission, 0.8 on 11/9  - Trend creatinine, baseline 0.8-1.2 on recent labs  - Monitor daily BMP      Hypertension  - Stable  - 
Assessment completed and documented. VSS. A/o only to self. Pt takes medication crushed in applesauce, tolerated well without coughing. Pt has cho draining yellow in color. Pt has new onset hand tremors, MD aware. Pt resting with eyes closed when writer left room. Bed locked and in lowest position. Bedside table and call light within reach. Denies further needs at this time.   
Occupational Therapy  Facility/Department: [facility] C3 TELE/MED SURG/ONC  Occupational Therapy Initial Assessment/ Treatment Note    Name: Brent Mina  : 1945  MRN: [id number]  Date of Service: 11/15/2024    Discharge Recommendations:  Long Term Care with OT  OT Equipment Recommendations  Equipment Needed: No     Patient Diagnosis(es): The encounter diagnosis was Gross hematuria.  Past Medical History:  has a past medical history of ADHD (attention deficit hyperactivity disorder), Alzheimer disease (HCC), Cancer (HCC), Hyperlipidemia, Hypertension, IFG (impaired fasting glucose), OCD (obsessive compulsive disorder), Osteoarthritis, Renal insufficiency, and TIA (transient ischemic attack).  Past Surgical History:  has a past surgical history that includes Colonoscopy (); Eye surgery; Prostatectomy; Intracapsular cataract extraction (Right, 10/21/2019); Intracapsular cataract extraction (Left, 10/31/2019); Femur fracture surgery (Left, 2023); and Cystoscopy (N/A, 2024).    Assessment  Performance deficits / Impairments: Decreased functional mobility ;Decreased ADL status;Decreased strength;Decreased safe awareness;Decreased endurance;Decreased balance;Decreased cognition  Assessment: 78 y/o male presents to [facility] c acute blood loss anemia. Pt from [facility] where he is w/c bound and receives assist for all ADLs. Pt currently completing bed mobility mod x2 bed mobility/ STS/ stand pivot EOB > chair c B HHA and total A LB ADLs. Pt is functioning slightly below baseline and would benefit from skilled OT services to improve functional IND and safety. OT rec return LTC w/ OT, cont acute OT.   Prognosis: Fair  Decision Making: Medium Complexity  REQUIRES OT FOLLOW-UP: Yes  Activity Tolerance  Activity Tolerance: Patient Tolerated treatment well;Patient limited by fatigue;Treatment limited secondary to decreased cognition     Plan  Occupational Therapy Plan  Times Per Week: 
Patient admitted to [facility] 1 in preparation for surgery, VSS. Consent confirmed. IV inserted into left forearm, LR infusing. Belongings in pt room. NPO since 2300. Call light within reach.    
Patient arrived to PACU bay 1, phase one initiated. Placed on bedside monitor, VSS. Report obtained from OR RN and anesthesia. Patient on O2 via nasal cannula at 2L. See flowsheets for assessment. Warm blankets applied. Side rails in place, will monitor patient closely.   
Patient returned from surgery. VSS. Pt Alert to self only. Avasure in place. CBI running and clear.  Call light in reach.   
Physical Therapy  Facility/Department: [facility] C3 TELE/MED SURG/ONC  Physical Therapy Initial Assessment/treatment    Name: Brent Mina  : 1945  MRN: [id number]  Date of Service: 11/15/2024    Discharge Recommendations:  Long Term Care with PT          Patient Diagnosis(es): The encounter diagnosis was Gross hematuria.  Past Medical History:  has a past medical history of ADHD (attention deficit hyperactivity disorder), Alzheimer disease (HCC), Cancer (HCC), Hyperlipidemia, Hypertension, IFG (impaired fasting glucose), OCD (obsessive compulsive disorder), Osteoarthritis, Renal insufficiency, and TIA (transient ischemic attack).  Past Surgical History:  has a past surgical history that includes Colonoscopy (); Eye surgery; Prostatectomy; Intracapsular cataract extraction (Right, 10/21/2019); Intracapsular cataract extraction (Left, 10/31/2019); Femur fracture surgery (Left, 2023); and Cystoscopy (N/A, 2024).    Assessment  Body Structures, Functions, Activity Limitations Requiring Skilled Therapeutic Intervention: Decreased functional mobility ;Decreased endurance;Increased pain;Decreased balance;Decreased strength;Decreased safe awareness;Decreased ROM;Decreased cognition  Assessment: Pt presents to [facility] due to acute blood loss anemia. PTA, pt lives with at LTC and performs functional mobility with assist for transfers, at w/c level. Currently, pt functioning below baseline level and requires Ax2 for bed mobility, transfers. Pt demonstrates impaired BLE strength and requires increased time for all mobility. Pt would benefit from continued skilled PT to address current deficits. Recommend return to LTC with PT upon d/c    Treatment Diagnosis: impaired functional mobility  Specific Instructions for Next Treatment: progress functional mobility as indicated  Therapy Prognosis: Fair  Decision Making: Medium Complexity  Requires PT Follow-Up: Yes  Activity Tolerance  Activity Tolerance: Patient 
Pt a/o. VSS. Shift assessment updated and documented. CBI in progress maintained at a slower rate , output its a light yellow and  clear with no evidence or sign of bleeding.   
Pt a/o. VSS. Shift assessment updated and documented. Patient from ED  , He is only oriented to self . CBI in progress  , no  clots noted but has bright red blood intermittently. Assessed for swallowing prior to intake  , swallowed pills whole with thin fluids . Cho care and CHG also completed .  Was fidgeting with his cho , patient often confused  , Avasys ordered  , bed locked in lowest position with bed alarm kept on.    
[facility] Report given to SHAYLA Teran. Tanika at discharge. ROBERTO completed and given to transport. Sagastume care completed prior to discharge. Pt AO1, unable to be given discharge instructions. X2 leg bags given to transport with patient belongings.    
Pt highly agitated and becoming hostile and combative when approached. Refusing all meds and and treatments. Only response is \"NO\". Found football on TV for him and sat with him for a bit until he calmed some, though he still refused to take his meds or allow me to take his vitals. Rather than continue to upset him, did not continue to try to make him. Gave him a chocolate premier protein shake which he seemed to enjoy and sat quietly watching football with him.     
Pt initally agreeable to taking PO medication this morning. Patient became agitated during medication administration, began to push this nurse away. Patient tolerated about half his dose of prozac and refused all other medication. Pt currently non-interactive, will respond with \"will you shut up?\" To any questioning. Hands appear to be visibly tremoring in relation to agitating. Light brought down in the room, Tv muted and patient laid back down due to agitation.   
Pt is feeling anxious and scared this evening. Pt states he is scared but is not sure why. Pt denies pain. Wife bedside. Wife is asking for pt to have something available in the evenings to help alleviate his fear. Secure message sent to hospitalist to address. Currently awaiting response.   
Shift assessment completed. Pt A&O to self.  VSS. Denies Pain. IV site patent, flushed, and saline locked. Patient on RA. Pt has a 8.5cm x 8.5cm blood clot in the bladder. Pt had a CBI that was running wide open and flowing great. Around 11:30am Rn noticed that the bag did not have much fluid in it. When RN went into check the CBI it was clotted off. RN and two other RN irrigated the cho four times and was unable to get it flowing. RN paged Susan Beckett from urology to try and fix the CBI. When Susan came bedside she was able to pull about 1000ml of very dark red fluids and lots of clots out. She instructed RN to leave the CBI turned off the Pt is going to the OR today to have the clot evacuated. Patient ambulates with SBA to bathroom. Medication given per MAR.           Pt used 2 3000ml bags and we spiked another bag but had to turn it off before much of that bag was used.     Standard precautions   Denies any needs at this time.   Bed alarm on for safety.   Bed locked and in lowest position.    Call light within reach.   Gripper socks applied.   Patient in stable condition when RN leaving room.   
Shift assessment completed. Pt A&O x4.  VSS.  Denies Pain. Pt removed IV overnight MD is aware. Patient on RA. Pt had cho removed this morning around nine am. The Pt is not on IV fluids and does not take in that much fluid so he has still urinated. Md said to wait no more than 10 hours for urination. Medication given per MAR.                Standard precautions   Denies any needs at this time.   Bed alarm on for safety.   Bed locked and in lowest position.    Call light within reach.   Gripper socks applied.   Patient in stable condition when RN leaving room.   
Spoke to pharmacy and requested oral fluoxetine solution in lieu of capsules.   
Status: Impaired  Orientation Level: Disoriented to place;Disoriented to time;Disoriented to situation;Oriented to person    Objective  Vitals  Pulse: (!) 106  Heart Rate Source: Monitor  BP: 120/70  BP Location: Left upper arm  BP Method: Automatic  Patient Position: Semi fowlers  MAP (Calculated): 87  SpO2: 99 %  O2 Device: None (Room air)  Comment: After transfer to the chair /69 and HR increased to 130's decreased back to 115 with seated rest  Bed Mobility Training  Bed Mobility Training: Yes  Interventions: Verbal cues;Safety awareness training  Supine to Sit: Minimum assistance;Additional time;Adaptive equipment (HOB elevated)  Sit to Supine: Other (comment) (In chair at end of session)  Balance  Sitting: Impaired (posterior lean)  Sitting - Static: Fair (occasional)  Sitting - Dynamic: Fair (occasional)  Standing - Static: Constant support;Poor  Standing - Dynamic: Constant support;Poor  Transfer Training  Transfer Training: Yes  Sit to Stand: Moderate assistance;Assist X2 (B HHA)  Stand to Sit: Moderate assistance;Assist X2  Bed to Chair: Moderate assistance;Assist X2 (c B HHA, vc's for sequencing/ safety)     PT Exercises  Exercise Treatment: seated BLE exercises 10x each: AP, LAQ, marches, hip abduction with min to mod A for sequencing and for full ROM     Safety Devices  Type of Devices: All fall risk precautions in place;Chair alarm in place;Gait belt;Call light within reach;Left in chair;Nurse notified;Patient at risk for falls;Telesitter in use  Restraints  Restraints Initially in Place: No      Goals  Short Term Goals  Time Frame for Short Term Goals: 11/22/24 (7 days) unless otherwise stated -- 11/18 continue all goals  Short Term Goal 1: Pt will perform supine <> sit with min(A) -- MET 11/18; progress to SBA  Short Term Goal 2: Pt will perform all transfers with LRAD and min(A)  Short Term Goal 3: Pt will ambulate 10 ft with LRAD and min(A)  Short Term Goal 4: Pt will perform 10 reps of BLE 
with an 8.5cm blood clot within the bladder per CT on 11/10/24 during ED visit. Discharged back to nursing facility and returned with continued/worsening GH.  S/p cystoscopy, transurethral incision of bladder neck contracture and clot evacuation with Dr. Huang on 11/13/24  HGB stable 8.3.   PLAN   Voiding trial today, hopefully discharge without cho catheter. If cho catheter is replaced. F/u outpatient in 1-2 weeks.     Urology will sign off, call with questions.     Susan Beckett, ADRIANA - CNP 11/15/2024 8:49 AM    
  Pulse: 97   Resp: 16   Temp: 97.4 °F (36.3 °C)   SpO2: 99%       Bed Mobility Training  Bed Mobility Training: Yes  Interventions: Verbal cues;Safety awareness training  Supine to Sit: Minimum assistance;Additional time;Adaptive equipment (HOB elevated)  Sit to Supine: Other (comment) (In chair at end of session)  Balance  Sitting: Impaired (posterior lean)  Sitting - Static: Fair (occasional)  Sitting - Dynamic: Fair (occasional)  Standing - Static: Constant support;Poor  Standing - Dynamic: Constant support;Poor  Transfer Training  Transfer Training: Yes  Sit to Stand: Moderate assistance;Assist X2 (B HHA)  Stand to Sit: Moderate assistance;Assist X2  Bed to Chair: Moderate assistance;Assist X2 (c B HHA, vc's for sequencing/ safety)     ADL  Grooming: Stand by assistance;Setup;Verbal cueing;Increased time to complete  Grooming Skilled Clinical Factors: seated in chair to wash face and perform oral hygiene, VC to initiate  Toileting: Dependent/Total  Toileting Skilled Clinical Factors: +cho  Functional Mobility: Dependent/Total  Functional Mobility Skilled Clinical Factors: modx2 stand pivot w/ BUE HHA, EOB > chair, pt non-ambulatory at baseline  Skin Care: Bath wipes;Other (comment) (cho wipes)        Safety Devices  Type of Devices: All fall risk precautions in place;Chair alarm in place;Gait belt;Call light within reach;Left in chair;Nurse notified;Patient at risk for falls;Telesitter in use  Restraints  Restraints Initially in Place: No    Patient Education  Education Given To: Patient  Education Provided: Role of Therapy;Transfer Training;Plan of Care;Equipment;Precautions;Orientation;Mobility Training  Education Provided Comments: Pt educated on importance of OOB mobility, prevention of complications of bedrest, and general safety during hospitalization  Education Method: Demonstration;Verbal  Barriers to Learning: Cognition  Education Outcome: Unable to verbalize;Unable to demonstrate 
11/18/24  Barriers to Discharge: Sagastume catheter removal, Urology recommendations  --------------------------------------------------    MDM (any 2 required for High level billing)    A. Problems (any 1)  [x] Acute/Chronic Illness/injury posing ongoing threat to life and/or bodily function without ongoing treatment    [x] Severe exacerbation of chronic illness    --------------------------------------------------  B. Risk of Treatment (any 1)    [x] Drugs/treatments that require intensive monitoring for toxicity    [x] IV ABX (Vancomycin, Aminoglycosides, etc)     [] Post-Cath/Contrast study requiring serial monitoring    [] IV Narcotic analgesia    [] Aggressive IV diuresis    [] Hypertonic Saline    [] Critical electrolyte abnormalities requiring IV replacement    [] Insulin - Scheduled/SSI or Insulin gtt    [] Anticoagulation (Heparin gtt or Coumadin - other anticoagulants in special circumstances)    [] Cardiac Medications (IV Amiodarone/Diltiazem, Tikosyn, etc)    [] Hemodialysis    [] Other -    [] Change in code status    [] Decision to escalate care    [] Major surgery/procedure with associated risk factors    --------------------------------------------------  C. Data (any 2)    [x] Data Review (any 3)    [x] Consultant notes from yesterday/today    [x] All available current labs reviewed interpreted for clinical significance    [x] Appropriate follow-up labs were ordered  [] Collateral history obtained     [] Independent Interpretation of tests (any 1)    [] Telemetry (Rhythm Strip) personally reviewed and interpreted        [] Imaging personally reviewed and interpreted     [x] Discussion (any 1)  [x] Multi-Disciplinary Rounds with Case Management  [] Discussed management of the case with           Labs:  Personally reviewed on 11/16/2024 and interpreted for clinical significance as documented above.     Recent Labs     11/13/24  7048 11/16/24  0436   WBC  --  5.8   HGB 8.3* 8.4*   HCT 24.6* 24.2*   PLT  
Rehab    Multi-Disciplinary Rounds with Case Management completed on 11/17/2024 with the following recommendations:  Anticipated Discharge Location: []Home w/ []HHC vs [x]SNF  []Acute Rehab  [x]LTAC  []Hospice  []Other -    Anticipated Discharge Day/Date:  11/19/24  Barriers to Discharge: Sagastume catheter removal, Urology recommendations  --------------------------------------------------    MDM (any 2 required for High level billing)    A. Problems (any 1)  [x] Acute/Chronic Illness/injury posing ongoing threat to life and/or bodily function without ongoing treatment    [x] Severe exacerbation of chronic illness    --------------------------------------------------  B. Risk of Treatment (any 1)    [x] Drugs/treatments that require intensive monitoring for toxicity    [x] IV ABX (Vancomycin, Aminoglycosides, etc)     [] Post-Cath/Contrast study requiring serial monitoring    [] IV Narcotic analgesia    [] Aggressive IV diuresis    [] Hypertonic Saline    [] Critical electrolyte abnormalities requiring IV replacement    [] Insulin - Scheduled/SSI or Insulin gtt    [] Anticoagulation (Heparin gtt or Coumadin - other anticoagulants in special circumstances)    [] Cardiac Medications (IV Amiodarone/Diltiazem, Tikosyn, etc)    [] Hemodialysis    [] Other -    [] Change in code status    [] Decision to escalate care    [] Major surgery/procedure with associated risk factors    --------------------------------------------------  C. Data (any 2)    [x] Data Review (any 3)    [x] Consultant notes from yesterday/today    [x] All available current labs reviewed interpreted for clinical significance    [x] Appropriate follow-up labs were ordered  [] Collateral history obtained     [] Independent Interpretation of tests (any 1)    [] Telemetry (Rhythm Strip) personally reviewed and interpreted        [] Imaging personally reviewed and interpreted     [x] Discussion (any 1)  [x] Multi-Disciplinary Rounds with Case Management  [] 
with Case Management completed on 11/14/2024 with the following recommendations:  Anticipated Discharge Location: []Home w/ []HHC vs []SNF  []Acute Rehab  []LTAC  []Hospice  [x]Other -  Possible return to [facility] unless he requires SNF; consider PT/OT pending course.   Anticipated Discharge Day/Date:  2  days  Barriers to Discharge: Clinical improvement, voiding trial.   --------------------------------------------------    MDM     A. Problems (any 1)  [x] Acute/Chronic Illness/injury posing ongoing threat to life and/or bodily function without ongoing treatment.   [] Severe exacerbation of chronic illness:    --------------------------------------------------  B. Risk of Treatment (any 1)   [] Drugs/treatments that require intensive monitoring for toxicity    [] IV ABX (Vancomycin, Aminoglycosides, etc)     [] Post-Cath/Contrast study requiring serial monitoring  [] IV Narcotic analgesia   [] Aggressive IV diuresis  [] Hypertonic Saline   [] Critical electrolyte abnormalities requiring IV replacement  [] Insulin - Scheduled/SSI or Insulin gtt  [] Anticoagulation (Heparin gtt or Coumadin - other anticoagulants in special circumstances)   [] Cardiac Medications (IV Amiodarone, Tikosyn, etc)  [] Hemodialysis  [] Other -  [] Change in code status:    [] Decision to escalate care:    [] Major surgery/procedure with associated risk factors:    --------------------------------------------------  C. Data (any 2)  [x] Data Review (any 3)  [x] Consultant notes from yesterday/today  [x] All available current labs reviewed interpreted for clinical significance   [x] Appropriate follow-up labs were ordered  [] Collateral history obtained:    [] Independent Interpretation of tests (any 1)  [] Telemetry (Rhythm Strip) personally reviewed and interpreted as documented above.      [] Imaging personally reviewed and interpreted, includes:    [x] Discussion (any 1)  [x] Multi-Disciplinary Rounds with Case Management  []

## 2024-11-19 NOTE — DISCHARGE SUMMARY
exposure control, iterative reconstruction, and/or weight based adjustment of the mA/kV was utilized to reduce the radiation dose to as low as reasonably achievable. COMPARISON: None. HISTORY: ORDERING SYSTEM PROVIDED HISTORY: hematuria TECHNOLOGIST PROVIDED HISTORY: Reason for exam:->hematuria Additional Contrast?->None Decision Support Exception - unselect if not a suspected or confirmed emergency medical condition->Emergency Medical Condition (MA) Reason for Exam: hematuria Relevant Medical/Surgical History: hx of prostate cancer. prostate removed. FINDINGS: Lower Chest:  Visualized portion of the lower chest demonstrates no acute abnormality. Organs:  Liver demonstrates no suspicious mass. There is no evidence of intrahepatic biliary ductal dilatation. The spleen, pancreas and adrenal glands are unremarkable. The kidneys demonstrate no evidence of hydronephrosis. GI/Bowel:  There is no evidence of bowel obstruction.  No evidence of abnormal bowel wall thickening or distension. Pelvis: There is irregular hyperdensity in the dependent part of urinary bladder likely represent a blood clot measures approximately 8.5 x5 x 8.5 cm. An underlying urinary bladder mass cannot be excluded.. Peritoneum/Retroperitoneum: No evidence of ascites or free air. Bones/Soft Tissues: Unremarkable     Irregular hyperdensity in the dependent part of the urinary bladder likely represent a blood clot measures approximately 8.5 x5 x 8.5 cm. An underlying urinary bladder mass cannot be excluded.       Consults:     IP CONSULT TO HOSPITALIST  IP CONSULT TO UROLOGY  PALLIATIVE CARE EVAL  IP CONSULT TO CASE MANAGEMENT  IP CONSULT TO NEUROLOGY  IP CONSULT TO HOME CARE NEEDS    Labs:     Recent Labs     11/17/24  0513 11/18/24  0532 11/19/24  0453   WBC 5.3 8.6 6.4   HGB 8.7* 8.5* 9.8*   HCT 25.1* 24.7* 28.2*    419 398     Recent Labs     11/17/24  0513 11/18/24  0532 11/19/24  0453    134* 138   K 3.8 3.9 4.0    99 100

## 2024-11-19 NOTE — CARE COORDINATION
CASE MANAGEMENT DISCHARGE SUMMARY    Discharge to: [facility], PT, OT    IMM given: (date) 11/18/2024    New Durable Medical Equipment ordered/agency: Leg bag sent with per facility request     Transportation: Medical Transport explained to pt/family. Pt/family voice no agency preference.    Agency used: [facility]    time:12pm   Ambulance form completed: Yes    Confirmed discharge plan with: Reza Forrest    Facility/Agency, name:  [facility] ROBERTO/AVS pulled by Jonah    Phone number for report to facility:[phone]     RN, name: SHAYLA Waggoner    Note: Discharging nurse to complete ROBERTO, reconcile AVS, and place final copy with patient's discharge packet. .BARNEY Ley

## 2024-11-19 NOTE — PLAN OF CARE
Problem: Safety - Adult  Goal: Free from fall injury  11/19/2024 0818 by Edilson Lorenzo, RN  Outcome: Adequate for Discharge  11/18/2024 2025 by Olga Mena RN  Outcome: Progressing     Problem: Skin/Tissue Integrity  Goal: Absence of new skin breakdown  Description: 1.  Monitor for areas of redness and/or skin breakdown  2.  Assess vascular access sites hourly  3.  Every 4-6 hours minimum:  Change oxygen saturation probe site  4.  Every 4-6 hours:  If on nasal continuous positive airway pressure, respiratory therapy assess nares and determine need for appliance change or resting period.  11/19/2024 0818 by Edilson Lorenzo, RN  Outcome: Adequate for Discharge  11/18/2024 2025 by Olga Mena RN  Outcome: Progressing     Problem: Discharge Planning  Goal: Discharge to home or other facility with appropriate resources  11/19/2024 0818 by Edilson Lorenzo, RN  Outcome: Adequate for Discharge  11/18/2024 2025 by Olga Mena RN  Outcome: Progressing     Problem: Pain  Goal: Verbalizes/displays adequate comfort level or baseline comfort level  11/19/2024 0818 by Edilson Lorenzo, RN  Outcome: Adequate for Discharge  11/18/2024 2025 by Olga Mena RN  Outcome: Progressing     Problem: ABCDS Injury Assessment  Goal: Absence of physical injury  Outcome: Adequate for Discharge

## 2024-11-19 NOTE — PLAN OF CARE
Problem: Safety - Adult  Goal: Free from fall injury  11/18/2024 2025 by Olga Mena RN  Outcome: Progressing  11/18/2024 0740 by Yadira Jacobsen RN  Outcome: Progressing  Flowsheets (Taken 11/18/2024 0740)  Free From Fall Injury:   Instruct family/caregiver on patient safety   Based on caregiver fall risk screen, instruct family/caregiver to ask for assistance with transferring infant if caregiver noted to have fall risk factors     Problem: Skin/Tissue Integrity  Goal: Absence of new skin breakdown  Description: 1.  Monitor for areas of redness and/or skin breakdown  2.  Assess vascular access sites hourly  3.  Every 4-6 hours minimum:  Change oxygen saturation probe site  4.  Every 4-6 hours:  If on nasal continuous positive airway pressure, respiratory therapy assess nares and determine need for appliance change or resting period.  Outcome: Progressing     Problem: Discharge Planning  Goal: Discharge to home or other facility with appropriate resources  11/18/2024 2025 by Olga Mena RN  Outcome: Progressing  11/18/2024 0740 by Yadira Jacobsen RN  Outcome: Progressing  Flowsheets (Taken 11/18/2024 0740)  Discharge to home or other facility with appropriate resources:   Identify barriers to discharge with patient and caregiver   Identify discharge learning needs (meds, wound care, etc)   Refer to discharge planning if patient needs post-hospital services based on physician order or complex needs related to functional status, cognitive ability or social support system   Arrange for interpreters to assist at discharge as needed   Arrange for needed discharge resources and transportation as appropriate     Problem: Pain  Goal: Verbalizes/displays adequate comfort level or baseline comfort level  11/18/2024 2025 by Olga Mena RN  Outcome: Progressing  11/18/2024 0740 by Yadira Jacobsen RN  Outcome: Progressing  Flowsheets (Taken 11/18/2024 0740)  Verbalizes/displays adequate comfort level

## 2024-11-24 ENCOUNTER — HOSPITAL ENCOUNTER (INPATIENT)
Age: 79
LOS: 3 days | Discharge: NURSING, REHAB OR LONG-TERM CARE | DRG: 689 | End: 2024-11-27
Attending: EMERGENCY MEDICINE
Payer: MEDICARE

## 2024-11-24 ENCOUNTER — APPOINTMENT (OUTPATIENT)
Dept: CT IMAGING | Age: 79
DRG: 689 | End: 2024-11-24
Payer: MEDICARE

## 2024-11-24 DIAGNOSIS — G93.40 ACUTE ENCEPHALOPATHY: Primary | ICD-10-CM

## 2024-11-24 DIAGNOSIS — N30.00 ACUTE CYSTITIS WITHOUT HEMATURIA: ICD-10-CM

## 2024-11-24 LAB
ALBUMIN SERPL-MCNC: 4 G/DL (ref 3.4–5)
ALBUMIN/GLOB SERPL: 1.6 {RATIO} (ref 1.1–2.2)
ALP SERPL-CCNC: 69 U/L (ref 40–129)
ALT SERPL-CCNC: 11 U/L (ref 10–40)
ANION GAP SERPL CALCULATED.3IONS-SCNC: 15 MMOL/L (ref 3–16)
AST SERPL-CCNC: 14 U/L (ref 15–37)
BACTERIA URNS QL MICRO: ABNORMAL /HPF
BASOPHILS # BLD: 0.1 K/UL (ref 0–0.2)
BASOPHILS NFR BLD: 0.4 %
BILIRUB SERPL-MCNC: 0.4 MG/DL (ref 0–1)
BILIRUB UR QL STRIP.AUTO: NEGATIVE
BUN SERPL-MCNC: 27 MG/DL (ref 7–20)
CALCIUM SERPL-MCNC: 9 MG/DL (ref 8.3–10.6)
CHLORIDE SERPL-SCNC: 103 MMOL/L (ref 99–110)
CLARITY UR: ABNORMAL
CO2 SERPL-SCNC: 20 MMOL/L (ref 21–32)
COLOR UR: YELLOW
CREAT SERPL-MCNC: 1.1 MG/DL (ref 0.8–1.3)
DEPRECATED RDW RBC AUTO: 14.2 % (ref 12.4–15.4)
EOSINOPHIL # BLD: 0 K/UL (ref 0–0.6)
EOSINOPHIL NFR BLD: 0.1 %
EPI CELLS #/AREA URNS HPF: ABNORMAL /HPF (ref 0–5)
FLUAV RNA RESP QL NAA+PROBE: NOT DETECTED
FLUBV RNA RESP QL NAA+PROBE: NOT DETECTED
GFR SERPLBLD CREATININE-BSD FMLA CKD-EPI: 68 ML/MIN/{1.73_M2}
GLUCOSE SERPL-MCNC: 91 MG/DL (ref 70–99)
GLUCOSE UR STRIP.AUTO-MCNC: NEGATIVE MG/DL
HCT VFR BLD AUTO: 27.1 % (ref 40.5–52.5)
HGB BLD-MCNC: 9.1 G/DL (ref 13.5–17.5)
HGB UR QL STRIP.AUTO: ABNORMAL
KETONES UR STRIP.AUTO-MCNC: ABNORMAL MG/DL
LACTATE BLDV-SCNC: 1.4 MMOL/L (ref 0.4–2)
LEUKOCYTE ESTERASE UR QL STRIP.AUTO: ABNORMAL
LYMPHOCYTES # BLD: 0.7 K/UL (ref 1–5.1)
LYMPHOCYTES NFR BLD: 5.7 %
MCH RBC QN AUTO: 31.4 PG (ref 26–34)
MCHC RBC AUTO-ENTMCNC: 33.4 G/DL (ref 31–36)
MCV RBC AUTO: 94 FL (ref 80–100)
MONOCYTES # BLD: 0.8 K/UL (ref 0–1.3)
MONOCYTES NFR BLD: 6.8 %
NEUTROPHILS # BLD: 10.9 K/UL (ref 1.7–7.7)
NEUTROPHILS NFR BLD: 87 %
NITRITE UR QL STRIP.AUTO: NEGATIVE
PH UR STRIP.AUTO: 5.5 [PH] (ref 5–8)
PLATELET # BLD AUTO: 494 K/UL (ref 135–450)
PMV BLD AUTO: 6.8 FL (ref 5–10.5)
POTASSIUM SERPL-SCNC: 3.9 MMOL/L (ref 3.5–5.1)
PROT SERPL-MCNC: 6.5 G/DL (ref 6.4–8.2)
PROT UR STRIP.AUTO-MCNC: 100 MG/DL
RBC # BLD AUTO: 2.88 M/UL (ref 4.2–5.9)
RBC #/AREA URNS HPF: >100 /HPF (ref 0–4)
SARS-COV-2 RNA RESP QL NAA+PROBE: NOT DETECTED
SODIUM SERPL-SCNC: 138 MMOL/L (ref 136–145)
SP GR UR STRIP.AUTO: >=1.03 (ref 1–1.03)
UA DIPSTICK W REFLEX MICRO PNL UR: YES
URN SPEC COLLECT METH UR: ABNORMAL
UROBILINOGEN UR STRIP-ACNC: 0.2 E.U./DL
WBC # BLD AUTO: 12.5 K/UL (ref 4–11)
WBC #/AREA URNS HPF: >100 /HPF (ref 0–5)

## 2024-11-24 PROCEDURE — 2580000003 HC RX 258: Performed by: EMERGENCY MEDICINE

## 2024-11-24 PROCEDURE — 96374 THER/PROPH/DIAG INJ IV PUSH: CPT

## 2024-11-24 PROCEDURE — 51702 INSERT TEMP BLADDER CATH: CPT

## 2024-11-24 PROCEDURE — 99285 EMERGENCY DEPT VISIT HI MDM: CPT

## 2024-11-24 PROCEDURE — 83605 ASSAY OF LACTIC ACID: CPT

## 2024-11-24 PROCEDURE — 87636 SARSCOV2 & INF A&B AMP PRB: CPT

## 2024-11-24 PROCEDURE — 1200000000 HC SEMI PRIVATE

## 2024-11-24 PROCEDURE — 87077 CULTURE AEROBIC IDENTIFY: CPT

## 2024-11-24 PROCEDURE — 87086 URINE CULTURE/COLONY COUNT: CPT

## 2024-11-24 PROCEDURE — 85025 COMPLETE CBC W/AUTO DIFF WBC: CPT

## 2024-11-24 PROCEDURE — 81001 URINALYSIS AUTO W/SCOPE: CPT

## 2024-11-24 PROCEDURE — 80053 COMPREHEN METABOLIC PANEL: CPT

## 2024-11-24 PROCEDURE — 70450 CT HEAD/BRAIN W/O DYE: CPT

## 2024-11-24 PROCEDURE — 6360000002 HC RX W HCPCS: Performed by: EMERGENCY MEDICINE

## 2024-11-24 PROCEDURE — 87186 SC STD MICRODIL/AGAR DIL: CPT

## 2024-11-24 RX ORDER — POTASSIUM CHLORIDE 1500 MG/1
40 TABLET, EXTENDED RELEASE ORAL PRN
Status: DISCONTINUED | OUTPATIENT
Start: 2024-11-24 | End: 2024-11-27 | Stop reason: HOSPADM

## 2024-11-24 RX ORDER — POTASSIUM CHLORIDE 7.45 MG/ML
10 INJECTION INTRAVENOUS PRN
Status: DISCONTINUED | OUTPATIENT
Start: 2024-11-24 | End: 2024-11-27 | Stop reason: HOSPADM

## 2024-11-24 RX ORDER — ONDANSETRON 2 MG/ML
4 INJECTION INTRAMUSCULAR; INTRAVENOUS EVERY 6 HOURS PRN
Status: DISCONTINUED | OUTPATIENT
Start: 2024-11-24 | End: 2024-11-27 | Stop reason: HOSPADM

## 2024-11-24 RX ORDER — RIVASTIGMINE TARTRATE 1.5 MG/1
6 CAPSULE ORAL 2 TIMES DAILY
Status: DISCONTINUED | OUTPATIENT
Start: 2024-11-24 | End: 2024-11-27 | Stop reason: HOSPADM

## 2024-11-24 RX ORDER — ACETAMINOPHEN 325 MG/1
650 TABLET ORAL EVERY 6 HOURS PRN
Status: DISCONTINUED | OUTPATIENT
Start: 2024-11-24 | End: 2024-11-27 | Stop reason: HOSPADM

## 2024-11-24 RX ORDER — HYDRALAZINE HYDROCHLORIDE 20 MG/ML
10 INJECTION INTRAMUSCULAR; INTRAVENOUS ONCE
Status: DISCONTINUED | OUTPATIENT
Start: 2024-11-24 | End: 2024-11-27 | Stop reason: HOSPADM

## 2024-11-24 RX ORDER — ENOXAPARIN SODIUM 100 MG/ML
40 INJECTION SUBCUTANEOUS DAILY
Status: DISCONTINUED | OUTPATIENT
Start: 2024-11-24 | End: 2024-11-27 | Stop reason: HOSPADM

## 2024-11-24 RX ORDER — ATORVASTATIN CALCIUM 40 MG/1
40 TABLET, FILM COATED ORAL NIGHTLY
Status: DISCONTINUED | OUTPATIENT
Start: 2024-11-24 | End: 2024-11-27 | Stop reason: HOSPADM

## 2024-11-24 RX ORDER — ACETAMINOPHEN 650 MG/1
650 SUPPOSITORY RECTAL EVERY 6 HOURS PRN
Status: DISCONTINUED | OUTPATIENT
Start: 2024-11-24 | End: 2024-11-27 | Stop reason: HOSPADM

## 2024-11-24 RX ORDER — MAGNESIUM SULFATE IN WATER 40 MG/ML
2000 INJECTION, SOLUTION INTRAVENOUS PRN
Status: DISCONTINUED | OUTPATIENT
Start: 2024-11-24 | End: 2024-11-27 | Stop reason: HOSPADM

## 2024-11-24 RX ORDER — SODIUM CHLORIDE 0.9 % (FLUSH) 0.9 %
5-40 SYRINGE (ML) INJECTION PRN
Status: DISCONTINUED | OUTPATIENT
Start: 2024-11-24 | End: 2024-11-27 | Stop reason: HOSPADM

## 2024-11-24 RX ORDER — AMLODIPINE BESYLATE 5 MG/1
10 TABLET ORAL DAILY
Status: DISCONTINUED | OUTPATIENT
Start: 2024-11-24 | End: 2024-11-27 | Stop reason: HOSPADM

## 2024-11-24 RX ORDER — 0.9 % SODIUM CHLORIDE 0.9 %
1000 INTRAVENOUS SOLUTION INTRAVENOUS ONCE
Status: COMPLETED | OUTPATIENT
Start: 2024-11-24 | End: 2024-11-24

## 2024-11-24 RX ORDER — ONDANSETRON 4 MG/1
4 TABLET, ORALLY DISINTEGRATING ORAL EVERY 8 HOURS PRN
Status: DISCONTINUED | OUTPATIENT
Start: 2024-11-24 | End: 2024-11-27 | Stop reason: HOSPADM

## 2024-11-24 RX ORDER — POLYETHYLENE GLYCOL 3350 17 G/17G
17 POWDER, FOR SOLUTION ORAL DAILY PRN
Status: DISCONTINUED | OUTPATIENT
Start: 2024-11-24 | End: 2024-11-27 | Stop reason: HOSPADM

## 2024-11-24 RX ORDER — LIDOCAINE HYDROCHLORIDE 20 MG/ML
JELLY TOPICAL PRN
Status: DISCONTINUED | OUTPATIENT
Start: 2024-11-24 | End: 2024-11-27 | Stop reason: HOSPADM

## 2024-11-24 RX ORDER — SODIUM CHLORIDE 9 MG/ML
INJECTION, SOLUTION INTRAVENOUS PRN
Status: DISCONTINUED | OUTPATIENT
Start: 2024-11-24 | End: 2024-11-27 | Stop reason: HOSPADM

## 2024-11-24 RX ORDER — SODIUM CHLORIDE 0.9 % (FLUSH) 0.9 %
5-40 SYRINGE (ML) INJECTION EVERY 12 HOURS SCHEDULED
Status: DISCONTINUED | OUTPATIENT
Start: 2024-11-24 | End: 2024-11-27 | Stop reason: HOSPADM

## 2024-11-24 RX ORDER — ASPIRIN 81 MG/1
81 TABLET ORAL DAILY
Status: DISCONTINUED | OUTPATIENT
Start: 2024-11-24 | End: 2024-11-27 | Stop reason: HOSPADM

## 2024-11-24 RX ADMIN — CEFTRIAXONE SODIUM 1000 MG: 1 INJECTION, POWDER, FOR SOLUTION INTRAMUSCULAR; INTRAVENOUS at 16:32

## 2024-11-24 RX ADMIN — SODIUM CHLORIDE 1000 ML: 9 INJECTION, SOLUTION INTRAVENOUS at 15:09

## 2024-11-24 NOTE — ED NOTES
Brent Mina is a 79 y.o. male admitted for  Principal Problem:    Encephalopathy  Resolved Problems:    * No resolved hospital problems. *  .   Patient SNF LTC via EMS transportation with   Chief Complaint   Patient presents with    Altered Mental Status     Pt arrived to ed via ems with c/o \"not acting right\" and being combative.    .  Patient is alert and Person  Patient's baseline mobility: Baseline Mobility: reports ambulatory, has not been ambulated in ED  Code Status: Full Code   Cardiac Rhythm:       Is patient on baseline Oxygen: no how many Liters:   Abnormal Assessment Findings: acute encephalopathy, acute cystitis without hematuria    Isolation: None      NIH Score:    C-SSRS: Risk of Suicide: No Risk  Bedside swallow:        Active LDA's:   Peripheral IV 11/24/24 Right Forearm (Active)   Site Assessment Clean, dry & intact 11/24/24 1357   Line Status Blood return noted;Specimen collected;Flushed 11/24/24 1357     Patient admitted with a cho: yes,  If the cho is chronic was it exchanged:Yes  Reason for cho: Chronic   Patient admitted with Central Line:  NA . PICC line placement confirmed: YES OR NO:[id number]}   Reason for Central line:   Was central line Inserted from an outside facility:        Family/Caregiver Present no Any Concerns: no   Restraints no  Sitter no         Vitals:      Vitals:    11/24/24 1415 11/24/24 1443 11/24/24 1731 11/24/24 1745   BP: 120/64 124/69 132/72 135/68   Pulse: 71 80 75 78   Resp: 19 20 20 22   Temp:       TempSrc:       SpO2: 100% 100% 100% 99%   Weight:       Height:           Last documented pain score (0-10 scale)    Pain medication administered No.    Pertinent or High Risk Medications/Drips: No.    Pending Blood Product Administration: no    Abnormal labs:   Abnormal Labs Reviewed   CBC WITH AUTO DIFFERENTIAL - Abnormal; Notable for the following components:       Result Value    WBC 12.5 (*)     RBC 2.88 (*)     Hemoglobin 9.1 (*)     Hematocrit 27.1 (*)

## 2024-11-24 NOTE — ED PROVIDER NOTES
Jose Che MD at [facility] OR    INTRACAPSULAR CATARACT EXTRACTION Left 10/31/2019    PHACOEMULSIFICATION OF CATARACT LEFT EYE WITH INTRAOCULAR LENS IMPLANT performed by Jose Che MD at [facility] OR    PROSTATECTOMY        CURRENTMEDICATIONS       Previous Medications    ACETAMINOPHEN (TYLENOL) 325 MG TABLET    Take 2 tablets by mouth in the morning and 2 tablets at noon and 2 tablets in the evening and 2 tablets before bedtime.    AMLODIPINE (NORVASC) 10 MG TABLET    Take 1 tablet by mouth daily    ASCORBIC ACID (VITAMIN C) 250 MG TABLET    Take 2 tablets by mouth daily    ASPIRIN 81 MG TABLET    Take 1 tablet by mouth daily    ATORVASTATIN (LIPITOR) 40 MG TABLET    daily     FLUOXETINE (PROZAC) 20 MG CAPSULE    Take 3 capsules by mouth daily    LIDOCAINE (XYLOCAINE) 2 % URO-JET    Apply topically as needed for Pain    MISC NATURAL PRODUCTS (OSTEO BI-FLEX JOINT SHIELD PO)    Take  by mouth.      MULTIVITAMIN (THERAGRAN) PER TABLET    Take 1 tablet by mouth daily    OMEGA-3 FATTY ACIDS (FISH OIL) 1000 MG CAPS    Take 2 capsules by mouth 2 times daily    POLYETHYLENE GLYCOL (GLYCOLAX) 17 G PACKET    Take 1 packet by mouth daily as needed for Constipation    RIVASTIGMINE (EXELON) 6 MG CAPSULE    Take 1 capsule by mouth 2 times daily      SCREENINGS          [place] Coma Scale  Eye Opening: Spontaneous  Best Verbal Response: Confused  Best Motor Response: Obeys commands  Eh Coma Scale Score: 14                CIWA Assessment  BP: 124/69  Pulse: 80                  PHYSICAL EXAM :  ED Triage Vitals   BP Systolic BP Percentile Diastolic BP Percentile Temp Temp Source Pulse Respirations SpO2   11/24/24 1311 -- -- 11/24/24 1308 11/24/24 1308 11/24/24 1308 11/24/24 1308 11/24/24 1308   123/70   97.9 °F (36.6 °C) Oral 80 22 100 %      Height Weight - Scale         11/24/24 1311 11/24/24 1311         1.829 m (6') 63.5 kg (140 lb)            GENERAL APPEARANCE: Awake and alert. Cooperative. No acute distress.  HEAD:

## 2024-11-24 NOTE — ED NOTES
Patient cleaned and changed into new, dry brief. Pt noted to have large amount of dry, hardened stool covering coccyx with cho catheter tubing unclipped from stat lock, going in between legs, through stool and out back side of brief. Michelle care provided, catheter positioned correctly and hanging on side of bed. Pt repositioned, covered with warm blanket.

## 2024-11-25 ENCOUNTER — APPOINTMENT (OUTPATIENT)
Dept: CT IMAGING | Age: 79
DRG: 689 | End: 2024-11-25
Payer: MEDICARE

## 2024-11-25 PROBLEM — E44.0 MODERATE PROTEIN-CALORIE MALNUTRITION (HCC): Status: ACTIVE | Noted: 2024-11-25

## 2024-11-25 LAB
ANION GAP SERPL CALCULATED.3IONS-SCNC: 13 MMOL/L (ref 3–16)
BASOPHILS # BLD: 0.1 K/UL (ref 0–0.2)
BASOPHILS NFR BLD: 0.9 %
BUN SERPL-MCNC: 23 MG/DL (ref 7–20)
CALCIUM SERPL-MCNC: 8.5 MG/DL (ref 8.3–10.6)
CHLORIDE SERPL-SCNC: 105 MMOL/L (ref 99–110)
CO2 SERPL-SCNC: 20 MMOL/L (ref 21–32)
CREAT SERPL-MCNC: 0.8 MG/DL (ref 0.8–1.3)
DEPRECATED RDW RBC AUTO: 14.3 % (ref 12.4–15.4)
EOSINOPHIL # BLD: 0 K/UL (ref 0–0.6)
EOSINOPHIL NFR BLD: 0.5 %
GFR SERPLBLD CREATININE-BSD FMLA CKD-EPI: 90 ML/MIN/{1.73_M2}
GLUCOSE SERPL-MCNC: 83 MG/DL (ref 70–99)
HCT VFR BLD AUTO: 25.3 % (ref 40.5–52.5)
HGB BLD-MCNC: 8.8 G/DL (ref 13.5–17.5)
LYMPHOCYTES # BLD: 0.6 K/UL (ref 1–5.1)
LYMPHOCYTES NFR BLD: 8 %
MCH RBC QN AUTO: 32.4 PG (ref 26–34)
MCHC RBC AUTO-ENTMCNC: 34.6 G/DL (ref 31–36)
MCV RBC AUTO: 93.8 FL (ref 80–100)
MONOCYTES # BLD: 0.5 K/UL (ref 0–1.3)
MONOCYTES NFR BLD: 5.8 %
NEUTROPHILS # BLD: 6.7 K/UL (ref 1.7–7.7)
NEUTROPHILS NFR BLD: 84.8 %
PLATELET # BLD AUTO: 427 K/UL (ref 135–450)
PMV BLD AUTO: 6.7 FL (ref 5–10.5)
POTASSIUM SERPL-SCNC: 3.8 MMOL/L (ref 3.5–5.1)
RBC # BLD AUTO: 2.7 M/UL (ref 4.2–5.9)
SODIUM SERPL-SCNC: 138 MMOL/L (ref 136–145)
WBC # BLD AUTO: 7.9 K/UL (ref 4–11)

## 2024-11-25 PROCEDURE — 1200000000 HC SEMI PRIVATE

## 2024-11-25 PROCEDURE — 6370000000 HC RX 637 (ALT 250 FOR IP)

## 2024-11-25 PROCEDURE — 36415 COLL VENOUS BLD VENIPUNCTURE: CPT

## 2024-11-25 PROCEDURE — 6360000004 HC RX CONTRAST MEDICATION: Performed by: INTERNAL MEDICINE

## 2024-11-25 PROCEDURE — 6360000002 HC RX W HCPCS

## 2024-11-25 PROCEDURE — 80048 BASIC METABOLIC PNL TOTAL CA: CPT

## 2024-11-25 PROCEDURE — 74177 CT ABD & PELVIS W/CONTRAST: CPT

## 2024-11-25 PROCEDURE — 2580000003 HC RX 258

## 2024-11-25 PROCEDURE — 85025 COMPLETE CBC W/AUTO DIFF WBC: CPT

## 2024-11-25 RX ORDER — IOPAMIDOL 755 MG/ML
75 INJECTION, SOLUTION INTRAVASCULAR
Status: COMPLETED | OUTPATIENT
Start: 2024-11-25 | End: 2024-11-25

## 2024-11-25 RX ADMIN — FLUOXETINE HYDROCHLORIDE 60 MG: 20 CAPSULE ORAL at 10:02

## 2024-11-25 RX ADMIN — ATORVASTATIN CALCIUM 40 MG: 40 TABLET, FILM COATED ORAL at 21:13

## 2024-11-25 RX ADMIN — RIVASTIGMINE TARTRATE 6 MG: 1.5 CAPSULE ORAL at 10:02

## 2024-11-25 RX ADMIN — ASPIRIN 81 MG: 81 TABLET, COATED ORAL at 10:02

## 2024-11-25 RX ADMIN — IOPAMIDOL 75 ML: 755 INJECTION, SOLUTION INTRAVENOUS at 21:53

## 2024-11-25 RX ADMIN — ENOXAPARIN SODIUM 40 MG: 100 INJECTION SUBCUTANEOUS at 10:02

## 2024-11-25 RX ADMIN — AMLODIPINE BESYLATE 10 MG: 5 TABLET ORAL at 10:02

## 2024-11-25 RX ADMIN — CEFTRIAXONE SODIUM 1000 MG: 1 INJECTION, POWDER, FOR SOLUTION INTRAMUSCULAR; INTRAVENOUS at 10:14

## 2024-11-25 RX ADMIN — RIVASTIGMINE TARTRATE 6 MG: 1.5 CAPSULE ORAL at 21:12

## 2024-11-25 NOTE — PLAN OF CARE
Problem: Skin/Tissue Integrity  Goal: Absence of new skin breakdown  Description: 1.  Monitor for areas of redness and/or skin breakdown  2.  Assess vascular access sites hourly  3.  Every 4-6 hours minimum:  Change oxygen saturation probe site  4.  Every 4-6 hours:  If on nasal continuous positive airway pressure, respiratory therapy assess nares and determine need for appliance change or resting period.  Outcome: Progressing     Problem: Safety - Adult  Goal: Free from fall injury  Outcome: Progressing  Flowsheets (Taken 11/25/2024 8781)  Free From Fall Injury:   Instruct family/caregiver on patient safety   Based on caregiver fall risk screen, instruct family/caregiver to ask for assistance with transferring infant if caregiver noted to have fall risk factors

## 2024-11-25 NOTE — ACP (ADVANCE CARE PLANNING)
I was called by the patients nurse.  He does have paperwork stating he is DNR.  I will change code status to DNR CCA

## 2024-11-25 NOTE — H&P
weight based adjustment of the mA/kV was utilized to reduce the radiation dose to as low as reasonably achievable. COMPARISON: None. HISTORY: ORDERING SYSTEM PROVIDED HISTORY: hematuria TECHNOLOGIST PROVIDED HISTORY: Reason for exam:->hematuria Additional Contrast?->None Decision Support Exception - unselect if not a suspected or confirmed emergency medical condition->Emergency Medical Condition (MA) Reason for Exam: hematuria Relevant Medical/Surgical History: hx of prostate cancer. prostate removed. FINDINGS: Lower Chest:  Visualized portion of the lower chest demonstrates no acute abnormality. Organs:  Liver demonstrates no suspicious mass. There is no evidence of intrahepatic biliary ductal dilatation. The spleen, pancreas and adrenal glands are unremarkable. The kidneys demonstrate no evidence of hydronephrosis. GI/Bowel:  There is no evidence of bowel obstruction.  No evidence of abnormal bowel wall thickening or distension. Pelvis: There is irregular hyperdensity in the dependent part of urinary bladder likely represent a blood clot measures approximately 8.5 x5 x 8.5 cm. An underlying urinary bladder mass cannot be excluded.. Peritoneum/Retroperitoneum: No evidence of ascites or free air. Bones/Soft Tissues: Unremarkable     Irregular hyperdensity in the dependent part of the urinary bladder likely represent a blood clot measures approximately 8.5 x5 x 8.5 cm. An underlying urinary bladder mass cannot be excluded.       PCP: Aravind Reed MD    Past Medical History:        Diagnosis Date    ADHD (attention deficit hyperactivity disorder)     Alzheimer disease (HCC)     Hyperlipidemia     Hypertension     IFG (impaired fasting glucose)     OCD (obsessive compulsive disorder)     Osteoarthritis     Prostate cancer (HCC)     Renal insufficiency     TIA (transient ischemic attack)        Past Surgical History:        Procedure Laterality Date    COLONOSCOPY  2004    CYSTOSCOPY N/A 11/13/2024    CYSTOSCOPY

## 2024-11-25 NOTE — DISCHARGE INSTR - COC
Continuity of Care Form    Patient Name: Brent Mina   :  1945  MRN:  [id number]    Admit date:  2024  Discharge date:      Code Status Order: DNR-CCA   Advance Directives:   Advance Care Flowsheet Documentation             Admitting Physician:  Tarun Jin DO  PCP: Aravind Reed MD    Discharging Nurse: Guillermina MCGUIRE  Discharging Hospital Unit/Room#: 0343/0343-01  Discharging Unit Phone Number: [phone]    Emergency Contact:   Extended Emergency Contact Information  Primary Emergency Contact: Latanya Mina  Address: [address] [place], [place] [id number] [facility]  Home Phone: [phone]  Mobile Phone: [phone]  Relation: Spouse  Secondary Emergency Contact: Drew Mina   [facility]  Home Phone: [phone]  Mobile Phone: [phone]  Relation: Child    Past Surgical History:  Past Surgical History:   Procedure Laterality Date    COLONOSCOPY      CYSTOSCOPY N/A 2024    CYSTOSCOPY EVACUATION OF CLOTS performed by Paz Huang MD at [facility] OR    EYE SURGERY      FEMUR FRACTURE SURGERY Left 2023    LEFT HIP GAMMA NAILING WITH CABLES                  GUERITA performed by Niko Higgins MD at [facility] OR    INTRACAPSULAR CATARACT EXTRACTION Right 10/21/2019    PHACOEMULSIFICATION OF CATARACT RIGHT EYE WITH INTRAOCULAR LENS IMPLANT performed by Jose Che MD at [facility] OR    INTRACAPSULAR CATARACT EXTRACTION Left 10/31/2019    PHACOEMULSIFICATION OF CATARACT LEFT EYE WITH INTRAOCULAR LENS IMPLANT performed by Jose Che MD at [facility] OR    PROSTATECTOMY         Immunization History:   Immunization History   Administered Date(s) Administered    COVID-19, MODERNA BLUE border, Primary or Immunocompromised, (age 12y+), IM, 100 mcg/0.5mL 2021    Influenza 2012    Influenza Virus Vaccine 2013    Influenza, FLUZONE High Dose, (age 65 y+), IM, Trivalent PF, 0.5mL 2015    Pneumococcal Conjugate 7-valent

## 2024-11-25 NOTE — ACP (ADVANCE CARE PLANNING)
Advance Care Planning     General Advance Care Planning (ACP) Conversation    Date of Conversation: 11/25/2024  Conducted with: Patient with Decision Making Capacity and Legal next of kin  Other persons present: Spouse Latanya Mina    Healthcare Decision Maker:   Primary Decision Maker: Latanya Mina M - Spouse - [phone]    Secondary Decision Maker: KeeleyDrew - Child - [phone]       Content/Action Overview:  Has ACP document(s) on file - reflects the patient's care preferences  Reviewed DNR/DNI and patient confirms current DNR status - completed forms on file (place new order if needed)   4No's    Length of Voluntary ACP Conversation in minutes:  <16 minutes (Non-Billable)    Fatimah Mayen RN

## 2024-11-26 LAB
ANION GAP SERPL CALCULATED.3IONS-SCNC: 11 MMOL/L (ref 3–16)
BASOPHILS # BLD: 0.1 K/UL (ref 0–0.2)
BASOPHILS NFR BLD: 0.8 %
BUN SERPL-MCNC: 23 MG/DL (ref 7–20)
CALCIUM SERPL-MCNC: 8.6 MG/DL (ref 8.3–10.6)
CHLORIDE SERPL-SCNC: 105 MMOL/L (ref 99–110)
CO2 SERPL-SCNC: 22 MMOL/L (ref 21–32)
CREAT SERPL-MCNC: 0.7 MG/DL (ref 0.8–1.3)
DEPRECATED RDW RBC AUTO: 13.8 % (ref 12.4–15.4)
EOSINOPHIL # BLD: 0.1 K/UL (ref 0–0.6)
EOSINOPHIL NFR BLD: 1.1 %
FERRITIN SERPL IA-MCNC: 116 NG/ML (ref 30–400)
FOLATE SERPL-MCNC: 12 NG/ML (ref 4.78–24.2)
GFR SERPLBLD CREATININE-BSD FMLA CKD-EPI: >90 ML/MIN/{1.73_M2}
GLUCOSE SERPL-MCNC: 84 MG/DL (ref 70–99)
HCT VFR BLD AUTO: 24.2 % (ref 40.5–52.5)
HGB BLD-MCNC: 8.3 G/DL (ref 13.5–17.5)
IRON SATN MFR SERPL: 14 % (ref 20–50)
IRON SERPL-MCNC: 32 UG/DL (ref 59–158)
LYMPHOCYTES # BLD: 0.6 K/UL (ref 1–5.1)
LYMPHOCYTES NFR BLD: 9.7 %
MCH RBC QN AUTO: 31.8 PG (ref 26–34)
MCHC RBC AUTO-ENTMCNC: 34.3 G/DL (ref 31–36)
MCV RBC AUTO: 92.6 FL (ref 80–100)
MONOCYTES # BLD: 0.5 K/UL (ref 0–1.3)
MONOCYTES NFR BLD: 7.8 %
NEUTROPHILS # BLD: 5.1 K/UL (ref 1.7–7.7)
NEUTROPHILS NFR BLD: 80.6 %
PLATELET # BLD AUTO: 395 K/UL (ref 135–450)
PMV BLD AUTO: 7.1 FL (ref 5–10.5)
POTASSIUM SERPL-SCNC: 3.7 MMOL/L (ref 3.5–5.1)
RBC # BLD AUTO: 2.61 M/UL (ref 4.2–5.9)
SODIUM SERPL-SCNC: 138 MMOL/L (ref 136–145)
TIBC SERPL-MCNC: 222 UG/DL (ref 260–445)
VIT B12 SERPL-MCNC: 409 PG/ML (ref 211–911)
WBC # BLD AUTO: 6.3 K/UL (ref 4–11)

## 2024-11-26 PROCEDURE — 82607 VITAMIN B-12: CPT

## 2024-11-26 PROCEDURE — 83540 ASSAY OF IRON: CPT

## 2024-11-26 PROCEDURE — 85025 COMPLETE CBC W/AUTO DIFF WBC: CPT

## 2024-11-26 PROCEDURE — 6360000002 HC RX W HCPCS

## 2024-11-26 PROCEDURE — 6370000000 HC RX 637 (ALT 250 FOR IP)

## 2024-11-26 PROCEDURE — 97530 THERAPEUTIC ACTIVITIES: CPT

## 2024-11-26 PROCEDURE — 97162 PT EVAL MOD COMPLEX 30 MIN: CPT

## 2024-11-26 PROCEDURE — 97166 OT EVAL MOD COMPLEX 45 MIN: CPT

## 2024-11-26 PROCEDURE — 82746 ASSAY OF FOLIC ACID SERUM: CPT

## 2024-11-26 PROCEDURE — 1200000000 HC SEMI PRIVATE

## 2024-11-26 PROCEDURE — 83550 IRON BINDING TEST: CPT

## 2024-11-26 PROCEDURE — 2580000003 HC RX 258

## 2024-11-26 PROCEDURE — 6370000000 HC RX 637 (ALT 250 FOR IP): Performed by: NURSE PRACTITIONER

## 2024-11-26 PROCEDURE — 80048 BASIC METABOLIC PNL TOTAL CA: CPT

## 2024-11-26 PROCEDURE — 82728 ASSAY OF FERRITIN: CPT

## 2024-11-26 PROCEDURE — 36415 COLL VENOUS BLD VENIPUNCTURE: CPT

## 2024-11-26 RX ORDER — DIVALPROEX SODIUM 250 MG/1
250 TABLET, FILM COATED, EXTENDED RELEASE ORAL NIGHTLY
Status: DISCONTINUED | OUTPATIENT
Start: 2024-11-26 | End: 2024-11-27 | Stop reason: HOSPADM

## 2024-11-26 RX ORDER — DIVALPROEX SODIUM 250 MG/1
250 TABLET, FILM COATED, EXTENDED RELEASE ORAL NIGHTLY
COMMUNITY

## 2024-11-26 RX ADMIN — RIVASTIGMINE TARTRATE 6 MG: 1.5 CAPSULE ORAL at 11:36

## 2024-11-26 RX ADMIN — CEFTRIAXONE SODIUM 1000 MG: 1 INJECTION, POWDER, FOR SOLUTION INTRAMUSCULAR; INTRAVENOUS at 12:09

## 2024-11-26 RX ADMIN — FLUOXETINE HYDROCHLORIDE 60 MG: 20 CAPSULE ORAL at 11:36

## 2024-11-26 RX ADMIN — AMLODIPINE BESYLATE 10 MG: 5 TABLET ORAL at 11:36

## 2024-11-26 RX ADMIN — ASPIRIN 81 MG: 81 TABLET, COATED ORAL at 11:36

## 2024-11-26 RX ADMIN — ATORVASTATIN CALCIUM 40 MG: 40 TABLET, FILM COATED ORAL at 19:30

## 2024-11-26 RX ADMIN — RIVASTIGMINE TARTRATE 6 MG: 1.5 CAPSULE ORAL at 19:34

## 2024-11-26 RX ADMIN — ACETAMINOPHEN 650 MG: 325 TABLET ORAL at 11:46

## 2024-11-26 RX ADMIN — DIVALPROEX SODIUM 250 MG: 250 TABLET, EXTENDED RELEASE ORAL at 19:30

## 2024-11-26 RX ADMIN — SODIUM CHLORIDE, PRESERVATIVE FREE 10 ML: 5 INJECTION INTRAVENOUS at 19:34

## 2024-11-26 RX ADMIN — SODIUM CHLORIDE, PRESERVATIVE FREE 10 ML: 5 INJECTION INTRAVENOUS at 16:03

## 2024-11-26 ASSESSMENT — PAIN DESCRIPTION - LOCATION: LOCATION: HEAD

## 2024-11-26 ASSESSMENT — PAIN SCALES - GENERAL: PAINLEVEL_OUTOF10: 0

## 2024-11-26 NOTE — PLAN OF CARE
Problem: Risk for Elopement  Goal: Patient will not exit the unit/facility without proper excort  Outcome: Progressing  Flowsheets (Taken 11/26/2024 0840)  Nursing Interventions for Elopement Risk:   Assist with personal care needs such as toileting, eating, dressing, as needed to reduce the risk of wandering   Collaborate with family members/caregivers to mitigate the elopement risk   Make sure patient has all necessary personal care items   Reduce environmental triggers   Shoes and clothing collected and placed in gown attire     Problem: Skin/Tissue Integrity  Goal: Absence of new skin breakdown  Description: 1.  Monitor for areas of redness and/or skin breakdown  2.  Assess vascular access sites hourly  3.  Every 4-6 hours minimum:  Change oxygen saturation probe site  4.  Every 4-6 hours:  If on nasal continuous positive airway pressure, respiratory therapy assess nares and determine need for appliance change or resting period.  Outcome: Progressing     Problem: Safety - Adult  Goal: Free from fall injury  Outcome: Progressing  Flowsheets (Taken 11/26/2024 0840)  Free From Fall Injury:   Instruct family/caregiver on patient safety   Based on caregiver fall risk screen, instruct family/caregiver to ask for assistance with transferring infant if caregiver noted to have fall risk factors     Problem: ABCDS Injury Assessment  Goal: Absence of physical injury  Outcome: Progressing  Flowsheets (Taken 11/26/2024 0840)  Absence of Physical Injury: Implement safety measures based on patient assessment     Problem: Nutrition Deficit:  Goal: Optimize nutritional status  Outcome: Progressing  Flowsheets (Taken 11/26/2024 0840)  Nutrient intake appropriate for improving, restoring, or maintaining nutritional needs:   Assess nutritional status and recommend course of action   Monitor oral intake, labs, and treatment plans

## 2024-11-27 VITALS
HEIGHT: 72 IN | SYSTOLIC BLOOD PRESSURE: 120 MMHG | DIASTOLIC BLOOD PRESSURE: 61 MMHG | OXYGEN SATURATION: 100 % | BODY MASS INDEX: 18.96 KG/M2 | HEART RATE: 68 BPM | TEMPERATURE: 98 F | RESPIRATION RATE: 18 BRPM | WEIGHT: 140 LBS

## 2024-11-27 LAB
ANION GAP SERPL CALCULATED.3IONS-SCNC: 11 MMOL/L (ref 3–16)
BACTERIA UR CULT: ABNORMAL
BASOPHILS # BLD: 0 K/UL (ref 0–0.2)
BASOPHILS NFR BLD: 0.8 %
BUN SERPL-MCNC: 22 MG/DL (ref 7–20)
CALCIUM SERPL-MCNC: 8.7 MG/DL (ref 8.3–10.6)
CHLORIDE SERPL-SCNC: 103 MMOL/L (ref 99–110)
CHOLEST SERPL-MCNC: 137 MG/DL (ref 0–199)
CO2 SERPL-SCNC: 23 MMOL/L (ref 21–32)
CREAT SERPL-MCNC: 0.7 MG/DL (ref 0.8–1.3)
DEPRECATED RDW RBC AUTO: 13.8 % (ref 12.4–15.4)
EOSINOPHIL # BLD: 0.1 K/UL (ref 0–0.6)
EOSINOPHIL NFR BLD: 1.9 %
GFR SERPLBLD CREATININE-BSD FMLA CKD-EPI: >90 ML/MIN/{1.73_M2}
GLUCOSE SERPL-MCNC: 87 MG/DL (ref 70–99)
HCT VFR BLD AUTO: 26.4 % (ref 40.5–52.5)
HDLC SERPL-MCNC: 72 MG/DL (ref 40–60)
HGB BLD-MCNC: 8.9 G/DL (ref 13.5–17.5)
LDLC SERPL CALC-MCNC: 57 MG/DL
LYMPHOCYTES # BLD: 0.6 K/UL (ref 1–5.1)
LYMPHOCYTES NFR BLD: 9.6 %
MCH RBC QN AUTO: 31.5 PG (ref 26–34)
MCHC RBC AUTO-ENTMCNC: 33.8 G/DL (ref 31–36)
MCV RBC AUTO: 93.3 FL (ref 80–100)
MONOCYTES # BLD: 0.6 K/UL (ref 0–1.3)
MONOCYTES NFR BLD: 9.3 %
NEUTROPHILS # BLD: 4.7 K/UL (ref 1.7–7.7)
NEUTROPHILS NFR BLD: 78.4 %
ORGANISM: ABNORMAL
PLATELET # BLD AUTO: 374 K/UL (ref 135–450)
PMV BLD AUTO: 7 FL (ref 5–10.5)
POTASSIUM SERPL-SCNC: 3.8 MMOL/L (ref 3.5–5.1)
RBC # BLD AUTO: 2.83 M/UL (ref 4.2–5.9)
SODIUM SERPL-SCNC: 137 MMOL/L (ref 136–145)
TRIGL SERPL-MCNC: 40 MG/DL (ref 0–150)
VLDLC SERPL CALC-MCNC: 8 MG/DL
WBC # BLD AUTO: 6 K/UL (ref 4–11)

## 2024-11-27 PROCEDURE — 2580000003 HC RX 258: Performed by: NURSE PRACTITIONER

## 2024-11-27 PROCEDURE — 2580000003 HC RX 258

## 2024-11-27 PROCEDURE — 6370000000 HC RX 637 (ALT 250 FOR IP)

## 2024-11-27 PROCEDURE — 6360000002 HC RX W HCPCS: Performed by: NURSE PRACTITIONER

## 2024-11-27 PROCEDURE — 80061 LIPID PANEL: CPT

## 2024-11-27 PROCEDURE — 80048 BASIC METABOLIC PNL TOTAL CA: CPT

## 2024-11-27 PROCEDURE — 36415 COLL VENOUS BLD VENIPUNCTURE: CPT

## 2024-11-27 PROCEDURE — 85025 COMPLETE CBC W/AUTO DIFF WBC: CPT

## 2024-11-27 PROCEDURE — 6370000000 HC RX 637 (ALT 250 FOR IP): Performed by: NURSE PRACTITIONER

## 2024-11-27 RX ORDER — NITROFURANTOIN MACROCRYSTALS 50 MG/1
50 CAPSULE ORAL EVERY 6 HOURS SCHEDULED
Status: DISCONTINUED | OUTPATIENT
Start: 2024-11-27 | End: 2024-11-27 | Stop reason: RX

## 2024-11-27 RX ORDER — NITROFURANTOIN 25; 75 MG/1; MG/1
100 CAPSULE ORAL 2 TIMES DAILY
DISCHARGE
Start: 2024-11-27 | End: 2024-11-30

## 2024-11-27 RX ORDER — FERROUS SULFATE 325(65) MG
325 TABLET ORAL
DISCHARGE
Start: 2024-11-27 | End: 2025-01-26

## 2024-11-27 RX ORDER — NITROFURANTOIN 25; 75 MG/1; MG/1
100 CAPSULE ORAL 2 TIMES DAILY
Status: DISCONTINUED | OUTPATIENT
Start: 2024-11-27 | End: 2024-11-27 | Stop reason: HOSPADM

## 2024-11-27 RX ORDER — NITROFURANTOIN MACROCRYSTALS 50 MG/1
50 CAPSULE ORAL 4 TIMES DAILY
DISCHARGE
Start: 2024-11-27 | End: 2024-11-27 | Stop reason: HOSPADM

## 2024-11-27 RX ADMIN — NITROFURANTOIN MONOHYDRATE/MACROCRYSTALS 100 MG: 75; 25 CAPSULE ORAL at 14:15

## 2024-11-27 RX ADMIN — CEFTRIAXONE SODIUM 1000 MG: 1 INJECTION, POWDER, FOR SOLUTION INTRAMUSCULAR; INTRAVENOUS at 10:18

## 2024-11-27 RX ADMIN — ASPIRIN 81 MG: 81 TABLET, COATED ORAL at 10:19

## 2024-11-27 RX ADMIN — FLUOXETINE HYDROCHLORIDE 60 MG: 20 CAPSULE ORAL at 10:19

## 2024-11-27 RX ADMIN — RIVASTIGMINE TARTRATE 6 MG: 1.5 CAPSULE ORAL at 10:21

## 2024-11-27 RX ADMIN — SODIUM CHLORIDE, PRESERVATIVE FREE 10 ML: 5 INJECTION INTRAVENOUS at 10:20

## 2024-11-27 RX ADMIN — IRON SUCROSE 200 MG: 20 INJECTION, SOLUTION INTRAVENOUS at 10:31

## 2024-11-27 RX ADMIN — AMLODIPINE BESYLATE 10 MG: 5 TABLET ORAL at 10:19

## 2024-11-27 NOTE — DISCHARGE SUMMARY
11/10/2024  EXAMINATION: CT OF THE ABDOMEN AND PELVIS WITH CONTRAST 11/9/2024 11:13 pm TECHNIQUE: CT of the abdomen and pelvis was performed with the administration of intravenous contrast. Multiplanar reformatted images are provided for review. Automated exposure control, iterative reconstruction, and/or weight based adjustment of the mA/kV was utilized to reduce the radiation dose to as low as reasonably achievable. COMPARISON: None. HISTORY: ORDERING SYSTEM PROVIDED HISTORY: hematuria TECHNOLOGIST PROVIDED HISTORY: Reason for exam:->hematuria Additional Contrast?->None Decision Support Exception - unselect if not a suspected or confirmed emergency medical condition->Emergency Medical Condition (MA) Reason for Exam: hematuria Relevant Medical/Surgical History: hx of prostate cancer. prostate removed. FINDINGS: Lower Chest:  Visualized portion of the lower chest demonstrates no acute abnormality. Organs:  Liver demonstrates no suspicious mass. There is no evidence of intrahepatic biliary ductal dilatation. The spleen, pancreas and adrenal glands are unremarkable. The kidneys demonstrate no evidence of hydronephrosis. GI/Bowel:  There is no evidence of bowel obstruction.  No evidence of abnormal bowel wall thickening or distension. Pelvis: There is irregular hyperdensity in the dependent part of urinary bladder likely represent a blood clot measures approximately 8.5 x5 x 8.5 cm. An underlying urinary bladder mass cannot be excluded.. Peritoneum/Retroperitoneum: No evidence of ascites or free air. Bones/Soft Tissues: Unremarkable     Irregular hyperdensity in the dependent part of the urinary bladder likely represent a blood clot measures approximately 8.5 x5 x 8.5 cm. An underlying urinary bladder mass cannot be excluded.       Consults:     IP CONSULT TO UROLOGY  IP CONSULT TO HOME CARE NEEDS    Labs:     Recent Labs     11/25/24  0641 11/26/24  0514 11/27/24  0522   WBC 7.9 6.3 6.0   HGB 8.8* 8.3* 8.9*   HCT

## 2024-11-27 NOTE — PROGRESS NOTES
4 Eyes Skin Assessment and Patient belongings     The patient is being assess for  Eleuterio 15    I agree that 2 Nurses have performed a thorough Head to Toe Skin Assessment on the patient. ALL assessment sites listed below have been assessed.       Areas assessed by both nurses:   [x]   Head, Face, and Ears   [x]   Shoulders, Back, and Chest  [x]   Arms, Elbows, and Hands   [x]   Coccyx, Sacrum, and IschIum  [x]   Legs, Feet, and Heels        Does the Patient have Skin Breakdown?  No         Eleuterio Prevention initiated:  Yes   Wound Care Orders initiated:  No      [employer] nurse consulted for Pressure Injury (Stage 3,4, Unstageable, DTI, NWPT, and Complex wounds), New and Established Ostomies:  No      I agree that 2 Nurses have reviewed patient belongings with the patient/family and documented in the flowsheet upon admission or transfer to the unit.     Belongings  Dental Appliances: None  Vision - Corrective Lenses: Eyeglasses  Hearing Aid: None  Clothing: Socks, Undergarments, At bedside  Jewelry: None  Electronic Devices: None  Weapons (Notify Protective Services/Security): None  Home Medications: None  Valuables Given To: Patient  Provide Name(s) of Who Valuable(s) Were Given To: Brent Mina       Nurse 1 eSignature: Electronically signed by Guillermina Love RN on 11/27/24 at 2:24 PM EST    **SHARE this note so that the co-signing nurse is able to place an eSignature**    Nurse 2 eSignature: Electronically signed by Hima Patel RN on 11/27/24 at 2:25 PM EST  
  [facility] Medicine Progress Note  V 10.25      Date of Admission: 11/24/2024    Hospital Day: 2      Chief Admission Complaint:  Altered Mental Status (Pt arrived to ed via ems with c/o \"not acting right\" and being combative. )        Subjective:     Patient is lying in bed. He is getting a bath when I arrive. He is from a  memory care unit and is not oriented to place or situation. He knows who he is but has no idea as to why he is in the hospital. He has no complaints.     Presenting Admission History:       This is a 79 y.o. male who presented to [facility] with altered mental status, combative.  PMHx significant for hypertension, hyperlipidemia, prior prostate cancer, and Alzheimer disease .  History obtained from chart review as well as from ED provider.     Was recently admitted to this hospital 11/12/2411/19/24.  At that time was having blood loss anemia, treated for UTI and sent to mental care unit where he lives long-term.  Reportedly was doing well there but has been having increased aggression and being more combative which is not normal for him.  Subsequently referred back to the ED.  There was concerns of UTI.  Treated with Rocephin in the ED, admitted for altered mental status.    Assessment/Plan:      Current Principal Problem:  Encephalopathy    UTI - admission U/A demonstrating probable acute cystitis.  Metabolic encephalopathy 2/2 to possible UTI, resolved  - Infection evidenced by U/A with > 100 WBC, moderate Leukocyte esterase, and 4+ bacteria   - Culture ordered and pending  - Started on empiric Rocephin, pending Culture results.      Dementia/ Alzheimer's disease - w/out behavioral disturbance at baseline    - From memory care unit   - Controlled on home medication regimen - continued.    - Continue supportive care and redirection as needed.      Normocytic anemia, possibly 2/2 gross hematuria from clot evaluation on last admission  Baseline Hgb 8.5 -12.3  - Hgb 11/25/24 8.8  - Iron and 
  [facility] Medicine Progress Note  V 10.25      Date of Admission: 11/24/2024    Hospital Day: 3      Chief Admission Complaint:  Altered Mental Status (Pt arrived to ed via ems with c/o \"not acting right\" and being combative. )    Subjective:     Patient lying in bed and about to work with PT/OT. He has no complaints.     Presenting Admission History:       This is a 79 y.o. male who presented to [facility] with altered mental status, combative.  PMHx significant for hypertension, hyperlipidemia, prior prostate cancer, and Alzheimer disease .  History obtained from chart review as well as from ED provider.     Was recently admitted to this hospital 11/12/2411/19/24.  At that time was having blood loss anemia, treated for UTI and sent to mental care unit where he lives long-term.  Reportedly was doing well there but has been having increased aggression and being more combative which is not normal for him.  Subsequently referred back to the ED.  There was concerns of UTI.  Treated with Rocephin in the ED, admitted for altered mental status.    Assessment/Plan:      Current Principal Problem:  Encephalopathy    UTI - admission U/A demonstrating probable acute cystitis with chronic cho in place  Metabolic encephalopathy 2/2 to possible UTI, resolved  - Infection evidenced by U/A with > 100 WBC, moderate Leukocyte esterase, and 4+ bacteria   - Urine culture positive for Staphylococcus epidermidis   - Continue  on empiric Rocephin, pending Culture results.  Will switch to oral antx when sensitivities results.   - Urology consulted and appreciated.       Dementia/ Alzheimer's disease - w/out behavioral disturbance at baseline    - From memory care unit   - Controlled on home medication regimen - continued.    - Continue supportive care and redirection as needed.      Normocytic anemia, possibly 2/2 gross hematuria from clot evaluation on last admission  Baseline Hgb 8.5 -12.3  - Hgb 11/26/24 8.8  - Iron and mineral 
4 Eyes Skin Assessment     The patient is being assess for  Admission    I agree that 2 RN's have performed a thorough Head to Toe Skin Assessment on the patient. ALL assessment sites listed below have been assessed.       Areas assessed by both nurses:   [x]   Head, Face, and Ears   [x]   Shoulders, Back, and Chest  [x]   Arms, Elbows, and Hands   [x]   Coccyx, Sacrum, and IschIum  [x]   Legs, Feet, and Heels        Does the Patient have Skin Breakdown?  No         Eleuterio Prevention initiated:  Yes   Wound Care Orders initiated:  No      [facility] nurse consulted for Pressure Injury (Stage 3,4, Unstageable, DTI, NWPT, and Complex wounds), New and Established Ostomies:  No      Nurse 1 eSignature: Electronically signed by Vitaliy Grullon RN on 11/24/24 at 6:53 PM EST    **SHARE this note so that the co-signing nurse is able to place an eSignature**    Nurse 2 eSignature: Electronically signed by KODY BO RN on 11/25/24 at 5:59 AM EST         Redness blanchable buttocks.  
A&Ox1, confused. Denied pain/discomfort.   Placed on high - fowlers for oral meds. Meds crushed in applesauce. Avasys on. With intact and patent cho cath draining to dark-yellowish urine.   SR x 2.   Call light within reach.  Bed on lowest position. Bed alarm on.   
Aox1, client denies any chest pain, /SOB. Client resting comfortably in bed, bed in lowest position, bed alarm on, call light within reach.  
Client Aox1 to self,  resting comfortably in bed, no /SOB, no reported chest pain or pain on respiration, Srx2, bed in lowest position, call light within reach, bed alarm on.  
Comprehensive Nutrition Assessment    Type and Reason for Visit:  Initial, Positive nutrition screen    Nutrition Recommendations/Plan:   Continue Regular diet.   Continue Ensure Plus HP TID - prefers chocolate.   Magic Cup ice cream once/day.  Encourage PO intakes.   Monitor pertinent labs, bowel habits, weight, N/V, supplement acceptance, clinical progression.       Malnutrition Assessment:  Malnutrition Status:  Moderate malnutrition (11/25/24 1240)    Context:  Chronic Illness     Findings of the 6 clinical characteristics of malnutrition:  Energy Intake:  Mild decrease in energy intake  Weight Loss:  Unable to assess     Body Fat Loss:  Severe body fat loss Orbital, Triceps   Muscle Mass Loss:  Mild muscle mass loss Temples (temporalis), Clavicles (pectoralis & deltoids), Hand (interosseous)  Fluid Accumulation:  No fluid accumulation     Strength:  Not Performed    Nutrition Assessment:    Patient seen due to positive nutrition screen for weight loss and decreased appetite. Amitted for altered mental status. PMHx of hypertension, hyperlipidemia, prior prostate cancer, and Alzheimer disease. Patient nutritionally compromised AEB poor PO intakes. Pt seen resting in bed, no family in room. Patient drinking Ensure shakes, likes chocolate flavor. Agreeable to Magic Cup ice cream. PO intake of 1-25% per one meal recorded. Weight loss of -3lbs x3 months per chart review. New weight ordered. Will continue to monitor.    Nutrition Related Findings:    CO2 20, BUN 23, BS . LBM 11/25. Wound Type: None       Current Nutrition Intake & Therapies:    Average Meal Intake: 1-25%  Average Supplements Intake: Unable to assess  ADULT DIET; Regular  ADULT ORAL NUTRITION SUPPLEMENT; Breakfast, Lunch, Dinner; Standard High Calorie/High Protein Oral Supplement    Anthropometric Measures:  Height: 182.9 cm (6')  Ideal Body Weight (IBW): 178 lbs (81 kg)       Current Body Weight: 63.5 kg (140 lb) (11/24/24), 78.7 % IBW. 
Occupational Therapy  Facility/Department: [facility] C3 TELE/MED SURG/ONC  Occupational Therapy Initial Assessment and Treeatment    Name: Brent Mina  : 1945  MRN: [id number]  Date of Service: 2024    Discharge Recommendations:  Long Term Care without OT          Patient Diagnosis(es): The primary encounter diagnosis was Acute encephalopathy. A diagnosis of Acute cystitis without hematuria was also pertinent to this visit.  Past Medical History:  has a past medical history of ADHD (attention deficit hyperactivity disorder), Alzheimer disease (HCC), Hyperlipidemia, Hypertension, IFG (impaired fasting glucose), OCD (obsessive compulsive disorder), Osteoarthritis, Prostate cancer (HCC), Renal insufficiency, and TIA (transient ischemic attack).  Past Surgical History:  has a past surgical history that includes Colonoscopy (); Eye surgery; Prostatectomy; Intracapsular cataract extraction (Right, 10/21/2019); Intracapsular cataract extraction (Left, 10/31/2019); Femur fracture surgery (Left, 2023); and Cystoscopy (N/A, 2024).    Assessment   Pt admitted to [facility] with encephalopathy. PTA, pt was living at memory care facility and requiring assist for ADLs and transfers, however level of assist is unclear as pt is unable to provide PLOF info. Today pt requires Mod A x2 for bed mobility, and Max A x2 for stand pivot EOB>chair. Recommending OT services while in acute care to decrease assist level with fxl transfers and ADLs to reduce overall caregiver burden.     Performance deficits / Impairments: Decreased functional mobility ;Decreased ADL status;Decreased cognition;Decreased balance;Decreased strength;Decreased safe awareness;Decreased endurance  Prognosis: Fair  Decision Making: Medium Complexity  Activity Tolerance  Activity Tolerance: Treatment limited secondary to decreased cognition;Patient limited by fatigue     Plan  Occupational Therapy Plan  Times Per Week: 2-3x per week  Current 
PS to Tawana Sharma:  Pt is refusing any PO medications. Is there any that may be changed to IV push? Thanks    Awaiting response    
PS to Tawana Sharma: Pt with history alzheimer disease, is combative and refusing to take medications, /81. Will you place an order for IV Hyralazine. Thanks  
Pt a/o to self only. VSS. Pt returning to [facility].  All belongings collected and sent with the patient. The patient was discharged off the unit by stretcher and EMS in stable condition.   
Pt assessment completed and charted. VSS. Pt a/o to self only. Pt more calm and cooperative today. Pt denies any pain. Bed in lowest position and wheels locked. Call light within reach. Bedside table within reach. Non-skid socks in place. Pt denies any other needs at this time.   
Pt created abrasions bilaterally on nose due to pressing glasses down with force while wearing them. Pt denies any pain related to this.   
Pt is resting and remains calm as long as there is no interaction with the patient such as taking vitals, trying to encourage pt to drink or take medications.   
Pt received CHG and cho wipes this morning. Checked, clean and dry. Bed pad changed, gown changed. Cho canister emptied.   
Pt refusing vitals and to allow me to assess him. When attempted pt pulled away and stated \"No\". When asked pt would not respond to any questions and refused morning medications. Will attempt again later.   
Spoke with [employer] and son Drew Mina regarding patient's status and admission.  Electronically signed by Vitaliy Grullon RN on 11/24/2024 at 6:48 PM    
Writer offered pt assistance with meal. Pt stated he was not hungry. Patient became very agitated, verbally abusive and combative when trying to administer medication. Pt is non-interactive only responding with \"No\", \"shut-up\", or abusive words to any questions. Pt does have visible tremors when he becomes agitated. With assistance from charge nurse and PCA, brief was changed and pt was turned. Lights were dimmed and pt seem to calm down when left alone. PS message sent to on-call provider to see if any medications could possibly be changed to IV push. Order for Avasys was placed and son Truman Mina was notified.  
Impaired  Orientation Level: Oriented to person  Cognition  Overall Cognitive Status: Exceptions    Objective  Temp:  (pt refused vitals)  Pulse: 66  Heart Rate Source: Monitor  Respirations: 18  SpO2: 100 %  O2 Device: None (Room air)  BP: 105/64  MAP (Calculated): 78  BP Location: Right upper arm  BP Method: Automatic  Patient Position: Semi fowlers     Observation/Palpation  Posture: Fair  Gross Assessment  AROM: Generally decreased, functional  Strength: Generally decreased, functional  Coordination: Generally decreased, functional  Tone: Normal  Sensation: Intact                Bed Mobility Training  Bed Mobility Training: Yes  Overall Level of Assistance: Assist X2;Moderate assistance  Interventions: Verbal cues;Safety awareness training (Cueing for safety, sequencing)  Supine to Sit: Moderate assistance;Assist X2;Additional time;Adaptive equipment (HOB elevated, use of bed rails an HHA)  Scooting: Moderate assistance;Assist X2  Balance  Sitting: Impaired  Sitting - Static: Fair (occasional)  Sitting - Dynamic: Fair (occasional)  Standing: Impaired  Standing - Static: Constant support;Poor  Standing - Dynamic: Constant support;Poor  Transfer Training  Transfer Training: Yes  Overall Level of Assistance: Maximum assistance;Assist X2;Additional time (Max Ax2, HHA, SPT)  Interventions: Verbal cues;Tactile cues;Safety awareness training (cueing for safety, sequencing, hand placement, controlled ascent/descent)  Sit to Stand: Maximum assistance;Assist X2;Additional time (c B HHA, vc's for sequencing/safety, hand placement, keep feet on ground)  Stand to Sit: Maximum assistance;Assist X2;Additional time (c B HHA, vc's for sequencing/safety, hand placement, keep feet on ground)  Stand Pivot Transfers: Maximum assistance;Assist X2 (c B HHA, vc's for sequencing/safety, hand placement, keep feet on ground)  Bed to Chair: Assist X2;Maximum assistance (c B HHA, vc's for sequencing/safety, hand placement, keep feet on

## 2024-11-27 NOTE — CONSULTS
iron sucrose (VENOFER) 200 mg in sodium chloride 0.9 % 100 mL IVPB, 200 mg, IntraVENous, Q24H  nitrofurantoin (macrocrystal-monohydrate) (MACROBID) capsule 100 mg, 100 mg, Oral, BID  divalproex (DEPAKOTE ER) extended release tablet 250 mg, 250 mg, Oral, Nightly  sodium chloride flush 0.9 % injection 5-40 mL, 5-40 mL, IntraVENous, 2 times per day  sodium chloride flush 0.9 % injection 5-40 mL, 5-40 mL, IntraVENous, PRN  0.9 % sodium chloride infusion, , IntraVENous, PRN  potassium chloride (KLOR-CON M) extended release tablet 40 mEq, 40 mEq, Oral, PRN **OR** potassium bicarb-citric acid (EFFER-K) effervescent tablet 40 mEq, 40 mEq, Oral, PRN **OR** potassium chloride 10 mEq/100 mL IVPB (Peripheral Line), 10 mEq, IntraVENous, PRN  magnesium sulfate 2000 mg in 50 mL IVPB premix, 2,000 mg, IntraVENous, PRN  enoxaparin (LOVENOX) injection 40 mg, 40 mg, SubCUTAneous, Daily  ondansetron (ZOFRAN-ODT) disintegrating tablet 4 mg, 4 mg, Oral, Q8H PRN **OR** ondansetron (ZOFRAN) injection 4 mg, 4 mg, IntraVENous, Q6H PRN  polyethylene glycol (GLYCOLAX) packet 17 g, 17 g, Oral, Daily PRN  acetaminophen (TYLENOL) tablet 650 mg, 650 mg, Oral, Q6H PRN **OR** acetaminophen (TYLENOL) suppository 650 mg, 650 mg, Rectal, Q6H PRN  amLODIPine (NORVASC) tablet 10 mg, 10 mg, Oral, Daily  aspirin EC tablet 81 mg, 81 mg, Oral, Daily  atorvastatin (LIPITOR) tablet 40 mg, 40 mg, Oral, Nightly  FLUoxetine (PROZAC) capsule 60 mg, 60 mg, Oral, Daily  lidocaine (XYLOCAINE) 2 % uro-jet, , Topical, PRN  rivastigmine (EXELON) capsule 6 mg, 6 mg, Oral, BID  hydrALAZINE (APRESOLINE) injection 10 mg, 10 mg, IntraVENous, Once    Vitals:  /61   Pulse 68   Temp 98 °F (36.7 °C) (Oral)   Resp 18   Ht 1.829 m (6')   Wt 63.5 kg (140 lb)   SpO2 100%   BMI 18.99 kg/m²     Intake/Output Summary (Last 24 hours) at 11/27/2024 1143  Last data filed at 11/27/2024 0649  Gross per 24 hour   Intake --   Output 750 ml   Net -750 ml       Physical

## 2024-11-27 NOTE — PLAN OF CARE
Problem: Risk for Elopement  Goal: Patient will not exit the unit/facility without proper excort  11/27/2024 1321 by Guillermina Love RN  Outcome: Completed  11/27/2024 1309 by Guillermina Love RN  Outcome: Progressing  Flowsheets (Taken 11/27/2024 1309)  Nursing Interventions for Elopement Risk:   Assist with personal care needs such as toileting, eating, dressing, as needed to reduce the risk of wandering   Collaborate with family members/caregivers to mitigate the elopement risk   Make sure patient has all necessary personal care items   Reduce environmental triggers   Shoes and clothing collected and placed in gown attire     Problem: Skin/Tissue Integrity  Goal: Absence of new skin breakdown  Description: 1.  Monitor for areas of redness and/or skin breakdown  2.  Assess vascular access sites hourly  3.  Every 4-6 hours minimum:  Change oxygen saturation probe site  4.  Every 4-6 hours:  If on nasal continuous positive airway pressure, respiratory therapy assess nares and determine need for appliance change or resting period.  11/27/2024 1321 by Guillermina Love RN  Outcome: Completed  11/27/2024 1309 by Guillermina Love RN  Outcome: Progressing     Problem: Safety - Adult  Goal: Free from fall injury  11/27/2024 1321 by Guillermina Love RN  Outcome: Completed  11/27/2024 1309 by Guillermina Love RN  Outcome: Progressing  Flowsheets (Taken 11/27/2024 1309)  Free From Fall Injury:   Instruct family/caregiver on patient safety   Based on caregiver fall risk screen, instruct family/caregiver to ask for assistance with transferring infant if caregiver noted to have fall risk factors     Problem: ABCDS Injury Assessment  Goal: Absence of physical injury  11/27/2024 1321 by Guillermina Love RN  Outcome: Completed  11/27/2024 1309 by Guillermina Love RN  Outcome: Progressing  Flowsheets (Taken 11/27/2024 1309)  Absence of Physical Injury: Implement safety measures based on patient assessment     Problem: Nutrition  Daughter

## 2024-11-27 NOTE — PLAN OF CARE
Problem: Risk for Elopement  Goal: Patient will not exit the unit/facility without proper excort  Outcome: Progressing  Flowsheets (Taken 11/27/2024 1309)  Nursing Interventions for Elopement Risk:   Assist with personal care needs such as toileting, eating, dressing, as needed to reduce the risk of wandering   Collaborate with family members/caregivers to mitigate the elopement risk   Make sure patient has all necessary personal care items   Reduce environmental triggers   Shoes and clothing collected and placed in gown attire     Problem: Skin/Tissue Integrity  Goal: Absence of new skin breakdown  Description: 1.  Monitor for areas of redness and/or skin breakdown  2.  Assess vascular access sites hourly  3.  Every 4-6 hours minimum:  Change oxygen saturation probe site  4.  Every 4-6 hours:  If on nasal continuous positive airway pressure, respiratory therapy assess nares and determine need for appliance change or resting period.  Outcome: Progressing     Problem: Safety - Adult  Goal: Free from fall injury  Outcome: Progressing  Flowsheets (Taken 11/27/2024 1309)  Free From Fall Injury:   Instruct family/caregiver on patient safety   Based on caregiver fall risk screen, instruct family/caregiver to ask for assistance with transferring infant if caregiver noted to have fall risk factors     Problem: ABCDS Injury Assessment  Goal: Absence of physical injury  Outcome: Progressing  Flowsheets (Taken 11/27/2024 1309)  Absence of Physical Injury: Implement safety measures based on patient assessment     Problem: Nutrition Deficit:  Goal: Optimize nutritional status  Outcome: Progressing  Flowsheets (Taken 11/27/2024 1309)  Nutrient intake appropriate for improving, restoring, or maintaining nutritional needs:   Assess nutritional status and recommend course of action   Monitor oral intake, labs, and treatment plans   Provide specific nutrition education to patient or family as appropriate

## 2024-11-28 ENCOUNTER — HOSPITAL ENCOUNTER (EMERGENCY)
Age: 79
Discharge: OTHER INSTITUTION | End: 2024-11-28
Attending: EMERGENCY MEDICINE
Payer: MEDICARE

## 2024-11-28 VITALS
TEMPERATURE: 98.7 F | RESPIRATION RATE: 20 BRPM | SYSTOLIC BLOOD PRESSURE: 129 MMHG | DIASTOLIC BLOOD PRESSURE: 69 MMHG | HEART RATE: 81 BPM | OXYGEN SATURATION: 100 %

## 2024-11-28 DIAGNOSIS — T83.9XXA URINARY CATHETER COMPLICATION, INITIAL ENCOUNTER (HCC): Primary | ICD-10-CM

## 2024-11-28 PROCEDURE — 99283 EMERGENCY DEPT VISIT LOW MDM: CPT

## 2024-11-28 PROCEDURE — 51702 INSERT TEMP BLADDER CATH: CPT

## 2024-11-28 NOTE — ED PROVIDER NOTES
None            Bedside Ultrasound  No results found.     Radiology  No results found.    Labs  No results found for this visit on 11/28/24.    Procedures  Procedures    ED Course       Patient's Sagastume catheter was inserted uneventfully.  He has yellow clear urine, with a pink tint.    I reassessed the patient who continues to deny physical complaints.  Able to range his arms and legs and neck normally.  No signs of head, thorax, extremity trauma    No further complaints.  Will discharge with Sagastume catheter, outpatient urology follow-up, and return precautions    I discussed the results with patient/family including incidental findings.    Is this patient to be included in the SEP-1 core measure? No Exclusion criteria - the patient is NOT to be included for SEP-1 Core Measure due to: Infection is not suspected    Consults/discussion with other professionals: None    Clinical Impression:  1. Urinary catheter complication, initial encounter ([facility])          Disposition / Plan:  Decision To Discharge  Condition: stable  Blood pressure 129/69, pulse 81, temperature 98.7 °F (37.1 °C), temperature source Oral, resp. rate 20, SpO2 100%.      Patient was given the following medications. I counseled patient how to take these medications.   New Prescriptions    No medications on file       Follow Up / Referral (if applicable):  [facility]  [address] [place], [place] [id number] [phone]  Schedule an appointment as soon as possible for a visit         I, Ranjit Rodriugez, am the primary attending of record and contributed the majority of evaluation and treatment of emergent care for this encounter.     This chart was generated in part by using Dragon Dictation system and may contain errors related to that system including errors in grammar, punctuation, and spelling, as well as words and phrases that may be inappropriate. If there are any questions or concerns please feel free to contact the dictating provider for

## 2024-11-28 NOTE — DISCHARGE INSTRUCTIONS
You were seen after Sagastume catheter dislodgment.    Your exam was reassuring.  A new catheter was placed.  Follow-up with urology and primary care.  Return with any new concerns

## 2024-12-09 ENCOUNTER — APPOINTMENT (OUTPATIENT)
Dept: CT IMAGING | Age: 79
End: 2024-12-09
Payer: MEDICARE

## 2024-12-09 ENCOUNTER — HOSPITAL ENCOUNTER (EMERGENCY)
Age: 79
Discharge: HOME | End: 2024-12-10
Attending: STUDENT IN AN ORGANIZED HEALTH CARE EDUCATION/TRAINING PROGRAM
Payer: MEDICARE

## 2024-12-09 ENCOUNTER — APPOINTMENT (OUTPATIENT)
Dept: GENERAL RADIOLOGY | Age: 79
End: 2024-12-09
Payer: MEDICARE

## 2024-12-09 DIAGNOSIS — N39.0 URINARY TRACT INFECTION WITH HEMATURIA, SITE UNSPECIFIED: ICD-10-CM

## 2024-12-09 DIAGNOSIS — R41.82 ALTERED MENTAL STATUS, UNSPECIFIED ALTERED MENTAL STATUS TYPE: Primary | ICD-10-CM

## 2024-12-09 DIAGNOSIS — R31.9 URINARY TRACT INFECTION WITH HEMATURIA, SITE UNSPECIFIED: ICD-10-CM

## 2024-12-09 LAB
ALBUMIN SERPL-MCNC: 3.5 G/DL (ref 3.4–5)
ALBUMIN/GLOB SERPL: 1.3 {RATIO} (ref 1.1–2.2)
ALP SERPL-CCNC: 75 U/L (ref 40–129)
ALT SERPL-CCNC: 13 U/L (ref 10–40)
AMMONIA PLAS-SCNC: 27 UMOL/L (ref 16–60)
ANION GAP SERPL CALCULATED.3IONS-SCNC: 9 MMOL/L (ref 3–16)
AST SERPL-CCNC: 16 U/L (ref 15–37)
BASE EXCESS BLDV CALC-SCNC: 0.4 MMOL/L (ref -3–3)
BASOPHILS # BLD: 0.1 K/UL (ref 0–0.2)
BASOPHILS NFR BLD: 0.9 %
BILIRUB SERPL-MCNC: <0.2 MG/DL (ref 0–1)
BILIRUB UR QL STRIP.AUTO: NEGATIVE
BUN SERPL-MCNC: 30 MG/DL (ref 7–20)
CALCIUM SERPL-MCNC: 8.9 MG/DL (ref 8.3–10.6)
CHLORIDE SERPL-SCNC: 102 MMOL/L (ref 99–110)
CLARITY UR: ABNORMAL
CO2 BLDV-SCNC: 24 MMOL/L
CO2 SERPL-SCNC: 25 MMOL/L (ref 21–32)
COHGB MFR BLDV: 0.3 % (ref 0–1.5)
COLOR UR: ABNORMAL
CREAT SERPL-MCNC: 0.8 MG/DL (ref 0.8–1.3)
DEPRECATED RDW RBC AUTO: 13.7 % (ref 12.4–15.4)
EOSINOPHIL # BLD: 0 K/UL (ref 0–0.6)
EOSINOPHIL NFR BLD: 0.6 %
GFR SERPLBLD CREATININE-BSD FMLA CKD-EPI: 90 ML/MIN/{1.73_M2}
GLUCOSE SERPL-MCNC: 98 MG/DL (ref 70–99)
GLUCOSE UR STRIP.AUTO-MCNC: 100 MG/DL
HCO3 BLDV-SCNC: 22.8 MMOL/L (ref 23–29)
HCT VFR BLD AUTO: 26 % (ref 40.5–52.5)
HGB BLD-MCNC: 9 G/DL (ref 13.5–17.5)
HGB UR QL STRIP.AUTO: ABNORMAL
KETONES UR STRIP.AUTO-MCNC: 15 MG/DL
LEUKOCYTE ESTERASE UR QL STRIP.AUTO: ABNORMAL
LYMPHOCYTES # BLD: 0.7 K/UL (ref 1–5.1)
LYMPHOCYTES NFR BLD: 10 %
MCH RBC QN AUTO: 32 PG (ref 26–34)
MCHC RBC AUTO-ENTMCNC: 34.7 G/DL (ref 31–36)
MCV RBC AUTO: 92.1 FL (ref 80–100)
METHGB MFR BLDV: 0.3 %
MONOCYTES # BLD: 0.6 K/UL (ref 0–1.3)
MONOCYTES NFR BLD: 9.1 %
NEUTROPHILS # BLD: 5.4 K/UL (ref 1.7–7.7)
NEUTROPHILS NFR BLD: 79.4 %
NITRITE UR QL STRIP.AUTO: NEGATIVE
NT-PROBNP SERPL-MCNC: 198 PG/ML (ref 0–449)
O2 THERAPY: ABNORMAL
PCO2 BLDV: 28.9 MMHG (ref 40–50)
PH BLDV: 7.51 [PH] (ref 7.35–7.45)
PH UR STRIP.AUTO: 8.5 [PH] (ref 5–8)
PLATELET # BLD AUTO: 339 K/UL (ref 135–450)
PMV BLD AUTO: 7.6 FL (ref 5–10.5)
PO2 BLDV: 103.4 MMHG (ref 25–40)
POTASSIUM SERPL-SCNC: 4 MMOL/L (ref 3.5–5.1)
PROT SERPL-MCNC: 6.3 G/DL (ref 6.4–8.2)
PROT UR STRIP.AUTO-MCNC: 100 MG/DL
RBC # BLD AUTO: 2.83 M/UL (ref 4.2–5.9)
RBC #/AREA URNS HPF: >100 /HPF (ref 0–4)
SAO2 % BLDV: 98 %
SODIUM SERPL-SCNC: 136 MMOL/L (ref 136–145)
SP GR UR STRIP.AUTO: 1.01 (ref 1–1.03)
TROPONIN, HIGH SENSITIVITY: 27 NG/L (ref 0–22)
TROPONIN, HIGH SENSITIVITY: 29 NG/L (ref 0–22)
UA COMPLETE W REFLEX CULTURE PNL UR: ABNORMAL
UA DIPSTICK W REFLEX MICRO PNL UR: YES
URN SPEC COLLECT METH UR: ABNORMAL
UROBILINOGEN UR STRIP-ACNC: 2 E.U./DL
WBC # BLD AUTO: 6.8 K/UL (ref 4–11)
WBC #/AREA URNS HPF: ABNORMAL /HPF (ref 0–5)

## 2024-12-09 PROCEDURE — 74176 CT ABD & PELVIS W/O CONTRAST: CPT

## 2024-12-09 PROCEDURE — 85025 COMPLETE CBC W/AUTO DIFF WBC: CPT

## 2024-12-09 PROCEDURE — 87077 CULTURE AEROBIC IDENTIFY: CPT

## 2024-12-09 PROCEDURE — 2580000003 HC RX 258: Performed by: STUDENT IN AN ORGANIZED HEALTH CARE EDUCATION/TRAINING PROGRAM

## 2024-12-09 PROCEDURE — 81001 URINALYSIS AUTO W/SCOPE: CPT

## 2024-12-09 PROCEDURE — 71045 X-RAY EXAM CHEST 1 VIEW: CPT

## 2024-12-09 PROCEDURE — 80053 COMPREHEN METABOLIC PANEL: CPT

## 2024-12-09 PROCEDURE — 84484 ASSAY OF TROPONIN QUANT: CPT

## 2024-12-09 PROCEDURE — 51702 INSERT TEMP BLADDER CATH: CPT

## 2024-12-09 PROCEDURE — 93005 ELECTROCARDIOGRAM TRACING: CPT | Performed by: STUDENT IN AN ORGANIZED HEALTH CARE EDUCATION/TRAINING PROGRAM

## 2024-12-09 PROCEDURE — 82803 BLOOD GASES ANY COMBINATION: CPT

## 2024-12-09 PROCEDURE — 96365 THER/PROPH/DIAG IV INF INIT: CPT

## 2024-12-09 PROCEDURE — 82140 ASSAY OF AMMONIA: CPT

## 2024-12-09 PROCEDURE — 99285 EMERGENCY DEPT VISIT HI MDM: CPT

## 2024-12-09 PROCEDURE — 6360000002 HC RX W HCPCS: Performed by: STUDENT IN AN ORGANIZED HEALTH CARE EDUCATION/TRAINING PROGRAM

## 2024-12-09 PROCEDURE — 83880 ASSAY OF NATRIURETIC PEPTIDE: CPT

## 2024-12-09 PROCEDURE — 87086 URINE CULTURE/COLONY COUNT: CPT

## 2024-12-09 RX ADMIN — SODIUM CHLORIDE 1000 MG: 900 INJECTION INTRAVENOUS at 21:47

## 2024-12-09 ASSESSMENT — PAIN - FUNCTIONAL ASSESSMENT: PAIN_FUNCTIONAL_ASSESSMENT: NONE - DENIES PAIN

## 2024-12-09 NOTE — ED PROVIDER NOTES
Urine Reflex to Culture Not Indicated    Microscopic Urinalysis   Result Value Ref Range    WBC, UA see below 0 - 5 /HPF    RBC, UA >100 (A) 0 - 4 /HPF   EKG 12 Lead (Abn HR)   Result Value Ref Range    Ventricular Rate 61 BPM    Atrial Rate 61 BPM    P-R Interval 158 ms    QRS Duration 78 ms    Q-T Interval 452 ms    QTc Calculation (Bazett) 455 ms    P Axis 77 degrees    R Axis 57 degrees    T Axis 71 degrees    Diagnosis       Normal sinus rhythmNormal ECGWhen compared with ECG of 13-NOV-2024 07:11,No significant change was found       ED BEDSIDE ULTRASOUND:  na    RECENT VITALS:  BP: 133/68, Temp: 97.9 °F (36.6 °C), Pulse: 62,Respirations: 16, SpO2: 99 %     Procedures     na    ED Course and MDM     Brent Mina is a 79 y.o. male who presents with concern for altered mental status and possible UTI with hematuria history of similar recently on background of dementia.  Here the patient is afebrile hemodynamically stable in no acute distress he is quite chronically ill-appearing but has no acute complaints.  Physical examination is reassuring we will initiate workup for altered mental status and with particular attention to the possibility of UTI we will exchange his Sagastume and send a fresh urine specimen.    ED Course as of 12/10/24 0008   Mon Dec 09, 2024   2108 Chest x-ray without acute process [NG]   2108 WBC: 6.8 [NG]   2108 Hemoglobin Quant(!): 9.0 [NG]   2108 Hematocrit(!): 26.0 [NG]   2108 Platelet Count: 339 [NG]   2108 Blood counts unremarkable hemoglobin stable chronic anemia [NG]   2109 pH, Syed(!): 7.514 [NG]   2109 pCO2, Syed(!): 28.9 [NG]   2109 Bicarbonate, Venous(!): 22.8  Primary respiratory alkalosis [NG]   2109 Ammonia: 27  Ammonia benign [NG]   2109 Sodium: 136 [NG]   2109 Potassium: 4.0 [NG]   2109 Chloride: 102 [NG]   2109 CARBON DIOXIDE: 25 [NG]   2109 Anion Gap: 9 [NG]   2109 Glucose: 98 [NG]   2109 BUN,BUNPL(!): 30 [NG]   2109 Creatinine: 0.8 [NG]   2109 Calcium: 8.9 [NG]   2109 Total

## 2024-12-10 VITALS
OXYGEN SATURATION: 100 % | BODY MASS INDEX: 18.99 KG/M2 | WEIGHT: 140 LBS | TEMPERATURE: 97.9 F | DIASTOLIC BLOOD PRESSURE: 67 MMHG | RESPIRATION RATE: 16 BRPM | SYSTOLIC BLOOD PRESSURE: 126 MMHG | HEART RATE: 57 BPM

## 2024-12-10 LAB
BACTERIA UR CULT: ABNORMAL
EKG ATRIAL RATE: 61 BPM
EKG DIAGNOSIS: NORMAL
EKG P AXIS: 77 DEGREES
EKG P-R INTERVAL: 158 MS
EKG Q-T INTERVAL: 452 MS
EKG QRS DURATION: 78 MS
EKG QTC CALCULATION (BAZETT): 455 MS
EKG R AXIS: 57 DEGREES
EKG T AXIS: 71 DEGREES
EKG VENTRICULAR RATE: 61 BPM
ORGANISM: ABNORMAL

## 2024-12-10 PROCEDURE — 93010 ELECTROCARDIOGRAM REPORT: CPT | Performed by: INTERNAL MEDICINE

## 2024-12-10 RX ORDER — CEFDINIR 300 MG/1
300 CAPSULE ORAL 2 TIMES DAILY
Qty: 14 CAPSULE | Refills: 0 | Status: SHIPPED | OUTPATIENT
Start: 2024-12-10 | End: 2024-12-17

## 2024-12-10 NOTE — DISCHARGE INSTRUCTIONS
You were evaluated in the emergency department for concern for altered mental status possibly in the context of urinary tract infection. Assessments and testing completed during your visit were reassuring and at this time there is no indication for further testing, treatment or admission to the hospital. Given this it is appropriate to discharge you from the emergency department. At the time of discharge we discussed the following:    Please take the antibiotics to completion you will be notified if urine culture requires alternative treatment.    Please note that sometimes it is difficult to diagnose a medical condition early in the disease process before the disease is fully manifest. Because of this, should you develop any new or worsening symptoms, you may return at any time to the emergency department for another evaluation. If available you are also recommended to review this visit with your primary care physician or other medical provider in the next 7 days. Thank you for allowing us to care for you today.

## 2024-12-10 NOTE — ED NOTES
Called [facility] due to velvetad  @ 0130 and squad is not here yet and its 0224. Dispatcher said atiya will be here at 0232.

## 2024-12-11 NOTE — RESULT ENCOUNTER NOTE
Culture reviewed, please contact nursing home and patient should start fluconazole 200 mg by mouth daily for 14 days in the context of Candida cystitis.  No refills.

## 2025-01-21 ENCOUNTER — HOSPITAL ENCOUNTER (EMERGENCY)
Age: 80
Discharge: HOME | End: 2025-01-21
Payer: MEDICARE

## 2025-01-21 ENCOUNTER — APPOINTMENT (OUTPATIENT)
Dept: CT IMAGING | Age: 80
End: 2025-01-21
Payer: MEDICARE

## 2025-01-21 VITALS
DIASTOLIC BLOOD PRESSURE: 53 MMHG | OXYGEN SATURATION: 100 % | TEMPERATURE: 98.2 F | HEART RATE: 58 BPM | RESPIRATION RATE: 16 BRPM | SYSTOLIC BLOOD PRESSURE: 108 MMHG

## 2025-01-21 DIAGNOSIS — N30.01 ACUTE CYSTITIS WITH HEMATURIA: Primary | ICD-10-CM

## 2025-01-21 DIAGNOSIS — R31.9 HEMATURIA, UNSPECIFIED TYPE: ICD-10-CM

## 2025-01-21 LAB
ALBUMIN SERPL-MCNC: 3.4 G/DL (ref 3.4–5)
ALBUMIN/GLOB SERPL: 1.1 {RATIO} (ref 1.1–2.2)
ALP SERPL-CCNC: 72 U/L (ref 40–129)
ALT SERPL-CCNC: 8 U/L (ref 10–40)
ANION GAP SERPL CALCULATED.3IONS-SCNC: 10 MMOL/L (ref 3–16)
AST SERPL-CCNC: 13 U/L (ref 15–37)
BACTERIA URNS QL MICRO: ABNORMAL /HPF
BASOPHILS # BLD: 0.1 K/UL (ref 0–0.2)
BASOPHILS NFR BLD: 0.9 %
BILIRUB SERPL-MCNC: <0.2 MG/DL (ref 0–1)
BILIRUB UR QL STRIP.AUTO: ABNORMAL
BUN SERPL-MCNC: 38 MG/DL (ref 7–20)
CALCIUM SERPL-MCNC: 8.2 MG/DL (ref 8.3–10.6)
CHLORIDE SERPL-SCNC: 104 MMOL/L (ref 99–110)
CLARITY UR: ABNORMAL
CO2 SERPL-SCNC: 25 MMOL/L (ref 21–32)
COLOR UR: ABNORMAL
CREAT SERPL-MCNC: 1 MG/DL (ref 0.8–1.3)
DEPRECATED RDW RBC AUTO: 13.2 % (ref 12.4–15.4)
EOSINOPHIL # BLD: 0.1 K/UL (ref 0–0.6)
EOSINOPHIL NFR BLD: 1.8 %
EPI CELLS #/AREA URNS HPF: ABNORMAL /HPF (ref 0–5)
GFR SERPLBLD CREATININE-BSD FMLA CKD-EPI: 76 ML/MIN/{1.73_M2}
GLUCOSE SERPL-MCNC: 100 MG/DL (ref 70–99)
GLUCOSE UR STRIP.AUTO-MCNC: ABNORMAL MG/DL
HCT VFR BLD AUTO: 26 % (ref 40.5–52.5)
HGB BLD-MCNC: 8.8 G/DL (ref 13.5–17.5)
HGB UR QL STRIP.AUTO: ABNORMAL
KETONES UR STRIP.AUTO-MCNC: ABNORMAL MG/DL
LEUKOCYTE ESTERASE UR QL STRIP.AUTO: ABNORMAL
LYMPHOCYTES # BLD: 0.8 K/UL (ref 1–5.1)
LYMPHOCYTES NFR BLD: 9.1 %
MCH RBC QN AUTO: 29.9 PG (ref 26–34)
MCHC RBC AUTO-ENTMCNC: 33.8 G/DL (ref 31–36)
MCV RBC AUTO: 88.6 FL (ref 80–100)
MONOCYTES # BLD: 0.6 K/UL (ref 0–1.3)
MONOCYTES NFR BLD: 7.8 %
MUCOUS THREADS #/AREA URNS LPF: ABNORMAL /LPF
NEUTROPHILS # BLD: 6.7 K/UL (ref 1.7–7.7)
NEUTROPHILS NFR BLD: 80.4 %
NITRITE UR QL STRIP.AUTO: ABNORMAL
PH UR STRIP.AUTO: ABNORMAL [PH] (ref 5–8)
PLATELET # BLD AUTO: 352 K/UL (ref 135–450)
PMV BLD AUTO: 7.2 FL (ref 5–10.5)
POTASSIUM SERPL-SCNC: 4.1 MMOL/L (ref 3.5–5.1)
PROT SERPL-MCNC: 6.4 G/DL (ref 6.4–8.2)
PROT UR STRIP.AUTO-MCNC: ABNORMAL MG/DL
RBC # BLD AUTO: 2.94 M/UL (ref 4.2–5.9)
RBC #/AREA URNS HPF: >100 /HPF (ref 0–4)
SODIUM SERPL-SCNC: 139 MMOL/L (ref 136–145)
SP GR UR STRIP.AUTO: ABNORMAL (ref 1–1.03)
UA COMPLETE W REFLEX CULTURE PNL UR: ABNORMAL
UA DIPSTICK W REFLEX MICRO PNL UR: YES
URN SPEC COLLECT METH UR: ABNORMAL
UROBILINOGEN UR STRIP-ACNC: ABNORMAL E.U./DL
WBC # BLD AUTO: 8.3 K/UL (ref 4–11)
WBC #/AREA URNS HPF: ABNORMAL /HPF (ref 0–5)

## 2025-01-21 PROCEDURE — 99285 EMERGENCY DEPT VISIT HI MDM: CPT

## 2025-01-21 PROCEDURE — 80053 COMPREHEN METABOLIC PANEL: CPT

## 2025-01-21 PROCEDURE — 6360000004 HC RX CONTRAST MEDICATION: Performed by: PHYSICIAN ASSISTANT

## 2025-01-21 PROCEDURE — 74177 CT ABD & PELVIS W/CONTRAST: CPT

## 2025-01-21 PROCEDURE — 6370000000 HC RX 637 (ALT 250 FOR IP): Performed by: PHYSICIAN ASSISTANT

## 2025-01-21 PROCEDURE — 85025 COMPLETE CBC W/AUTO DIFF WBC: CPT

## 2025-01-21 PROCEDURE — 81001 URINALYSIS AUTO W/SCOPE: CPT

## 2025-01-21 RX ORDER — IOPAMIDOL 755 MG/ML
75 INJECTION, SOLUTION INTRAVASCULAR
Status: COMPLETED | OUTPATIENT
Start: 2025-01-21 | End: 2025-01-21

## 2025-01-21 RX ORDER — CEFDINIR 300 MG/1
300 CAPSULE ORAL ONCE
Status: COMPLETED | OUTPATIENT
Start: 2025-01-21 | End: 2025-01-21

## 2025-01-21 RX ORDER — CEFDINIR 300 MG/1
300 CAPSULE ORAL 2 TIMES DAILY
Qty: 20 CAPSULE | Refills: 0 | Status: SHIPPED | OUTPATIENT
Start: 2025-01-21 | End: 2025-01-31

## 2025-01-21 RX ADMIN — IOPAMIDOL 75 ML: 755 INJECTION, SOLUTION INTRAVENOUS at 18:43

## 2025-01-21 RX ADMIN — CEFDINIR 300 MG: 300 CAPSULE ORAL at 21:11

## 2025-01-21 ASSESSMENT — PAIN - FUNCTIONAL ASSESSMENT: PAIN_FUNCTIONAL_ASSESSMENT: NONE - DENIES PAIN

## 2025-01-22 NOTE — ED NOTES
Report received from SHAYLA Brooks. Pt waiting on [facility] to provide ride back to [place] [facility] ETA of 0015.

## 2025-01-22 NOTE — ED NOTES
NAD, resp e/u on RA, d/c with [facility] back to [place] [facility] [place] report called to NH all questions/concerns addressed.

## 2025-01-22 NOTE — ED NOTES
Pt resting in bed eyes closed, easily arousable to verbal, NAD, resp e/u on RA, bed locked lowest position, rails up x2, call light within reach, placed on BP cuff and Pulse ox, pt has Sagastume Catheter in place draining bloody urine, pt denies needs or complaints at this time.

## 2025-01-24 ENCOUNTER — APPOINTMENT (OUTPATIENT)
Dept: CT IMAGING | Age: 80
End: 2025-01-24
Payer: MEDICARE

## 2025-01-24 ENCOUNTER — HOSPITAL ENCOUNTER (EMERGENCY)
Age: 80
Discharge: HOME | End: 2025-01-24
Attending: EMERGENCY MEDICINE
Payer: MEDICARE

## 2025-01-24 VITALS
SYSTOLIC BLOOD PRESSURE: 132 MMHG | DIASTOLIC BLOOD PRESSURE: 84 MMHG | RESPIRATION RATE: 15 BRPM | HEART RATE: 82 BPM | TEMPERATURE: 98.7 F | OXYGEN SATURATION: 98 %

## 2025-01-24 DIAGNOSIS — S09.90XA CLOSED HEAD INJURY, INITIAL ENCOUNTER: Primary | ICD-10-CM

## 2025-01-24 DIAGNOSIS — R31.0 GROSS HEMATURIA: ICD-10-CM

## 2025-01-24 PROCEDURE — 99284 EMERGENCY DEPT VISIT MOD MDM: CPT

## 2025-01-24 PROCEDURE — 70450 CT HEAD/BRAIN W/O DYE: CPT

## 2025-01-24 NOTE — ED PROVIDER NOTES
[facility] Emergency Department    CHIEF COMPLAINT  Hematuria (Patient from [facility], sent over for reports of blood in his urine. Has hx of prostate cancer and dementia. Patient has indwelling cho cath with notable blood in bag)      SHARED SERVICE VISIT  Evaluated by JUSTIN.  My supervising physician was available for consultation.    HISTORY OF PRESENT ILLNESS  Brent Mina is a 80 y.o. male who presents to the ED complaining of hematuria.  He is currently a resident at the [facility].  Has a history of dementia as well as prostate cancer.  He does have an indwelling catheter in place and staff at facility noticed blood in the bag so they sent him the emergency department for further evaluation.  He denies any abdominal pain.  Denies any nausea or vomiting at this time. Denies any headache, body ache, fevers or chills.  Denies any coughing or sneezing.  Denies any sore throat or congestion.  Denies any vision changes or dizziness.  Denies any chest pain, shortness of breath, or dyspnea on exertion. Denies any diarrhea or bloody stools.  Denies any new onset back pain.  Denies any recent travel or sick contacts.    No other complaints, modifying factors or associated symptoms.     Nursing notes reviewed.   Past Medical History:   Diagnosis Date    ADHD (attention deficit hyperactivity disorder)     Alzheimer disease (HCC)     Hyperlipidemia     Hypertension     IFG (impaired fasting glucose)     OCD (obsessive compulsive disorder)     Osteoarthritis     Prostate cancer (HCC)     Renal insufficiency     TIA (transient ischemic attack)      Past Surgical History:   Procedure Laterality Date    COLONOSCOPY  2004    CYSTOSCOPY N/A 11/13/2024    CYSTOSCOPY EVACUATION OF CLOTS performed by Paz Huang MD at [facility] OR    EYE SURGERY      FEMUR FRACTURE SURGERY Left 4/20/2023    LEFT HIP GAMMA NAILING WITH CABLES                  GUERITA performed

## 2025-01-30 ENCOUNTER — APPOINTMENT (OUTPATIENT)
Dept: GENERAL RADIOLOGY | Age: 80
End: 2025-01-30
Payer: MEDICARE

## 2025-01-30 ENCOUNTER — HOSPITAL ENCOUNTER (EMERGENCY)
Age: 80
Discharge: HOME | End: 2025-01-30
Attending: EMERGENCY MEDICINE
Payer: MEDICARE

## 2025-01-30 ENCOUNTER — APPOINTMENT (OUTPATIENT)
Dept: CT IMAGING | Age: 80
End: 2025-01-30
Payer: MEDICARE

## 2025-01-30 VITALS
TEMPERATURE: 98.2 F | HEART RATE: 112 BPM | RESPIRATION RATE: 12 BRPM | SYSTOLIC BLOOD PRESSURE: 126 MMHG | DIASTOLIC BLOOD PRESSURE: 72 MMHG | OXYGEN SATURATION: 100 %

## 2025-01-30 DIAGNOSIS — D72.829 LEUKOCYTOSIS, UNSPECIFIED TYPE: ICD-10-CM

## 2025-01-30 DIAGNOSIS — D62 ACUTE BLOOD LOSS ANEMIA: ICD-10-CM

## 2025-01-30 DIAGNOSIS — R31.9 HEMATURIA, UNSPECIFIED TYPE: Primary | ICD-10-CM

## 2025-01-30 DIAGNOSIS — N17.9 ACUTE RENAL FAILURE, UNSPECIFIED ACUTE RENAL FAILURE TYPE (HCC): ICD-10-CM

## 2025-01-30 LAB
ALBUMIN SERPL-MCNC: 3.7 G/DL (ref 3.4–5)
ALBUMIN/GLOB SERPL: 1.3 {RATIO} (ref 1.1–2.2)
ALP SERPL-CCNC: 83 U/L (ref 40–129)
ALT SERPL-CCNC: 13 U/L (ref 10–40)
ANION GAP SERPL CALCULATED.3IONS-SCNC: 15 MMOL/L (ref 3–16)
AST SERPL-CCNC: 33 U/L (ref 15–37)
BACTERIA URNS QL MICRO: ABNORMAL /HPF
BASOPHILS # BLD: 0 K/UL (ref 0–0.2)
BASOPHILS NFR BLD: 0.1 %
BILIRUB SERPL-MCNC: 0.3 MG/DL (ref 0–1)
BILIRUB UR QL STRIP.AUTO: ABNORMAL
BUN SERPL-MCNC: 41 MG/DL (ref 7–20)
CALCIUM SERPL-MCNC: 9.2 MG/DL (ref 8.3–10.6)
CHLORIDE SERPL-SCNC: 104 MMOL/L (ref 99–110)
CLARITY UR: ABNORMAL
CO2 SERPL-SCNC: 21 MMOL/L (ref 21–32)
COLOR UR: ABNORMAL
CREAT SERPL-MCNC: 2.1 MG/DL (ref 0.8–1.3)
DEPRECATED RDW RBC AUTO: 13 % (ref 12.4–15.4)
EOSINOPHIL # BLD: 0 K/UL (ref 0–0.6)
EOSINOPHIL NFR BLD: 0 %
FLUAV RNA RESP QL NAA+PROBE: NOT DETECTED
FLUBV RNA RESP QL NAA+PROBE: NOT DETECTED
GFR SERPLBLD CREATININE-BSD FMLA CKD-EPI: 31 ML/MIN/{1.73_M2}
GLUCOSE SERPL-MCNC: 113 MG/DL (ref 70–99)
GLUCOSE UR STRIP.AUTO-MCNC: ABNORMAL MG/DL
HCT VFR BLD AUTO: 19.2 % (ref 40.5–52.5)
HGB BLD-MCNC: 6.3 G/DL (ref 13.5–17.5)
HGB UR QL STRIP.AUTO: ABNORMAL
KETONES UR STRIP.AUTO-MCNC: ABNORMAL MG/DL
LACTATE BLDV-SCNC: 3.6 MMOL/L (ref 0.4–2)
LEUKOCYTE ESTERASE UR QL STRIP.AUTO: ABNORMAL
LYMPHOCYTES # BLD: 0.4 K/UL (ref 1–5.1)
LYMPHOCYTES NFR BLD: 2.1 %
MCH RBC QN AUTO: 28.3 PG (ref 26–34)
MCHC RBC AUTO-ENTMCNC: 32.5 G/DL (ref 31–36)
MCV RBC AUTO: 87.1 FL (ref 80–100)
MONOCYTES # BLD: 1 K/UL (ref 0–1.3)
MONOCYTES NFR BLD: 5.3 %
NEUTROPHILS # BLD: 18 K/UL (ref 1.7–7.7)
NEUTROPHILS NFR BLD: 92.5 %
NITRITE UR QL STRIP.AUTO: ABNORMAL
PH UR STRIP.AUTO: ABNORMAL [PH] (ref 5–8)
PLATELET # BLD AUTO: 460 K/UL (ref 135–450)
PMV BLD AUTO: 7.2 FL (ref 5–10.5)
POTASSIUM SERPL-SCNC: 5.1 MMOL/L (ref 3.5–5.1)
PROT SERPL-MCNC: 6.6 G/DL (ref 6.4–8.2)
PROT UR STRIP.AUTO-MCNC: ABNORMAL MG/DL
RBC # BLD AUTO: 2.21 M/UL (ref 4.2–5.9)
RBC #/AREA URNS HPF: >100 /HPF (ref 0–4)
SARS-COV-2 RNA RESP QL NAA+PROBE: NOT DETECTED
SODIUM SERPL-SCNC: 140 MMOL/L (ref 136–145)
SP GR UR STRIP.AUTO: ABNORMAL (ref 1–1.03)
UA DIPSTICK W REFLEX MICRO PNL UR: YES
URN SPEC COLLECT METH UR: ABNORMAL
UROBILINOGEN UR STRIP-ACNC: ABNORMAL E.U./DL
WBC # BLD AUTO: 19.4 K/UL (ref 4–11)
WBC #/AREA URNS HPF: ABNORMAL /HPF (ref 0–5)

## 2025-01-30 PROCEDURE — 87040 BLOOD CULTURE FOR BACTERIA: CPT

## 2025-01-30 PROCEDURE — 80053 COMPREHEN METABOLIC PANEL: CPT

## 2025-01-30 PROCEDURE — 87636 SARSCOV2 & INF A&B AMP PRB: CPT

## 2025-01-30 PROCEDURE — 70450 CT HEAD/BRAIN W/O DYE: CPT

## 2025-01-30 PROCEDURE — 2580000003 HC RX 258: Performed by: EMERGENCY MEDICINE

## 2025-01-30 PROCEDURE — 81001 URINALYSIS AUTO W/SCOPE: CPT

## 2025-01-30 PROCEDURE — 99284 EMERGENCY DEPT VISIT MOD MDM: CPT

## 2025-01-30 PROCEDURE — 83605 ASSAY OF LACTIC ACID: CPT

## 2025-01-30 PROCEDURE — 6360000002 HC RX W HCPCS: Performed by: EMERGENCY MEDICINE

## 2025-01-30 PROCEDURE — 96361 HYDRATE IV INFUSION ADD-ON: CPT

## 2025-01-30 PROCEDURE — 71045 X-RAY EXAM CHEST 1 VIEW: CPT

## 2025-01-30 PROCEDURE — 96374 THER/PROPH/DIAG INJ IV PUSH: CPT

## 2025-01-30 PROCEDURE — 85025 COMPLETE CBC W/AUTO DIFF WBC: CPT

## 2025-01-30 RX ORDER — LORAZEPAM 2 MG/ML
1 INJECTION INTRAMUSCULAR ONCE
Status: COMPLETED | OUTPATIENT
Start: 2025-01-30 | End: 2025-01-30

## 2025-01-30 RX ORDER — 0.9 % SODIUM CHLORIDE 0.9 %
1000 INTRAVENOUS SOLUTION INTRAVENOUS ONCE
Status: COMPLETED | OUTPATIENT
Start: 2025-01-30 | End: 2025-01-30

## 2025-01-30 RX ADMIN — SODIUM CHLORIDE 1000 ML: 9 INJECTION, SOLUTION INTRAVENOUS at 15:35

## 2025-01-30 RX ADMIN — LORAZEPAM 1 MG: 2 INJECTION INTRAMUSCULAR; INTRAVENOUS at 16:11

## 2025-01-30 ASSESSMENT — PAIN SCALES - PAIN ASSESSMENT IN ADVANCED DEMENTIA (PAINAD)
BODYLANGUAGE: RELAXED
TOTALSCORE: 0
BREATHING: NORMAL
CONSOLABILITY: NO NEED TO CONSOLE
FACIALEXPRESSION: SMILING OR INEXPRESSIVE

## 2025-01-30 ASSESSMENT — PAIN - FUNCTIONAL ASSESSMENT: PAIN_FUNCTIONAL_ASSESSMENT: PAIN ASSESSMENT IN ADVANCED DEMENTIA (PAINAD)

## 2025-01-30 NOTE — ED PROVIDER NOTES
[facility] EMERGENCY DEPARTMENT     EMERGENCY DEPARTMENT ENCOUNTER            Pt Name: Brent Mina   MRN: [id number]   Birthdate 1945   Date of evaluation: 1/30/2025   Provider: Luciano Marte II, DO   PCP: Aravind Reed MD   Note Started: 3:26 PM EST 1/30/25          CHIEF COMPLAINT     Chief Complaint   Patient presents with    Hematuria     By EMS from assisted living for worsening confusion and hematuria d/t pulling cho catheter. Pt on hospice at facility and family rescinded when patient pulled catheter.             HISTORY OF PRESENT ILLNESS:   History from : Patient   Limitations to history : None     Brent Mina is a 80 y.o. male who presents to the emergency department with hematuria.  Patient reportedly is in hospice for end-stage dementia.  Patient was sent by squad from dementia unit with concerns of hematuria after patient pulled on his indwelling Cho catheter.  Additionally, nursing home reports increasing confusion.    Addendum: Patient's wife was available shortly after arrival.  I discussed her wishes for patient's care.  She states that patient has end-stage dementia and is at his baseline mental status currently.  She understands that patient has some hematuria but furthermore states that patient would not want additional life-saving care at this time such as blood, surgery, etc.          Nursing Notes were all reviewed and agreed with, or any disagreements were addressed in the HPI.     REVIEW OF SYSTEMS :    Positives and Pertinent negatives as per HPI.      MEDICAL HISTORY   has a past medical history of ADHD (attention deficit hyperactivity disorder), Alzheimer disease (HCC), Hyperlipidemia, Hypertension, IFG (impaired fasting glucose), OCD (obsessive compulsive disorder), Osteoarthritis, Prostate cancer (HCC), Renal insufficiency, and TIA (transient ischemic attack).    Past Surgical History:   Procedure Laterality Date    COLONOSCOPY  2004    CYSTOSCOPY N/A

## 2025-01-31 LAB
BACTERIA BLD CULT ORG #2: NORMAL
BACTERIA BLD CULT: NORMAL

## 2025-02-03 LAB
BACTERIA BLD CULT ORG #2: NORMAL
BACTERIA BLD CULT: NORMAL

## 2025-02-10 NOTE — ED PROVIDER NOTES
ischemic change. No acute intracranial hemorrhage or mass effect seen. Electronically signed by Aravind Hawley MD       - Patient seen and evaluated in room 6.  80 y.o. male presented for falling out of bed. He has gross hematuria noted but when I reviewed patient's chart, I noted this was a chronic problem.  Sagastume is draining and functioning properly.  CT head performed given that patient could not provide a coherent history and unclear if he hit his head.  CT head unremarkable.  No intracranial hemorrhage or traumatic injury from the fall.  Will discharge the patient back to nursing home.        - Is this patient to be included in the SEP-1 Core Measure due to severe sepsis or septic shock?   No   Exclusion criteria - the patient is NOT to be included for SEP-1 Core Measure due to:  Infection is not suspected    I estimate there is LOW risk for TRAUMATIC SUBARACHNOID HEMORRHAGE, SUBDURAL HEMATOMA, EPIDURAL HEMATOMA, CAROTID DISSECTION, VERTEBRAL ARTERY DISSECTION, FRACTURED SKULL, CAUDA EQUINA or CENTRAL CORD SYNDROME, COMPARTMENT SYNDROME, EPIDURAL MASS LESION, HERNIATED DISK CAUSING SEVERE STENOSIS,  INTRA-ABDOMINAL INJURY, RUPTURED ABDOMINAL AORTIC ANEURYSM, PERFORATED BOWEL, TENDON or NEUROVASCULAR INJURY, or a THORACIC AORTIC DISSECTION, thus I consider the discharge disposition reasonable. Also, there is no evidence or peritonitis, sepsis, or toxicity. Brent Mina and I have discussed the diagnosis and risks, and we agree with discharging home to follow-up with PCP. We also discussed returning to the Emergency Department immediately if new or worsening symptoms occur. We have discussed the symptoms which are most concerning (e.g., bloody stool, fever, changing or worsening pain, vomiting) that necessitate immediate return.     Clinical Impression:  1. Closed head injury, initial encounter    2. Gross hematuria          Disposition:  Discharge to nursing home in stable condition.    Blood pressure

## 2025-04-27 NOTE — ED TRIAGE NOTES
Med list completed with patient and his wife. All information is believed to be true and accurate. RYNE TENA  [id number]    HISTORY OF PRESENT ILLNESS: 51 y/o F with PMHx of asthma, HIV(undetectable), rheumatoid arthritis (on Rinvoq), sleeve gastrectomy (2023) s/p panniculectomy w/ dada de david(4/17/2025) c/o neck pain, b/l shoulder, knee pain x 10 days.  Patient states she stopped her Rinvoq 4/7/25 10 days prior to surgery on 4/17/25.  Patient states her rheumatologist is Ellen Wild, who told her not to restart Rinvoq until sutures come out, however patient states sutures in her abdomen are absorbable, saw surgeon yesterday with no concern for surgical site infection.  Pt states pain in neck, b/l shoulder/knees/wrists, worsened yesterday, difficulty ambulating 2/2 to knee pain. She was prescribed short course Oxycodone 5mg yesterday from surgery team and states this slightly helps with pain. Patient denies fevers, vomiting, diarrhea, abdominal pain, SOB, trauma.  In the emergency department patient was evaluated, basic lab work revealed elevated CRP otherwise no acute concerning findings.  ED spoke to rheum team who advised can see patient but likely not until tomorrow.  CDU confirmed with room plan to evaluate patient with intention to likely start patient on a steroid taper after evaluation.  Patient accepted to CDU for pain control in Rheum evaluation    Rheumatology consulted to evaluate for RA flare.     Follows with Ellen Wild for seropositive erosive RA, last seen March 2025   Was on rinvoq but stopped it 4/7 due to abdominal panniculectomy procedure on 4/17, never resumed it   Of note, serologies positive for RF, CCP, SUE, dsDNA, and RNP   Also had US R wrist January 2025 showing erosive changes of distal radioulnar and radiocarpal joints, also effusion with synovial hypertrophy   As per Ellen's note, she was planning to switch her therapy from KHANH inhibitor     Rheum ROS    Denies fevers/chills/weight loss/night sweats/alopecia/sinus disease/asthma history/oral ulcers/dysphagia/vision changes/Raynauds/VTE/miscarraiges/sicca symptoms/urinary changes/edema/SOB/joint pain/swelling/jaw claudication/rash/photosensitivity/morning stiffness    Endorses fevers/chills/weight loss/night sweats/alopecia/sinus disease/asthma history/oral ulcers/dysphagia/vision changes/Raynauds/VTE/miscarraiges/sicca symptoms/urinary changes/edema/SOB/joint pain/swelling/jaw claudication/rash/photosensitivity/morning stiffness      MEDICATIONS  (STANDING):  ascorbic acid 500 milliGRAM(s) Oral daily  bictegravir 50 mG/emtricitabine 200 mG/tenofovir alafenamide 25 mG (BIKTARVY) 1 Tablet(s) Oral daily  cholecalciferol 1000 Unit(s) Oral at bedtime  folic acid 1 milliGRAM(s) Oral daily  hydrochlorothiazide 12.5 milliGRAM(s) Oral daily  minoxidil 5 milliGRAM(s) Oral daily  multivitamin 1 Tablet(s) Oral daily    MEDICATIONS  (PRN):  acetaminophen     Tablet .. 975 milliGRAM(s) Oral every 6 hours PRN Mild Pain (1 - 3)  ibuprofen  Tablet. 600 milliGRAM(s) Oral every 6 hours PRN Moderate Pain (4 - 6)  oxyCODONE    IR 5 milliGRAM(s) Oral every 6 hours PRN Severe Pain (7 - 10)      Allergies    Bactrim (Hives)  Retrovir (Hives)    Intolerances        PERTINENT MEDICATION HISTORY:    SOCIAL HISTORY:  OCCUPATION:  TRAVEL HISTORY:    FAMILY HISTORY:  FH: breast cancer (Mother)    Family history of type 2 diabetes mellitus (Father)        Vital Signs Last 24 Hrs  T(C): 36.6 (27 Apr 2025 07:39), Max: 37.1 (26 Apr 2025 19:59)  T(F): 97.8 (27 Apr 2025 07:39), Max: 98.8 (26 Apr 2025 19:59)  HR: 74 (27 Apr 2025 07:39) (69 - 88)  BP: 124/80 (27 Apr 2025 07:39) (110/66 - 142/64)  BP(mean): 85 (26 Apr 2025 13:40) (85 - 85)  RR: 18 (27 Apr 2025 07:39) (18 - 20)  SpO2: 97% (27 Apr 2025 07:39) (97% - 100%)    Parameters below as of 27 Apr 2025 07:39  Patient On (Oxygen Delivery Method): room air        ROS as noted above     Physical Exam:  General: No apparent distress  HEENT: EOMI, MMM  CVS: +S1/S2, RRR, no murmurs/rubs/gallops  Resp: CTA b/l. No crackles/wheezing  GI: Soft, NT/ND +BS  MSK: no swelling/warmth/erythema of the joints of the UE/LE  Neuro: AAOx3  Skin: no visible rashes    LABS:                        10.4   6.66  )-----------( 334      ( 26 Apr 2025 10:56 )             30.5     04-26    140  |  105  |  13  ----------------------------<  103[H]  3.6   |  21[L]  |  0.67    Ca    9.1      26 Apr 2025 10:56    TPro  6.5  /  Alb  3.2[L]  /  TBili  0.3  /  DBili  x   /  AST  14  /  ALT  10  /  AlkPhos  110  04-26      Urinalysis Basic - ( 26 Apr 2025 10:56 )    Color: x / Appearance: x / SG: x / pH: x  Gluc: 103 mg/dL / Ketone: x  / Bili: x / Urobili: x   Blood: x / Protein: x / Nitrite: x   Leuk Esterase: x / RBC: x / WBC x   Sq Epi: x / Non Sq Epi: x / Bacteria: x          Sedimentation Rate, Erythrocyte: 79 mm/hr *H* [0 - 20] (04-26-25 @ 10:56)  C-Reactive Protein: 48 mg/L *H* [0 - 4] (04-26-25 @ 10:56)  Histone Antibody, Serum: 3.1 Units *H* [0.0 - 0.9] [Negative                <1.0                           Weak Positive      1.0 - 1.5                           Moderate Positive  1.6 - 2.5                           Strong Positive         >2.5  Performed At:  [facility] [address] [id number]  Renan LINDA MD Ph:[phone]] (07-08-16 @ 02:37)  Anti Nuclear Factor Titer: >1:2560 *!* (07-07-16 @ 21:30)         RYNE TENA  [id number]    HISTORY OF PRESENT ILLNESS: 51 y/o F with PMHx of asthma, HIV(undetectable), rheumatoid arthritis (on Rinvoq), sleeve gastrectomy (2023) s/p panniculectomy w/ dada de lis(4/17/2025) c/o neck pain, b/l shoulder, knee pain x 10 days.  Patient states she stopped her Rinvoq 4/7/25 10 days prior to surgery on 4/17/25.  Patient states her rheumatologist is Ellen Wild, who told her not to restart Rinvoq until sutures come out, however patient states sutures in her abdomen are absorbable, saw surgeon yesterday with no concern for surgical site infection.  Pt states pain in neck, b/l shoulder/knees/wrists, worsened yesterday, difficulty ambulating 2/2 to knee pain. She was prescribed short course Oxycodone 5mg yesterday from surgery team and states this slightly helps with pain. Patient denies fevers, vomiting, diarrhea, abdominal pain, SOB, trauma.  In the emergency department patient was evaluated, basic lab work revealed elevated CRP otherwise no acute concerning findings.  ED spoke to rheum team who advised can see patient but likely not until tomorrow.  CDU confirmed with room plan to evaluate patient with intention to likely start patient on a steroid taper after evaluation.  Patient accepted to CDU for pain control in Rheum evaluation    Rheumatology consulted to evaluate for RA flare.     -Follows with Ellen Wild for seropositive erosive RA, last seen March 2025   -Was on rinvoq but stopped it 4/7 due to abdominal panniculectomy procedure on 4/17, never resumed it   -Of note, serologies positive for RF, CCP, SUE, dsDNA, and RNP   -Also had US R wrist January 2025 showing erosive changes of distal radioulnar and radiocarpal joints, also effusion with synovial hypertrophy   -As per Ellen's note, she was planning to switch her therapy from KHANH inhibitor   -Pt reports RA activity since February 2025 when she had multiple infections (flu and PNA)   -Now with worsening pain, swelling, warmth, and stiffness of b/l wrists (R>L), MCPs, PIPs, elbows, shoulders, knees (L>R), and feet  -Had post op visit with Surgeon on 4/25 with no concern for infection  -Denies fevers, chills, chest pain, cough, SOB, abdominal complaints, ulcers, Raynaud's, sicca symptoms, or urinary changes  -S/p solumedrol 20 mg IV in the ED with 60-70% improvement, has been able to ambulate           MEDICATIONS  (STANDING):  ascorbic acid 500 milliGRAM(s) Oral daily  bictegravir 50 mG/emtricitabine 200 mG/tenofovir alafenamide 25 mG (BIKTARVY) 1 Tablet(s) Oral daily  cholecalciferol 1000 Unit(s) Oral at bedtime  folic acid 1 milliGRAM(s) Oral daily  hydrochlorothiazide 12.5 milliGRAM(s) Oral daily  minoxidil 5 milliGRAM(s) Oral daily  multivitamin 1 Tablet(s) Oral daily    MEDICATIONS  (PRN):  acetaminophen     Tablet .. 975 milliGRAM(s) Oral every 6 hours PRN Mild Pain (1 - 3)  ibuprofen  Tablet. 600 milliGRAM(s) Oral every 6 hours PRN Moderate Pain (4 - 6)  oxyCODONE    IR 5 milliGRAM(s) Oral every 6 hours PRN Severe Pain (7 - 10)      Allergies    Bactrim (Hives)  Retrovir (Hives)    Intolerances        PERTINENT MEDICATION HISTORY: refer to H&P     SOCIAL HISTORY: lives at home   TRAVEL HISTORY: no recent travel     FAMILY HISTORY:  FH: breast cancer (Mother)    Family history of type 2 diabetes mellitus (Father)        Vital Signs Last 24 Hrs  T(C): 36.6 (27 Apr 2025 07:39), Max: 37.1 (26 Apr 2025 19:59)  T(F): 97.8 (27 Apr 2025 07:39), Max: 98.8 (26 Apr 2025 19:59)  HR: 74 (27 Apr 2025 07:39) (69 - 88)  BP: 124/80 (27 Apr 2025 07:39) (110/66 - 142/64)  BP(mean): 85 (26 Apr 2025 13:40) (85 - 85)  RR: 18 (27 Apr 2025 07:39) (18 - 20)  SpO2: 97% (27 Apr 2025 07:39) (97% - 100%)    Parameters below as of 27 Apr 2025 07:39  Patient On (Oxygen Delivery Method): room air        ROS as noted above     Physical Exam:  General: No apparent distress, alert   HEENT: EOMI, MMM, no ulcers   MSK: synovitis of b/l wrists, occasional MCPs/PIPs, shoulders, knees (L>R), and R ankle   Neuro: AAOx3  Skin: no visible rashes    LABS:                        10.4   6.66  )-----------( 334      ( 26 Apr 2025 10:56 )             30.5     04-26    140  |  105  |  13  ----------------------------<  103[H]  3.6   |  21[L]  |  0.67    Ca    9.1      26 Apr 2025 10:56    TPro  6.5  /  Alb  3.2[L]  /  TBili  0.3  /  DBili  x   /  AST  14  /  ALT  10  /  AlkPhos  110  04-26      Urinalysis Basic - ( 26 Apr 2025 10:56 )    Color: x / Appearance: x / SG: x / pH: x  Gluc: 103 mg/dL / Ketone: x  / Bili: x / Urobili: x   Blood: x / Protein: x / Nitrite: x   Leuk Esterase: x / RBC: x / WBC x   Sq Epi: x / Non Sq Epi: x / Bacteria: x          Sedimentation Rate, Erythrocyte: 79 mm/hr *H* [0 - 20] (04-26-25 @ 10:56)  C-Reactive Protein: 48 mg/L *H* [0 - 4] (04-26-25 @ 10:56)  Histone Antibody, Serum: 3.1 Units *H* [0.0 - 0.9] [Negative                <1.0                           Weak Positive      1.0 - 1.5                           Moderate Positive  1.6 - 2.5                           Strong Positive         >2.5  Performed At:  [facility] [address] [id number]  Renan LINDA MD Ph:[phone]] (07-08-16 @ 02:37)  Anti Nuclear Factor Titer: >1:2560 *!* (07-07-16 @ 21:30)         RYNE TENA  [id number]    HISTORY OF PRESENT ILLNESS: 51 y/o F with PMHx of asthma, HIV(undetectable), rheumatoid arthritis (on Rinvoq), sleeve gastrectomy (2023) s/p panniculectomy w/ dada de lis(4/17/2025) c/o neck pain, b/l shoulder, knee pain x 10 days.  Patient states she stopped her Rinvoq 4/7/25 10 days prior to surgery on 4/17/25.  Patient states her rheumatologist is Ellen Wild, who told her not to restart Rinvoq until sutures come out, however patient states sutures in her abdomen are absorbable, saw surgeon yesterday with no concern for surgical site infection.  Pt states pain in neck, b/l shoulder/knees/wrists, worsened yesterday, difficulty ambulating 2/2 to knee pain. She was prescribed short course Oxycodone 5mg yesterday from surgery team and states this slightly helps with pain. Patient denies fevers, vomiting, diarrhea, abdominal pain, SOB, trauma.  In the emergency department patient was evaluated, basic lab work revealed elevated CRP otherwise no acute concerning findings.  ED spoke to rheum team who advised can see patient but likely not until tomorrow.  CDU confirmed with room plan to evaluate patient with intention to likely start patient on a steroid taper after evaluation.  Patient accepted to CDU for pain control in Rheum evaluation    Rheumatology consulted to evaluate for RA flare.     -Follows with Ellen Wild for seropositive erosive RA, last seen March 2025   -Was on rinvoq but stopped it 4/7 due to abdominal panniculectomy procedure on 4/17, never resumed it   -Of note, serologies positive for RF, CCP, SUE, dsDNA, and RNP   -Also had US R wrist January 2025 showing erosive changes of distal radioulnar and radiocarpal joints, also effusion with synovial hypertrophy   -As per Ellen's note, she was planning to switch her therapy from KHANH inhibitor   -Pt reports RA activity since February 2025 when she had multiple infections (flu and PNA)   -Now with worsening pain, swelling, warmth, and stiffness of b/l wrists (R>L), MCPs, PIPs, elbows, shoulders, knees (L>R), and feet  -Had post op visit with Surgeon on 4/25 with no concern for infection  -Denies fevers, chills, chest pain, cough, SOB, abdominal complaints, ulcers, Raynaud's, sicca symptoms, or urinary changes  -S/p solumedrol 20 mg IV in the ED with 60-70% improvement, has been able to ambulate         MEDICATIONS  (STANDING):  ascorbic acid 500 milliGRAM(s) Oral daily  bictegravir 50 mG/emtricitabine 200 mG/tenofovir alafenamide 25 mG (BIKTARVY) 1 Tablet(s) Oral daily  cholecalciferol 1000 Unit(s) Oral at bedtime  folic acid 1 milliGRAM(s) Oral daily  hydrochlorothiazide 12.5 milliGRAM(s) Oral daily  minoxidil 5 milliGRAM(s) Oral daily  multivitamin 1 Tablet(s) Oral daily    MEDICATIONS  (PRN):  acetaminophen     Tablet .. 975 milliGRAM(s) Oral every 6 hours PRN Mild Pain (1 - 3)  ibuprofen  Tablet. 600 milliGRAM(s) Oral every 6 hours PRN Moderate Pain (4 - 6)  oxyCODONE    IR 5 milliGRAM(s) Oral every 6 hours PRN Severe Pain (7 - 10)      Allergies    Bactrim (Hives)  Retrovir (Hives)    Intolerances        PERTINENT MEDICATION HISTORY: refer to H&P     SOCIAL HISTORY: lives at home   TRAVEL HISTORY: no recent travel     FAMILY HISTORY:  FH: breast cancer (Mother)    Family history of type 2 diabetes mellitus (Father)        Vital Signs Last 24 Hrs  T(C): 36.6 (27 Apr 2025 07:39), Max: 37.1 (26 Apr 2025 19:59)  T(F): 97.8 (27 Apr 2025 07:39), Max: 98.8 (26 Apr 2025 19:59)  HR: 74 (27 Apr 2025 07:39) (69 - 88)  BP: 124/80 (27 Apr 2025 07:39) (110/66 - 142/64)  BP(mean): 85 (26 Apr 2025 13:40) (85 - 85)  RR: 18 (27 Apr 2025 07:39) (18 - 20)  SpO2: 97% (27 Apr 2025 07:39) (97% - 100%)    Parameters below as of 27 Apr 2025 07:39  Patient On (Oxygen Delivery Method): room air        ROS as noted above     Physical Exam:  General: No apparent distress, alert   HEENT: EOMI, MMM, no ulcers   MSK: synovitis of b/l wrists, occasional MCPs/PIPs, shoulders, knees (L>R), and R ankle   Abdomen: drain noted without signs of infection   Neuro: AAOx3  Skin: no visible rashes    LABS:                        10.4   6.66  )-----------( 334      ( 26 Apr 2025 10:56 )             30.5     04-26    140  |  105  |  13  ----------------------------<  103[H]  3.6   |  21[L]  |  0.67    Ca    9.1      26 Apr 2025 10:56    TPro  6.5  /  Alb  3.2[L]  /  TBili  0.3  /  DBili  x   /  AST  14  /  ALT  10  /  AlkPhos  110  04-26      Urinalysis Basic - ( 26 Apr 2025 10:56 )    Color: x / Appearance: x / SG: x / pH: x  Gluc: 103 mg/dL / Ketone: x  / Bili: x / Urobili: x   Blood: x / Protein: x / Nitrite: x   Leuk Esterase: x / RBC: x / WBC x   Sq Epi: x / Non Sq Epi: x / Bacteria: x          Sedimentation Rate, Erythrocyte: 79 mm/hr *H* [0 - 20] (04-26-25 @ 10:56)  C-Reactive Protein: 48 mg/L *H* [0 - 4] (04-26-25 @ 10:56)  Histone Antibody, Serum: 3.1 Units *H* [0.0 - 0.9] [Negative                <1.0                           Weak Positive      1.0 - 1.5                           Moderate Positive  1.6 - 2.5                           Strong Positive         >2.5  Performed At:  [facility] [id number]  Renan LINDA MD Ph:[phone]] (07-08-16 @ 02:37)  Anti Nuclear Factor Titer: >1:2560 *!* (07-07-16 @ 21:30)

## 2025-04-29 NOTE — CARE COORDINATION
CASE MANAGEMENT DISCHARGE SUMMARY      Discharge to: [facility] with resumption of [facility].     Precertification completed: na  Hospital Exemption Notification (HENS) completed: na    IMM given: 11/27/24    New Durable Medical Equipment ordered/agency: none    Transportation:    Family/car:   Medical Transport explained to pt/family. Pt/family voice no agency preference.    Agency used:[facility]    time:3pm   Ambulance form completed: Yes    Confirmed discharge plan with:     Patient: yes     Family:  yes Latanya notified via phone      Facility/Agency, name:  [facility] faxed pulled per [facility] and faxed to [facility] at [phone]   Phone number for report to facility: [phone]     RN, name: Guillermina    Note: Discharging nurse to complete [facility], reconcile [facility], and place final copy with patient's discharge packet. RN to ensure that written prescriptions for  Level II medications are sent with patient to the facility as per protocol.    Fatimah Mayen RN      
Case Management Assessment  Initial Evaluation    Date/Time of Evaluation: 11/25/2024 4:25 PM  Assessment Completed by: Fatimah Mayen RN    If patient is discharged prior to next notation, then this note serves as note for discharge by case management.    Patient Name: Brent Mina                   YOB: 1945  Diagnosis: Encephalopathy [G93.40]  Acute cystitis without hematuria [N30.00]  Acute encephalopathy [G93.40]                   Date / Time: 11/24/2024  1:01 PM    Patient Admission Status: Inpatient   Readmission Risk (Low < 19, Mod (19-27), High > 27): Readmission Risk Score: 23.4    Current PCP: Aravind Reed MD  PCP verified by CM? Yes (facility MD)    Chart Reviewed: Yes      History Provided by: Spouse  Patient Orientation: Unable to Assess    Patient Cognition: Dementia / Early Alzheimer's    Hospitalization in the last 30 days (Readmission):  Yes    If yes, Readmission Assessment in  Navigator will be completed.    Advance Directives:      Code Status: DNR-CCA   Patient's Primary Decision Maker is: Legal Next of Kin    Primary Decision Maker: Latanya Mina - Spouse - [phone]    Secondary Decision Maker: Drew Mina - Child - [phone]    Discharge Planning:    Patient lives with: Alone Type of Home: Long-Term Care  Primary Care Giver: Other (Comment) ([facility] care x2 years)  Patient Support Systems include: Spouse/Significant Other, Children   Current Financial resources: Medicare  Current community resources: Other (Comment) (memory care)  Current services prior to admission: Durable Medical Equipment            Current DME: Wheelchair            Type of Home Care services:  Aide Services, Nursing Services, PT, OT    ADLS  Prior functional level: Assistance with the following:, Bathing, Dressing, Toileting, Feeding, Cooking, Mobility, Shopping, Housework  Current functional level: Assistance with the following:, Bathing, Dressing, Toileting, 
Chart reviewed. Therapy with recs for return to [facility] memory care with resumption of Therapy. [facility] was active PTA will resume. Fatimah Mayen RN    
LAKE vasquez

## (undated) DEVICE — SET GRAV VENT NVENT CK VLV 3 NDL FREE PRT 10 GTT

## (undated) DEVICE — SOLUTION IRRIG 1000ML STRL H2O USP PLAS POUR BTL

## (undated) DEVICE — ELECTRODE ECG MONITR FOAM TEAR DROP ADLT RED

## (undated) DEVICE — SUTURE VCRL + SZ 2-0 L36IN ABSRB UD L36MM CT-1 1/2 CIR VCP945H

## (undated) DEVICE — OPTIFOAM GENTLE SA, POSTOP, 4X8: Brand: MEDLINE

## (undated) DEVICE — CATHETER IV 20GA L1.25IN PNK FEP SFTY STR HUB RADPQ DISP

## (undated) DEVICE — FILTER NEEDLE: Brand: MONOJECT

## (undated) DEVICE — SURGICAL PROCEDURE PACK EYE ANDRSN

## (undated) DEVICE — GLOVE SURG SZ 75 L12IN FNGR THK94MIL STD WHT LTX FREE

## (undated) DEVICE — SET ADMIN PRIMING 7ML L30IN 7.35LB 20 GTT 2ND RLER CLMP

## (undated) DEVICE — SOLUTION IRRIG 250ML STRL H2O PLAS POUR BTL USP

## (undated) DEVICE — GOWN,SIRUS,NONRNF,SETINSLV,XL,20/CS: Brand: MEDLINE

## (undated) DEVICE — GLOVE SURG SZ 65 L12IN FNGR THK94MIL STD WHT LTX FREE

## (undated) DEVICE — SILICONE I/A TIP STRAIGHT: Brand: ALCON

## (undated) DEVICE — Device

## (undated) DEVICE — PATIENT CARE KIT EYE POST OP

## (undated) DEVICE — 6619 2 PTNT ISO SYS INCISE AREA&LT;(&GT;&&LT;)&GT;P: Brand: STERI-DRAPE™ IOBAN™ 2

## (undated) DEVICE — AIRLIFE™ NASAL OXYGEN CANNULA CURVED, FLARED TIP, WITH 7 FEET (2.1 M) CRUSH RESISTANT TUBING, OVER-THE-EAR STYLE: Brand: AIRLIFE™

## (undated) DEVICE — BASIC SINGLE BASIN 1-LF: Brand: MEDLINE INDUSTRIES, INC.

## (undated) DEVICE — SOLUTION IRRIG 1000ML 09% SOD CHL USP PIC PLAS CONTAINER

## (undated) DEVICE — DRESSING,GAUZE,XEROFORM,CURAD,1"X8",ST: Brand: CURAD

## (undated) DEVICE — BLADDER EVACUATOR: Brand: UROVAC BLADDER EVACUATOR

## (undated) DEVICE — REAMER SHAFT, MOD.TRINKLE: Brand: BIXCUT

## (undated) DEVICE — SOLUTION IV 1000ML LAC RINGERS PH 6.5 INJ USP VIAFLX PLAS

## (undated) DEVICE — 3M™ TEGADERM™ TRANSPARENT FILM DRESSING FRAME STYLE, 1624W, 2-3/8 IN X 2-3/4 IN (6 CM X 7 CM), 100/CT 4CT/CASE: Brand: 3M™ TEGADERM™

## (undated) DEVICE — DRAINBAG,ANTI-REFLUX TOWER,L/F,2000ML,LL: Brand: MEDLINE

## (undated) DEVICE — PADDING CAST 6 IN SYNTH

## (undated) DEVICE — SYRINGE 60ML IRRIG PISTON TOMEY

## (undated) DEVICE — SOLUTION IRRIG 2000ML STRL H2O UROMATIC PLAS CONT USP

## (undated) DEVICE — GUIDE WIRE, BALL-TIPPED, STERILE

## (undated) DEVICE — GLOVE,SURG,SENSICARE,ALOE,LF,PF,7: Brand: MEDLINE

## (undated) DEVICE — GAUZE,SPONGE,4"X4",8PLY,STRL,LF,10/TRAY: Brand: MEDLINE

## (undated) DEVICE — TOWEL,OR,DSP,ST,BLUE,STD,8/PK,10PK/CS: Brand: MEDLINE

## (undated) DEVICE — KNIFE SURG 15 DEG STBL BLADE

## (undated) DEVICE — K-WIRE
Type: IMPLANTABLE DEVICE | Site: HIP | Status: NON-FUNCTIONAL
Brand: GAMMA
Removed: 2023-04-20

## (undated) DEVICE — DRESSING FOAM W4XL4IN SIL FACE BORD ADH PD SUP ABSRB COR

## (undated) DEVICE — STAPLER SKIN H3.9MM WIRE DIA0.58MM CRWN 6.9MM 35 STPL FIX

## (undated) DEVICE — GLOVE ORANGE PI 7   MSG9070

## (undated) DEVICE — Device: Brand: MALYUGIN RING SYSTEM 7.0MM

## (undated) DEVICE — 3M™ TEGADERM™ TRANSPARENT FILM DRESSING FRAME STYLE WITH BORDER, 1616, 4 IN X 4-3/4 IN (10 CM X 12 CM), 50/CT 4CT/CASE: Brand: 3M™ TEGADERM™

## (undated) DEVICE — GLOVE ORTHO 8   MSG9480

## (undated) DEVICE — ELECTRODE PT RET AD L9FT HI MOIST COND ADH HYDRGEL CORDED

## (undated) DEVICE — CYSTO: Brand: MEDLINE INDUSTRIES, INC.

## (undated) DEVICE — MICROSURGICAL INSTRUMENT ANTERIOR CHAMBER CANNULA 30GA: Brand: ALCON

## (undated) DEVICE — ELECTRODE ES WIDE FRONTLOADING SURF LOOP SUPERSECT

## (undated) DEVICE — SYRINGE TB 1ML NDL 27GA L0.5IN PLAS SLIP TIP CONVENTIONAL

## (undated) DEVICE — Z INACTIVE USE 2855096 SPONGE GZ W4XL4IN 8 PLY 100% COT

## (undated) DEVICE — BAG DRNGE COMB PK

## (undated) DEVICE — BANDAGE COBAN 6 IN WND 6INX5YD FOAM

## (undated) DEVICE — PACK PROCEDURE SURG HIP PIN TROCHANTERIC FEM NAIL CDS

## (undated) DEVICE — CATHETER F BLLN 30CC 24FR 3 W SPEC HEMA LNG OPN COUDE TIP

## (undated) DEVICE — SUTURE VCRL SZ 0 L36IN ABSRB UD CT-1 L36MM 1/2 CIR TAPR PNT VCP946H

## (undated) DEVICE — LOCKING DRILL